# Patient Record
Sex: MALE | Race: WHITE | NOT HISPANIC OR LATINO | Employment: OTHER | URBAN - METROPOLITAN AREA
[De-identification: names, ages, dates, MRNs, and addresses within clinical notes are randomized per-mention and may not be internally consistent; named-entity substitution may affect disease eponyms.]

---

## 2017-01-01 ENCOUNTER — PRE VISIT (OUTPATIENT)
Dept: CARDIOLOGY | Facility: CLINIC | Age: 62
End: 2017-01-01

## 2017-01-01 DIAGNOSIS — I50.22 CHRONIC SYSTOLIC HEART FAILURE (H): Primary | ICD-10-CM

## 2017-01-10 ENCOUNTER — ALLIED HEALTH/NURSE VISIT (OUTPATIENT)
Dept: CARDIOLOGY | Facility: CLINIC | Age: 62
End: 2017-01-10
Attending: INTERNAL MEDICINE
Payer: MEDICARE

## 2017-01-10 ENCOUNTER — OFFICE VISIT (OUTPATIENT)
Dept: CARDIOLOGY | Facility: CLINIC | Age: 62
End: 2017-01-10
Attending: NURSE PRACTITIONER
Payer: MEDICARE

## 2017-01-10 VITALS
BODY MASS INDEX: 27.76 KG/M2 | DIASTOLIC BLOOD PRESSURE: 63 MMHG | HEART RATE: 88 BPM | WEIGHT: 176.9 LBS | HEIGHT: 67 IN | OXYGEN SATURATION: 98 % | SYSTOLIC BLOOD PRESSURE: 116 MMHG

## 2017-01-10 DIAGNOSIS — I25.5 ISCHEMIC CARDIOMYOPATHY: Primary | ICD-10-CM

## 2017-01-10 DIAGNOSIS — I50.22 CHRONIC SYSTOLIC CONGESTIVE HEART FAILURE (H): Primary | ICD-10-CM

## 2017-01-10 DIAGNOSIS — I50.22 CHRONIC SYSTOLIC HEART FAILURE (H): ICD-10-CM

## 2017-01-10 LAB
ANION GAP SERPL CALCULATED.3IONS-SCNC: 6 MMOL/L (ref 3–14)
BUN SERPL-MCNC: 6 MG/DL (ref 7–30)
CALCIUM SERPL-MCNC: 8.8 MG/DL (ref 8.5–10.1)
CHLORIDE SERPL-SCNC: 103 MMOL/L (ref 94–109)
CO2 SERPL-SCNC: 30 MMOL/L (ref 20–32)
CREAT SERPL-MCNC: 0.88 MG/DL (ref 0.66–1.25)
GFR SERPL CREATININE-BSD FRML MDRD: 88 ML/MIN/1.7M2
GLUCOSE SERPL-MCNC: 90 MG/DL (ref 70–99)
POTASSIUM SERPL-SCNC: 3.8 MMOL/L (ref 3.4–5.3)
SODIUM SERPL-SCNC: 139 MMOL/L (ref 133–144)

## 2017-01-10 PROCEDURE — 80048 BASIC METABOLIC PNL TOTAL CA: CPT | Performed by: NURSE PRACTITIONER

## 2017-01-10 PROCEDURE — 99212 OFFICE O/P EST SF 10 MIN: CPT | Mod: ZF

## 2017-01-10 PROCEDURE — 93289 INTERROG DEVICE EVAL HEART: CPT | Mod: 26 | Performed by: INTERNAL MEDICINE

## 2017-01-10 PROCEDURE — 36415 COLL VENOUS BLD VENIPUNCTURE: CPT | Performed by: NURSE PRACTITIONER

## 2017-01-10 PROCEDURE — 99214 OFFICE O/P EST MOD 30 MIN: CPT | Mod: 25 | Performed by: NURSE PRACTITIONER

## 2017-01-10 PROCEDURE — 93282 PRGRMG EVAL IMPLANTABLE DFB: CPT | Mod: ZF

## 2017-01-10 ASSESSMENT — PAIN SCALES - GENERAL: PAINLEVEL: NO PAIN (0)

## 2017-01-10 NOTE — PROGRESS NOTES
HPI: 61 yr old male patient presents to CORE clinic for initial visit. Pt is well known to me as he was a previous patient at [facility]. Pt states he feels well. He has had a low BP in the ams recently (90/60) which is not unusual for him in the winter months when he is less active. On the days his BP is low, he will cut his enalapril dose in half.  He denies chest pain, SOB, orthopnea, PND, dizziness, LE edema. He is asking about starting an ARNI, as this was brought up to him by his physician in [place]. We discussed that he would need to come off his ace inhibitor prior to starting. He had read about this drug and is concerned about the side effect of low BP given he struggles with low BP now. Given that he is class I-II we discussed it would be reasonable to hold off on any changes currently. He continues to be active. He counts his steps on his I-phone and is walking 10-15,000 steps per day.     PAST MEDICAL HISTORY:  Past Medical History   Diagnosis Date     Chronic systolic congestive heart failure (H)      Ischemic cardiomyopathy      ICD (implantable cardioverter-defibrillator) in place      HTN (hypertension)      Hyperlipidemia      Peripheral vascular disease (H)      CVA (cerebral vascular accident) (H)      Atrial fibrillation (H)        FAMILY HISTORY:  No family history on file.    SOCIAL HISTORY:  Social History     Social History     Marital Status: Single     Spouse Name: N/A     Number of Children: N/A     Years of Education: N/A     Social History Main Topics     Smoking status: Never Smoker      Smokeless tobacco: Not on file     Alcohol Use: Not on file     Drug Use: Not on file     Sexual Activity: Not on file     Other Topics Concern     Not on file     Social History Narrative       CURRENT MEDICATIONS:  Current Outpatient Prescriptions   Medication Sig Dispense Refill     Clopidogrel Bisulfate (PLAVIX PO) Take 75 mg by mouth daily       SPIRONOLACTONE PO Take 12.5 mg by mouth daily        Rosuvastatin Calcium (CRESTOR PO) Take 40 mg by mouth daily       Magic Mouthwash (FV std formula) lidocaine visc 2% 2.5mL/5mL & maalox/mylanta w/ simeth 2.5mL/5mL & diphenhydrAMINE 5mg/5mL Swish and swallow 10 mLs in mouth every 6 hours as needed for mouth sores       Enalapril Maleate (VASOTEC PO) Take 5 mg by mouth 2 times daily       Furosemide (LASIX PO) Take 20 mg by mouth       Carvedilol (COREG PO) Take 12.5 mg by mouth       Enalapril-Hydrochlorothiazide 5-12.5 MG TABS          ROS:   Constitutional: No fever, chills, or sweats. No weight gain/loss.   ENT: No visual disturbance, ear ache, epistaxis, sore throat.   Allergies/Immunologic: Negative.   Respiratory: No cough, hemoptysis.   Cardiovascular: As per HPI.   GI: No nausea, vomiting, hematemesis, melena, or hematochezia.   : No urinary frequency, dysuria, or hematuria.   Integument: Negative.   Psychiatric: Negative.   Neuro: Negative.   Endocrinology: Negative.   Musculoskeletal: Negative.    EXAM:  There were no vitals taken for this visit.  General: appears comfortable, alert and articulate  Head: normocephalic, atraumatic  Eyes: anicteric sclera, EOMI  Neck: no adenopathy  Orophyarynx: moist mucosa, no lesions, dentition intact  Heart: regular, S1/S2, no murmur, gallop, rub, estimated JVP 8cm  Lungs: clear, no rales or wheezing  Abdomen: soft, non-tender, bowel sounds present, no hepatosplenomegaly  Extremities: no clubbing, cyanosis or edema  Neurological: normal speech and affect, no gross motor deficits    Labs:  CBC RESULTS:  Lab Results   Component Value Date    WBC 7.1 07/21/2016    RBC 5.37 07/21/2016    HGB 15.0 07/21/2016    HCT 46.5 07/21/2016    MCV 87 07/21/2016    MCH 27.9 07/21/2016    MCHC 32.3 07/21/2016    RDW 14.0 07/21/2016     07/21/2016       CMP RESULTS:  Lab Results   Component Value Date     01/10/2017    POTASSIUM 3.8 01/10/2017    CHLORIDE 103 01/10/2017    CO2 30 01/10/2017    ANIONGAP 6 01/10/2017     GLC 90 01/10/2017    BUN 6* 01/10/2017    CR 0.88 01/10/2017    GFRESTIMATED 88 01/10/2017    GFRESTBLACK >90   GFR Calc   01/10/2017    JD 8.8 01/10/2017    BILITOTAL 0.6 07/21/2016    ALBUMIN 3.4 07/21/2016    ALKPHOS 59 07/21/2016    ALT 17 07/21/2016    AST 13 07/21/2016        INR RESULTS:  Lab Results   Component Value Date    INR 1.00 07/21/2016       Lab Results   Component Value Date    MAG 2.3 07/21/2016     No results found for: NTBNPI  Lab Results   Component Value Date    NTBNP 318* 07/21/2016       Assessment and Plan:   1. Chronic systolic heart failure secondary to ICM.    Stage C  NYHA Class II  ACEi/ARB yes  BB yes  Aldosterone antagonist yes  SCD prophylaxis ICD  % BiV pacing: N/A  Fluid status euvolemic  NSAID use: no    Follow-up 3 months. No medication changes made. Pt encouraged to stay active over the winter months. If his BP remains less than 90 systolic he is to call the CORE clinic.     CC  Patient Care Team:  Maykel Barber MD as PCP - General (Internal Medicine)  Jael Roblero RN as Nurse Coordinator (Cardiology)  Byron Maynard MD as MD (Cardiology)  Savana Renae, RN as Nurse Coordinator (Cardiology)  BYRON MAYNARD

## 2017-01-10 NOTE — PATIENT INSTRUCTIONS
"You were seen today in the Cardiovascular Clinic at the [facility].     Cardiology Providers you saw during your visit: Jeanie Hernandez NP       1. No medication changes.        Results for VIJAY MCKEE (MRN [id number]) as of 1/10/2017 17:12   Ref. Range 1/10/2017 15:18   Sodium Latest Ref Range: 133-144 mmol/L 139   Potassium Latest Ref Range: 3.4-5.3 mmol/L 3.8   Chloride Latest Ref Range:  mmol/L 103   Carbon Dioxide Latest Ref Range: 20-32 mmol/L 30   Urea Nitrogen Latest Ref Range: 7-30 mg/dL 6 (L)   Creatinine Latest Ref Range: 0.66-1.25 mg/dL 0.88   GFR Estimate Latest Ref Range: >60 mL/min/1.7m2 88   GFR Estimate If Black Latest Ref Range: >60 mL/min/1.7m2 >90...   Calcium Latest Ref Range: 8.5-10.1 mg/dL 8.8   Anion Gap Latest Ref Range: 3-14 mmol/L 6   Glucose Latest Ref Range: 70-99 mg/dL 90       Please limit your fluid intake to 2 L (64 ounces) daily.  2 Liters a day = 8.5 cups, or 72 ounces.  Please limit your salt intake to 2 grams a day or less.    If you gain 2# in 24 hours or 5# in one week call Savana Renae RN so we can adjust your medications as needed over the phone.    Please feel free to call me with any questions or concerns.      Savana Renae RN BSN CHFN  [facility]  Cardiology Care Coordinator-Heart Failure Clinic    Questions and [phone].   First press #1 for the [place] and then press #3 for \"Medical Questions\" to reach us Cardiology Nurses.     On Call Cardiologist for after hours or on weekends: [phone]   option #4 and ask to speak to the on-call Cardiologist. Inform them you are a CORE/heart failure patient at the [facility].        If you need a medication refill please contact your pharmacy.  Please allow 3 business days for your refill to be completed.  _______________________________________________________  C.O.R.E. CLINIC Cardiomyopathy, Optimization, Rehabilitation, Education   The C.O.R.E. CLINIC is " a heart failure specialty clinic within the [facility] where you will work with specialized nurse practioners dedicated to helping patients with heart failure carefully adjust medications, receive education, and learn who and when to call if symptoms develop. They specialize in helping you better understand your condition, slow the progression of your disease, improve the length and quality of your life, help you detect future heart problems before they become life threatening, and avoid hospitalizations.  As always, thank you for trusting us with your health care needs!

## 2017-01-10 NOTE — Clinical Note
1/10/2017      RE: Matt Hester  [address]  [place] [address]       Dear Colleague,    Thank you for the opportunity to participate in the care of your patient, Matt Hester, at the [facility] HEART [facility]. Please see a copy of my visit note below.    HPI: 61 yr old male patient presents to CORE clinic for initial visit. Pt is well known to me as he was a previous patient at [facility]. Pt states he feels well. He has had a low BP in the ams recently (90/60) which is not unusual for him in the winter months when he is less active. On the days his BP is low, he will cut his enalapril dose in half.  He denies chest pain, SOB, orthopnea, PND, dizziness, LE edema. He is asking about starting an ARNI, as this was brought up to him by his physician in [place]. We discussed that he would need to come off his ace inhibitor prior to starting. He had read about this drug and is concerned about the side effect of low BP given he struggles with low BP now. Given that he is class I-II we discussed it would be reasonable to hold off on any changes currently. He continues to be active. He counts his steps on his I-phone and is walking 10-15,000 steps per day.     PAST MEDICAL HISTORY:  Past Medical History   Diagnosis Date     Chronic systolic congestive heart failure (H)      Ischemic cardiomyopathy      ICD (implantable cardioverter-defibrillator) in place      HTN (hypertension)      Hyperlipidemia      Peripheral vascular disease (H)      CVA (cerebral vascular accident) (H)      Atrial fibrillation (H)        FAMILY HISTORY:  No family history on file.    SOCIAL HISTORY:  Social History     Social History     Marital Status: Single     Spouse Name: N/A     Number of Children: N/A     Years of Education: N/A     Social History Main Topics     Smoking status: Never Smoker      Smokeless tobacco: Not on file     Alcohol Use: Not on file     Drug Use: Not on file      Sexual Activity: Not on file     Other Topics Concern     Not on file     Social History Narrative       CURRENT MEDICATIONS:  Current Outpatient Prescriptions   Medication Sig Dispense Refill     Clopidogrel Bisulfate (PLAVIX PO) Take 75 mg by mouth daily       SPIRONOLACTONE PO Take 12.5 mg by mouth daily       Rosuvastatin Calcium (CRESTOR PO) Take 40 mg by mouth daily       Magic Mouthwash (FV std formula) lidocaine visc 2% 2.5mL/5mL & maalox/mylanta w/ simeth 2.5mL/5mL & diphenhydrAMINE 5mg/5mL Swish and swallow 10 mLs in mouth every 6 hours as needed for mouth sores       Enalapril Maleate (VASOTEC PO) Take 5 mg by mouth 2 times daily       Furosemide (LASIX PO) Take 20 mg by mouth       Carvedilol (COREG PO) Take 12.5 mg by mouth       Enalapril-Hydrochlorothiazide 5-12.5 MG TABS          ROS:   Constitutional: No fever, chills, or sweats. No weight gain/loss.   ENT: No visual disturbance, ear ache, epistaxis, sore throat.   Allergies/Immunologic: Negative.   Respiratory: No cough, hemoptysis.   Cardiovascular: As per HPI.   GI: No nausea, vomiting, hematemesis, melena, or hematochezia.   : No urinary frequency, dysuria, or hematuria.   Integument: Negative.   Psychiatric: Negative.   Neuro: Negative.   Endocrinology: Negative.   Musculoskeletal: Negative.    EXAM:  There were no vitals taken for this visit.  General: appears comfortable, alert and articulate  Head: normocephalic, atraumatic  Eyes: anicteric sclera, EOMI  Neck: no adenopathy  Orophyarynx: moist mucosa, no lesions, dentition intact  Heart: regular, S1/S2, no murmur, gallop, rub, estimated JVP 8cm  Lungs: clear, no rales or wheezing  Abdomen: soft, non-tender, bowel sounds present, no hepatosplenomegaly  Extremities: no clubbing, cyanosis or edema  Neurological: normal speech and affect, no gross motor deficits    Labs:  CBC RESULTS:  Lab Results   Component Value Date    WBC 7.1 07/21/2016    RBC 5.37 07/21/2016    HGB 15.0 07/21/2016     HCT 46.5 07/21/2016    MCV 87 07/21/2016    MCH 27.9 07/21/2016    MCHC 32.3 07/21/2016    RDW 14.0 07/21/2016     07/21/2016       CMP RESULTS:  Lab Results   Component Value Date     01/10/2017    POTASSIUM 3.8 01/10/2017    CHLORIDE 103 01/10/2017    CO2 30 01/10/2017    ANIONGAP 6 01/10/2017    GLC 90 01/10/2017    BUN 6* 01/10/2017    CR 0.88 01/10/2017    GFRESTIMATED 88 01/10/2017    GFRESTBLACK >90   GFR Calc   01/10/2017    JD 8.8 01/10/2017    BILITOTAL 0.6 07/21/2016    ALBUMIN 3.4 07/21/2016    ALKPHOS 59 07/21/2016    ALT 17 07/21/2016    AST 13 07/21/2016        INR RESULTS:  Lab Results   Component Value Date    INR 1.00 07/21/2016       Lab Results   Component Value Date    MAG 2.3 07/21/2016     No results found for: NTBNPI  Lab Results   Component Value Date    NTBNP 318* 07/21/2016       Assessment and Plan:   1. Chronic systolic heart failure secondary to ICM.    Stage C  NYHA Class II  ACEi/ARB yes  BB yes  Aldosterone antagonist yes  SCD prophylaxis ICD  % BiV pacing: N/A  Fluid status euvolemic  NSAID use: no    Follow-up 3 months. No medication changes made. Pt encouraged to stay active over the winter months. If his BP remains less than 90 systolic he is to call the CORE clinic.       Sincerely,     Jeanie Hernandez, APRN CNP      CC  Patient Care Team:  Maykel Barber MD as PCP - General (Internal Medicine)  Jael Roblero, RN as Nurse Coordinator (Cardiology)  Kimberly Maynard MD as MD (Cardiology)  Savana Renae, RN as Nurse Coordinator (Cardiology)

## 2017-01-10 NOTE — MR AVS SNAPSHOT
"              After Visit Summary   1/10/2017    Matt Mckee    MRN: [id number]           Patient Information     Date Of Birth          1955        Visit Information        Provider Department      1/10/2017 4:30 PM Jeanie Hernandez APRN [facility]        Care Instructions    You were seen today in the Cardiovascular Clinic at the [facility].     Cardiology Providers you saw during your visit: Jeanie Hernandez NP       1. No medication changes.        Results for MATT MCKEE (MRN [id number]) as of 1/10/2017 17:12   Ref. Range 1/10/2017 15:18   Sodium Latest Ref Range: 133-144 mmol/L 139   Potassium Latest Ref Range: 3.4-5.3 mmol/L 3.8   Chloride Latest Ref Range:  mmol/L 103   Carbon Dioxide Latest Ref Range: 20-32 mmol/L 30   Urea Nitrogen Latest Ref Range: 7-30 mg/dL 6 (L)   Creatinine Latest Ref Range: 0.66-1.25 mg/dL 0.88   GFR Estimate Latest Ref Range: >60 mL/min/1.7m2 88   GFR Estimate If Black Latest Ref Range: >60 mL/min/1.7m2 >90...   Calcium Latest Ref Range: 8.5-10.1 mg/dL 8.8   Anion Gap Latest Ref Range: 3-14 mmol/L 6   Glucose Latest Ref Range: 70-99 mg/dL 90       Please limit your fluid intake to 2 L (64 ounces) daily.  2 Liters a day = 8.5 cups, or 72 ounces.  Please limit your salt intake to 2 grams a day or less.    If you gain 2# in 24 hours or 5# in one week call Savana Renae RN so we can adjust your medications as needed over the phone.    Please feel free to call me with any questions or concerns.      Savana Renae RN BSN CHFN  [facility]  Cardiology Care Coordinator-Heart Failure Clinic    Questions and [phone].   First press #1 for the [place] Information and then press #3 for \"Medical Questions\" to reach us Cardiology Nurses.     On Call Cardiologist for after hours or on weekends: [phone]   option #4 and ask to speak to the on-call Cardiologist. Inform them you are a CORE/heart failure patient at " the [facility].        If you need a medication refill please contact your pharmacy.  Please allow 3 business days for your refill to be completed.  _______________________________________________________  C.O.R.E. CLINIC Cardiomyopathy, Optimization, Rehabilitation, Education   The C.O.R.E. CLINIC is a heart failure specialty clinic within the [facility] where you will work with specialized nurse practioners dedicated to helping patients with heart failure carefully adjust medications, receive education, and learn who and when to call if symptoms develop. They specialize in helping you better understand your condition, slow the progression of your disease, improve the length and quality of your life, help you detect future heart problems before they become life threatening, and avoid hospitalizations.  As always, thank you for trusting us with your health care needs!             Follow-ups after your visit        Follow-up notes from your care team     Return in about 3 months (around 4/10/2017).      Who to contact     If you have questions or need follow up information about today's clinic visit or your schedule please contact [facility] directly at [phone].  Normal or non-critical lab and imaging results will be communicated to you by MyChart, letter or phone within 4 business days after the clinic has received the results. If you do not hear from us within 7 days, please contact the clinic through [employer] or phone. If you have a critical or abnormal lab result, we will notify you by phone as soon as possible.  Submit refill requests through [employer] or call your pharmacy and they will forward the refill request to us. Please allow 3 business days for your refill to be completed.          Additional Information About Your Visit        [employer] Information     [employer] gives you secure access to your electronic health record. If you see a primary care provider, you  "can also send messages to your care team and make appointments. If you have questions, please call your primary care clinic.  If you do not have a primary care provider, please call [phone] and they will assist you.        Care EveryWhere ID     This is your Care EveryWhere ID. This could be used by other organizations to access your [place] medical records  [id number]        Your Vitals Were     Pulse Height BMI (Body Mass Index) Pulse Oximetry          88 1.702 m (5' 7\") 27.70 kg/m2 98%         Blood Pressure from Last 3 Encounters:   01/10/17 116/63   07/21/16 119/82   05/24/16 118/83    Weight from Last 3 Encounters:   01/10/17 80.241 kg (176 lb 14.4 oz)   07/21/16 75.297 kg (166 lb)              Today, you had the following     No orders found for display       Primary Care Provider Office Phone # Fax #    Maykel Barber [phone]       [place] [address] [place] [phone]        Thank you!     Thank you for choosing [facility]  for your care. Our goal is always to provide you with excellent care. Hearing back from our patients is one way we can continue to improve our services. Please take a few minutes to complete the written survey that you may receive in the mail after your visit with us. Thank you!             Your Updated Medication List - Protect others around you: Learn how to safely use, store and throw away your medicines at www.disposemymeds.org.          This list is accurate as of: 1/10/17  5:15 PM.  Always use your most recent med list.                   Brand Name Dispense Instructions for use    COREG PO      Take 12.5 mg by mouth       CRESTOR PO      Take 40 mg by mouth daily       LASIX PO      Take 20 mg by mouth       lidocaine visc 2% 2.5mL/5mL & maalox/mylanta w/ simeth 2.5mL/5mL & diphenhydrAMINE 5mg/5mL Susp suspension    MAGIC Mouthwash     Swish and swallow 10 mLs in mouth every 6 hours as needed for mouth sores       PLAVIX PO      " Take 75 mg by mouth daily       SPIRONOLACTONE PO      Take 12.5 mg by mouth daily       VASOTEC PO      Take 5 mg by mouth 2 times daily

## 2017-01-10 NOTE — NURSING NOTE
Chief Complaint   Patient presents with     New Patient     New CORE appt; 61 yr old male with h/o chronic systolic HF presenting to establish care with labs and device check prior

## 2017-01-10 NOTE — PATIENT INSTRUCTIONS
It was a pleasure seeing you in clinic today.  Please do not hesitate to call with any questions or concerns.  You are scheduled for a remote transmission on 3.  We look forward to seeing you in clinic at your next device check in 6 months.      Aziza Barreto RN  Electrophysiology Nurse Clinician  [facility]  Heart [facility]    During Business Hours Please Call:  [phone]  After Hours Please Call:  [phone]- select option #4 and ask for job code [id number]

## 2017-01-10 NOTE — PROGRESS NOTES
Patient seen in clinic for evaluation and iterative programming of a [employer] Single lead ICD per MD orders. Patient has an appointment to see NEEMA Sargent CNP today. Normal ICD function.  0 episodes recorded. Intrinsic rhythm = SR @ 70 bpm.  = 0%. Estimated battery longevity = 7 years to CHARISSA. RV lead impedance trends appear stable. Patient reports that he is feeling well and denies complaints. Plan for patient to send a remote transmission in 3 months and return to clinic in 6 months.

## 2017-01-11 NOTE — NURSING NOTE
Diet: Patient instructed regarding a heart failure healthy diet, including discussion of reduced fat and 2000 mg daily sodium restriction, daily weights, medication purpose and compliance, fluid restrictions and resources for patient and family to access for assistance with heart failure management.       Labs: Patient was given results of the laboratory testing obtained today and patient was instructed about when to return for the next laboratory testing.    Med Reconcile: Reviewed and verified all current medications with the patient. The updated medication list was printed and given to the patient.    Return Appointment: Patient given instructions regarding scheduling next clinic visit.     Patient stated he understood all health information given and agreed to call with further questions or concerns.

## 2017-04-18 ENCOUNTER — PRE VISIT (OUTPATIENT)
Dept: CARDIOLOGY | Facility: CLINIC | Age: 62
End: 2017-04-18

## 2017-04-18 DIAGNOSIS — I50.22 CHRONIC SYSTOLIC HEART FAILURE (H): Primary | ICD-10-CM

## 2017-04-25 ENCOUNTER — ALLIED HEALTH/NURSE VISIT (OUTPATIENT)
Dept: CARDIOLOGY | Facility: CLINIC | Age: 62
End: 2017-04-25
Attending: NURSE PRACTITIONER
Payer: MEDICARE

## 2017-04-25 VITALS
SYSTOLIC BLOOD PRESSURE: 123 MMHG | HEART RATE: 86 BPM | DIASTOLIC BLOOD PRESSURE: 84 MMHG | WEIGHT: 169 LBS | OXYGEN SATURATION: 95 % | BODY MASS INDEX: 26.47 KG/M2

## 2017-04-25 DIAGNOSIS — I25.5 ISCHEMIC CARDIOMYOPATHY: Primary | ICD-10-CM

## 2017-04-25 DIAGNOSIS — I50.22 CHRONIC SYSTOLIC HEART FAILURE (H): ICD-10-CM

## 2017-04-25 LAB
ANION GAP SERPL CALCULATED.3IONS-SCNC: 4 MMOL/L (ref 3–14)
BUN SERPL-MCNC: 9 MG/DL (ref 7–30)
CALCIUM SERPL-MCNC: 8.7 MG/DL (ref 8.5–10.1)
CHLORIDE SERPL-SCNC: 106 MMOL/L (ref 94–109)
CO2 SERPL-SCNC: 30 MMOL/L (ref 20–32)
CREAT SERPL-MCNC: 0.88 MG/DL (ref 0.66–1.25)
GFR SERPL CREATININE-BSD FRML MDRD: 88 ML/MIN/1.7M2
GLUCOSE SERPL-MCNC: 83 MG/DL (ref 70–99)
POTASSIUM SERPL-SCNC: 3.4 MMOL/L (ref 3.4–5.3)
SODIUM SERPL-SCNC: 140 MMOL/L (ref 133–144)

## 2017-04-25 PROCEDURE — 36415 COLL VENOUS BLD VENIPUNCTURE: CPT | Performed by: NURSE PRACTITIONER

## 2017-04-25 PROCEDURE — 93282 PRGRMG EVAL IMPLANTABLE DFB: CPT | Mod: ZF

## 2017-04-25 PROCEDURE — 99212 OFFICE O/P EST SF 10 MIN: CPT | Mod: ZF

## 2017-04-25 PROCEDURE — 93282 PRGRMG EVAL IMPLANTABLE DFB: CPT | Mod: 26 | Performed by: INTERNAL MEDICINE

## 2017-04-25 PROCEDURE — 80048 BASIC METABOLIC PNL TOTAL CA: CPT | Performed by: NURSE PRACTITIONER

## 2017-04-25 PROCEDURE — 99214 OFFICE O/P EST MOD 30 MIN: CPT | Mod: 25 | Performed by: NURSE PRACTITIONER

## 2017-04-25 RX ORDER — ENALAPRIL MALEATE 5 MG/1
5 TABLET ORAL 2 TIMES DAILY
Qty: 30 TABLET | Refills: 1 | Status: SHIPPED | OUTPATIENT
Start: 2017-04-25 | End: 2021-11-17

## 2017-04-25 ASSESSMENT — PAIN SCALES - GENERAL: PAINLEVEL: NO PAIN (0)

## 2017-04-25 NOTE — MR AVS SNAPSHOT
"              After Visit Summary   2017    Matt Mckee    MRN: [id number]           Patient Information     Date Of Birth          1955        Visit Information        Provider Department      2017 6:30 PM Jeanie Hernandez APRN [facility]        Today's Diagnoses     Ischemic cardiomyopathy    -  1      Care Instructions    You were seen today in the Cardiovascular Clinic at the [facility].     Cardiology Providers you saw during your visit: Jeanie Hernandez NP       1. No medication changes for today.    Results for MATT MCKEE (MRN [id number]) as of 2017 17:35   Ref. Range 2017 15:53   Sodium Latest Ref Range: 133 - 144 mmol/L 140   Potassium Latest Ref Range: 3.4 - 5.3 mmol/L 3.4   Chloride Latest Ref Range: 94 - 109 mmol/L 106   Carbon Dioxide Latest Ref Range: 20 - 32 mmol/L 30   Urea Nitrogen Latest Ref Range: 7 - 30 mg/dL 9   Creatinine Latest Ref Range: 0.66 - 1.25 mg/dL 0.88   GFR Estimate Latest Ref Range: >60 mL/min/1.7m2 88   GFR Estimate If Black Latest Ref Range: >60 mL/min/1.7m2 >90...   Calcium Latest Ref Range: 8.5 - 10.1 mg/dL 8.7   Anion Gap Latest Ref Range: 3 - 14 mmol/L 4   Glucose Latest Ref Range: 70 - 99 mg/dL 83       Please limit your fluid intake to 2 L (64 ounces) daily.  2 Liters a day = 8.5 cups, or 72 ounces.  Please limit your salt intake to 2 grams a day or less.    If you gain 2# in 24 hours or 5# in one week call Savana Renae RN so we can adjust your medications as needed over the phone.    Please feel free to call me with any questions or concerns.      Savana Renae RN BSN CHFN  [facility]  Cardiology Care Coordinator-Heart Failure Clinic    Questions and [phone].   First press #1 for the University and then press #3 for \"Medical Questions\" to reach us Cardiology Nurses.     On Call Cardiologist for after hours or on weekends: [phone]   option #4 and ask to speak to " the on-call Cardiologist. Inform them you are a CORE/heart failure patient at the [facility].        If you need a medication refill please contact your pharmacy.  Please allow 3 business days for your refill to be completed.  _______________________________________________________  C.O.R.E. CLINIC Cardiomyopathy, Optimization, Rehabilitation, Education   The C.O.R.E. CLINIC is a heart failure specialty clinic within the [facility] where you will work with specialized nurse practitioners dedicated to helping patients with heart failure carefully adjust medications, receive education, and learn who and when to call if symptoms develop. They specialize in helping you better understand your condition, slow the progression of your disease, improve the length and quality of your life, help you detect future heart problems before they become life threatening, and avoid hospitalizations.  As always, thank you for trusting us with your health care needs!             Follow-ups after your visit        Your next 10 appointments already scheduled     Jul 20, 2017  8:00 AM CDT   (Arrive by 7:45 AM)   RETURN HEART FAILURE with Kimberly Maynard MD   [facility] ([facility])    [address] [place] [id number] [phone]            Oct 03, 2017  3:00 PM CDT   Lab with UC LAB   [facility] ([facility])    [address] [place] [id number] [phone]            Oct 03, 2017  3:30 PM CDT   (Arrive by 3:15 PM)   CORE RETURN with NEEMA Turk CNP   [facility] ([facility])    [address] [place] [id number] [phone]              Who to contact     If you have questions or need follow up information about today's clinic visit or your schedule please contact [facility] directly at  [phone].  Normal or non-critical lab and imaging results will be communicated to you by Pixelatedhart, letter or phone within 4 business days after the clinic has received the results. If you do not hear from us within 7 days, please contact the clinic through Resale Therapyt or phone. If you have a critical or abnormal lab result, we will notify you by phone as soon as possible.  Submit refill requests through CultureIQ or call your pharmacy and they will forward the refill request to us. Please allow 3 business days for your refill to be completed.          Additional Information About Your Visit        Pixelatedhart Information     CultureIQ gives you secure access to your electronic health record. If you see a primary care provider, you can also send messages to your care team and make appointments. If you have questions, please call your primary care clinic.  If you do not have a primary care provider, please call [phone] and they will assist you.        Care EveryWhere ID     This is your Care EveryWhere ID. This could be used by other organizations to access your [place] medical records  [id number]        Your Vitals Were     Pulse Pulse Oximetry BMI (Body Mass Index)             86 95% 26.47 kg/m2          Blood Pressure from Last 3 Encounters:   04/25/17 123/84   01/10/17 116/63   07/21/16 119/82    Weight from Last 3 Encounters:   04/25/17 76.7 kg (169 lb)   01/10/17 80.2 kg (176 lb 14.4 oz)   07/21/16 75.3 kg (166 lb)              Today, you had the following     No orders found for display         Today's Medication Changes          These changes are accurate as of: 4/25/17  8:07 PM.  If you have any questions, ask your nurse or doctor.               These medicines have changed or have updated prescriptions.        Dose/Directions    enalapril 5 MG tablet   Commonly known as:  VASOTEC   This may have changed:  medication strength   Used for:  Ischemic cardiomyopathy   Changed by:  Jeanie Hernandez APRN CNP         Dose:  5 mg   Take 1 tablet (5 mg) by mouth 2 times daily   Quantity:  30 tablet   Refills:  1            Where to get your medicines      These medications were sent to [facility] [address] [facility] - [place], [place] - [address]  [address] [facility] [address]     Phone:  [phone]     enalapril 5 MG tablet                Primary Care Provider Office Phone # Fax #    Maykel Barber [phone]       [facility] [address] [facility] [address]        Thank you!     Thank you for choosing [facility]  for your care. Our goal is always to provide you with excellent care. Hearing back from our patients is one way we can continue to improve our services. Please take a few minutes to complete the written survey that you may receive in the mail after your visit with us. Thank you!             Your Updated Medication List - Protect others around you: Learn how to safely use, store and throw away your medicines at www.disposemymeds.org.          This list is accurate as of: 4/25/17  8:07 PM.  Always use your most recent med list.                   Brand Name Dispense Instructions for use    COREG PO      Take 12.5 mg by mouth       CRESTOR PO      Take 40 mg by mouth daily       enalapril 5 MG tablet    VASOTEC    30 tablet    Take 1 tablet (5 mg) by mouth 2 times daily       LASIX PO      Take 20 mg by mouth       lidocaine visc 2% 2.5mL/5mL & maalox/mylanta w/ simeth 2.5mL/5mL & diphenhydrAMINE 5mg/5mL Susp suspension    [facility] Mouthwash [facility]     Swish and swallow 10 mLs in mouth every 6 hours as needed for mouth sores       PLAVIX PO      Take 75 mg by mouth daily       SPIRONOLACTONE PO      Take 12.5 mg by mouth daily

## 2017-04-25 NOTE — MR AVS SNAPSHOT
After Visit Summary   4/25/2017    Matt Hester    MRN: [id number]           Patient Information     Date Of Birth          1955        Visit Information        Provider Department      4/25/2017 5:30 PM 1, Uc Cv Device [facility]        Today's Diagnoses     Ischemic cardiomyopathy    -  1       Follow-ups after your visit        Follow-up notes from your care team     Return in about 4 months (around 8/25/2017) for [place], ICD check.      Your next 10 appointments already scheduled     Apr 25, 2017  5:30 PM CDT   (Arrive by 5:15 PM)   Implanted Defibulator with Uc Cv Device 1   [facility]    [address] [place] [id number] [phone]            Apr 25, 2017  6:00 PM CDT   Lab with  LAB   [facility])    [address] [place] [id number] [phone]            Apr 25, 2017  6:30 PM CDT   (Arrive by 6:15 PM)   CORE RETURN with NEEMA Turk CNP   [facility]    [address] [place] [id number] [phone]              Who to contact     If you have questions or need follow up information about today's clinic visit or your schedule please contact [facility] directly at [phone].  Normal or non-critical lab and imaging results will be communicated to you by MyChart, letter or phone within 4 business days after the clinic has received the results. If you do not hear from us within 7 days, please contact the clinic through MyChart or phone. If you have a critical or abnormal lab result, we will notify you by phone as soon as possible.  Submit refill requests through Graffle or call your pharmacy and they will forward the refill request to us. Please allow 3 business days for your refill to be completed.          Additional Information About Your  Visit        Kiliman[employer]o Energy[employer] Information     CISSOID gives you secure access to your electronic health record. If you see a primary care provider, you can also send messages to your care team and make appointments. If you have questions, please call your primary care clinic.  If you do not have a primary care provider, please call [phone] and they will assist you.        Care EveryWhere ID     This is your Care EveryWhere ID. This could be used by other organizations to access your [place] medical records  [id number]         Blood Pressure from Last 3 Encounters:   01/10/17 116/63   07/21/16 119/82   05/24/16 118/83    Weight from Last 3 Encounters:   01/10/17 80.2 kg (176 lb 14.4 oz)   07/21/16 75.3 kg (166 lb)              We Performed the Following     ICD DEVICE PROGRAMMING EVAL, SINGLE LEAD ICD        Primary Care Provider Office Phone # Fax #    Maykel Barber [phone]       [place] [address] [address] [place] [phone]        Thank you!     Thank you for choosing [facility]  for your care. Our goal is always to provide you with excellent care. Hearing back from our patients is one way we can continue to improve our services. Please take a few minutes to complete the written survey that you may receive in the mail after your visit with us. Thank you!             Your Updated Medication List - Protect others around you: Learn how to safely use, store and throw away your medicines at www.disposemymeds.org.          This list is accurate as of: 4/25/17  4:18 PM.  Always use your most recent med list.                   Brand Name Dispense Instructions for use    COREG PO      Take 12.5 mg by mouth       CRESTOR PO      Take 40 mg by mouth daily       LASIX PO      Take 20 mg by mouth       lidocaine visc 2% 2.5mL/5mL & maalox/mylanta w/ simeth 2.5mL/5mL & diphenhydrAMINE 5mg/5mL Susp suspension    [facility]     Swish and swallow 10 mLs in mouth every 6 hours as  needed for mouth sores       PLAVIX PO      Take 75 mg by mouth daily       SPIRONOLACTONE PO      Take 12.5 mg by mouth daily       VASOTEC PO      Take 5 mg by mouth 2 times daily

## 2017-04-25 NOTE — NURSING NOTE
Chief Complaint   Patient presents with     Follow Up For     Return CORE appt; 62 yr old male with h/o chronic systolic HF presenting for follow up with labs prior     BRIANA Panda

## 2017-04-25 NOTE — PATIENT INSTRUCTIONS
"[facility]     Cardiology Providers you saw during your visit: Jeanie Hernandez NP       1. No medication changes for today.    Results for VIJAY MCKEE (MRN [id number]) as of 2017 17:35   Ref. Range 2017 15:53   Sodium Latest Ref Range: 133 - 144 mmol/L 140   Potassium Latest Ref Range: 3.4 - 5.3 mmol/L 3.4   Chloride Latest Ref Range: 94 - 109 mmol/L 106   Carbon Dioxide Latest Ref Range: 20 - 32 mmol/L 30   Urea Nitrogen Latest Ref Range: 7 - 30 mg/dL 9   Creatinine Latest Ref Range: 0.66 - 1.25 mg/dL 0.88   GFR Estimate Latest Ref Range: >60 mL/min/1.7m2 88   GFR Estimate If Black Latest Ref Range: >60 mL/min/1.7m2 >90...   Calcium Latest Ref Range: 8.5 - 10.1 mg/dL 8.7   Anion Gap Latest Ref Range: 3 - 14 mmol/L 4   Glucose Latest Ref Range: 70 - 99 mg/dL 83       Please limit your fluid intake to 2 L (64 ounces) daily.  2 Liters a day = 8.5 cups, or 72 ounces.  Please limit your salt intake to 2 grams a day or less.    If you gain 2# in 24 hours or 5# in one week call Savana Renae RN so we can adjust your medications as needed over the phone.    Please feel free to call me with any questions or concerns.      Savana Renae RN BSN CHFN  [facility]  Cardiology Care Coordinator-Heart Failure Clinic    Questions and [phone].   First press #1 for the [employer] and then press #3 for \"Medical Questions\" to reach us Cardiology Nurses.     On Call Cardiologist for after hours or on weekends: [phone]   option #4 and ask to speak to the on-call Cardiologist. Inform them you are a CORE/heart failure patient at the [facility].        If you need a medication refill please contact your pharmacy.  Please allow 3 business days for your refill to be completed.  _______________________________________________________  C.O.R.E. CLINIC Cardiomyopathy, Optimization, Rehabilitation, Education   The " [facility]. [facility] is a heart failure specialty clinic within the [facility] where you will work with specialized nurse practitioners dedicated to helping patients with heart failure carefully adjust medications, receive education, and learn who and when to call if symptoms develop. They specialize in helping you better understand your condition, slow the progression of your disease, improve the length and quality of your life, help you detect future heart problems before they become life threatening, and avoid hospitalizations.  As always, thank you for trusting us with your health care needs!

## 2017-04-25 NOTE — PROGRESS NOTES
HPI: 62 yr old male pt returns to CORE clinic for follow up of ischemic cardiomyopathy. Pt states he feels well. Over the winter he is less active, and as a result he has a lower BP. He feels lightheaded and has to cut back on his meds due to such. He states this happens every winter. He otherwise denies SOB, orthopnea, PND, chest pain, LE edema. His energy level is good, he is working 60+ hours/week.  He is not using lasix.     PAST MEDICAL HISTORY:  Past Medical History:   Diagnosis Date     Atrial fibrillation (H)      Chronic systolic congestive heart failure (H)      CVA (cerebral vascular accident) (H)      HTN (hypertension)      Hyperlipidemia      ICD (implantable cardioverter-defibrillator) in place      Ischemic cardiomyopathy      Peripheral vascular disease (H)        FAMILY HISTORY:  No family history on file.    SOCIAL HISTORY:  Social History     Social History     Marital status: Single     Spouse name: N/A     Number of children: N/A     Years of education: N/A     Social History Main Topics     Smoking status: Never Smoker     Smokeless tobacco: None     Alcohol use None     Drug use: None     Sexual activity: Not Asked     Other Topics Concern     None     Social History Narrative       CURRENT MEDICATIONS:  Current Outpatient Prescriptions   Medication Sig Dispense Refill     enalapril (VASOTEC) 5 MG tablet Take 1 tablet (5 mg) by mouth 2 times daily 30 tablet 1     Clopidogrel Bisulfate (PLAVIX PO) Take 75 mg by mouth daily       SPIRONOLACTONE PO Take 12.5 mg by mouth daily       Rosuvastatin Calcium (CRESTOR PO) Take 40 mg by mouth daily       Magic Mouthwash (FV std formula) lidocaine visc 2% 2.5mL/5mL & maalox/mylanta w/ simeth 2.5mL/5mL & diphenhydrAMINE 5mg/5mL Swish and swallow 10 mLs in mouth every 6 hours as needed for mouth sores       Furosemide (LASIX PO) Take 20 mg by mouth       Carvedilol (COREG PO) Take 12.5 mg by mouth       [DISCONTINUED] Enalapril Maleate (VASOTEC PO) Take 5  mg by mouth 2 times daily         ROS:   Constitutional: No fever, chills, or sweats. No weight gain/loss.   ENT: No visual disturbance, ear ache, epistaxis, sore throat.   Allergies/Immunologic: Negative.   Respiratory: No cough, hemoptysis.   Cardiovascular: As per HPI.   GI: No nausea, vomiting, hematemesis, melena, or hematochezia.   : No urinary frequency, dysuria, or hematuria.   Integument: Negative.   Psychiatric: Negative.   Neuro: Negative.   Endocrinology: Negative.   Musculoskeletal: Negative.    EXAM:  /84 (BP Location: Left arm, Patient Position: Chair, Cuff Size: Adult Regular)  Pulse 86  Wt 76.7 kg (169 lb)  SpO2 95%  BMI 26.47 kg/m2  General: appears comfortable, alert and articulate  Head: normocephalic, atraumatic  Eyes: anicteric sclera, EOMI  Neck: no adenopathy  Orophyarynx: moist mucosa, no lesions, dentition intact  Heart: regular, S1/S2, no murmur, gallop, rub, estimated JVP 8cm  Lungs: clear, no rales or wheezing  Abdomen: soft, non-tender, bowel sounds present, no hepatosplenomegaly  Extremities: no clubbing, cyanosis or edema  Neurological: normal speech and affect, no gross motor deficits    Labs:  CBC RESULTS:  Lab Results   Component Value Date    WBC 7.1 07/21/2016    RBC 5.37 07/21/2016    HGB 15.0 07/21/2016    HCT 46.5 07/21/2016    MCV 87 07/21/2016    MCH 27.9 07/21/2016    MCHC 32.3 07/21/2016    RDW 14.0 07/21/2016     07/21/2016       CMP RESULTS:  Lab Results   Component Value Date     04/25/2017    POTASSIUM 3.4 04/25/2017    CHLORIDE 106 04/25/2017    CO2 30 04/25/2017    ANIONGAP 4 04/25/2017    GLC 83 04/25/2017    BUN 9 04/25/2017    CR 0.88 04/25/2017    GFRESTIMATED 88 04/25/2017    GFRESTBLACK >90   GFR Calc   04/25/2017    JD 8.7 04/25/2017    BILITOTAL 0.6 07/21/2016    ALBUMIN 3.4 07/21/2016    ALKPHOS 59 07/21/2016    ALT 17 07/21/2016    AST 13 07/21/2016        INR RESULTS:  Lab Results   Component Value Date    INR 1.00  07/21/2016       Lab Results   Component Value Date    MAG 2.3 07/21/2016     No results found for: NTBNPI  Lab Results   Component Value Date    NTBNP 318 (H) 07/21/2016       Assessment and Plan:   1. Chronic systolic heart failure secondary to ICM.    Stage C  NYHA Class II  ACEi/ARB yes  BB yes  Aldosterone antagonist yes  SCD prophylaxis ICD  % BiV pacing: N/A  Fluid status euvolemic  NSAID use: no    2. ASCAD: no anginal sx: on BB/ASA/Statin    3. Hx of CVA - on Plavix - intolerant of Aggrenox    4. Tachycardia: discussed use of ivabradine. Pt would like to think about this prior to starting. He has an upcoming apt in July with Dr. Maynard- October with [facility]    Follow-up July with Dr. Maynard    CC  Patient Care Team:  Maykel Barber MD as PCP - General (Internal Medicine)  Jael Roblero RN as Nurse Coordinator (Cardiology)  Byron Maynard MD as MD (Cardiology)  Savana Renae, RN as Nurse Coordinator (Cardiology)  Jeanie Hernandez APRN CNP as Nurse Practitioner (Nurse Practitioner - Gerontology)  BYRON MAYNARD

## 2017-04-25 NOTE — LETTER
4/25/2017      RE: Matt Hester  [address]  [facility] [address]       Dear Colleague,    Thank you for the opportunity to participate in the care of your patient, Matt Hester, at the [facility] HE[facility]. Please see a copy of my visit note below.    HPI: 62 yr old male pt returns to [facility] clinic for follow up of ischemic cardiomyopathy. Pt states he feels well. Over the winter he is less active, and as a result he has a lower BP. He feels lightheaded and has to cut back on his meds due to such. He states this happens every winter. He otherwise denies SOB, orthopnea, PND, chest pain, LE edema. His energy level is good, he is working 60+ hours/week.  He is not using lasix.     PAST MEDICAL HISTORY:  Past Medical History:   Diagnosis Date     Atrial fibrillation (H)      Chronic systolic congestive heart failure (H)      CVA (cerebral vascular accident) (H)      HTN (hypertension)      Hyperlipidemia      ICD (implantable cardioverter-defibrillator) in place      Ischemic cardiomyopathy      Peripheral vascular disease (H)        FAMILY HISTORY:  No family history on file.    SOCIAL HISTORY:  Social History     Social History     Marital status: Single     Spouse name: N/A     Number of children: N/A     Years of education: N/A     Social History Main Topics     Smoking status: Never Smoker     Smokeless tobacco: None     Alcohol use None     Drug use: None     Sexual activity: Not Asked     Other Topics Concern     None     Social History Narrative       CURRENT MEDICATIONS:  Current Outpatient Prescriptions   Medication Sig Dispense Refill     enalapril (VASOTEC) 5 MG tablet Take 1 tablet (5 mg) by mouth 2 times daily 30 tablet 1     Clopidogrel Bisulfate (PLAVIX PO) Take 75 mg by mouth daily       SPIRONOLACTONE PO Take 12.5 mg by mouth daily       Rosuvastatin Calcium (CRESTOR PO) Take 40 mg by mouth daily       Magic Mouthwash (FV std formula)  lidocaine visc 2% 2.5mL/5mL & maalox/mylanta w/ simeth 2.5mL/5mL & diphenhydrAMINE 5mg/5mL Swish and swallow 10 mLs in mouth every 6 hours as needed for mouth sores       Furosemide (LASIX PO) Take 20 mg by mouth       Carvedilol (COREG PO) Take 12.5 mg by mouth       [DISCONTINUED] Enalapril Maleate (VASOTEC PO) Take 5 mg by mouth 2 times daily         ROS:   Constitutional: No fever, chills, or sweats. No weight gain/loss.   ENT: No visual disturbance, ear ache, epistaxis, sore throat.   Allergies/Immunologic: Negative.   Respiratory: No cough, hemoptysis.   Cardiovascular: As per HPI.   GI: No nausea, vomiting, hematemesis, melena, or hematochezia.   : No urinary frequency, dysuria, or hematuria.   Integument: Negative.   Psychiatric: Negative.   Neuro: Negative.   Endocrinology: Negative.   Musculoskeletal: Negative.    EXAM:  /84 (BP Location: Left arm, Patient Position: Chair, Cuff Size: Adult Regular)  Pulse 86  Wt 76.7 kg (169 lb)  SpO2 95%  BMI 26.47 kg/m2  General: appears comfortable, alert and articulate  Head: normocephalic, atraumatic  Eyes: anicteric sclera, EOMI  Neck: no adenopathy  Orophyarynx: moist mucosa, no lesions, dentition intact  Heart: regular, S1/S2, no murmur, gallop, rub, estimated JVP 8cm  Lungs: clear, no rales or wheezing  Abdomen: soft, non-tender, bowel sounds present, no hepatosplenomegaly  Extremities: no clubbing, cyanosis or edema  Neurological: normal speech and affect, no gross motor deficits    Labs:  CBC RESULTS:  Lab Results   Component Value Date    WBC 7.1 07/21/2016    RBC 5.37 07/21/2016    HGB 15.0 07/21/2016    HCT 46.5 07/21/2016    MCV 87 07/21/2016    MCH 27.9 07/21/2016    MCHC 32.3 07/21/2016    RDW 14.0 07/21/2016     07/21/2016       CMP RESULTS:  Lab Results   Component Value Date     04/25/2017    POTASSIUM 3.4 04/25/2017    CHLORIDE 106 04/25/2017    CO2 30 04/25/2017    ANIONGAP 4 04/25/2017    GLC 83 04/25/2017    BUN 9 04/25/2017     CR 0.88 04/25/2017    GFRESTIMATED 88 04/25/2017    GFRESTBLACK >90   GFR Calc   04/25/2017    JD 8.7 04/25/2017    BILITOTAL 0.6 07/21/2016    ALBUMIN 3.4 07/21/2016    ALKPHOS 59 07/21/2016    ALT 17 07/21/2016    AST 13 07/21/2016        INR RESULTS:  Lab Results   Component Value Date    INR 1.00 07/21/2016       Lab Results   Component Value Date    MAG 2.3 07/21/2016     No results found for: NTBNPI  Lab Results   Component Value Date    NTBNP 318 (H) 07/21/2016       Assessment and Plan:   1. Chronic systolic heart failure secondary to ICM.    Stage C  NYHA Class II  ACEi/ARB yes  BB yes  Aldosterone antagonist yes  SCD prophylaxis ICD  % BiV pacing: N/A  Fluid status euvolemic  NSAID use: no    2. ASCAD: no anginal sx: on BB/ASA/Statin    3. Hx of CVA - on Plavix - intolerant of Aggrenox    4. Tachycardia: discussed use of ivabradine. Pt would like to think about this prior to starting. He has an upcoming apt in July with Dr. Maynard- October with [facility]    Follow-up July with Dr. Maynard    CC  Patient Care Team:  Maykel Barber MD as PCP - General (Internal Medicine)  Jael Roblero, RN as Nurse Coordinator (Cardiology)  Byron Maynard MD as MD (Cardiology)  Savana Renae, RN as Nurse Coordinator (Cardiology)  Jeanie Hernandez APRN CNP as Nurse Practitioner (Nurse Practitioner - Gerontology)  BYRON MAYNARD      Please do not hesitate to contact me if you have any questions/concerns.     Sincerely,     NEEMA Turk CNP

## 2017-05-16 ENCOUNTER — TRANSFERRED RECORDS (OUTPATIENT)
Dept: HEALTH INFORMATION MANAGEMENT | Facility: CLINIC | Age: 62
End: 2017-05-16

## 2017-07-21 ENCOUNTER — PRE VISIT (OUTPATIENT)
Dept: CARDIOLOGY | Facility: CLINIC | Age: 62
End: 2017-07-21

## 2017-07-25 ENCOUNTER — ALLIED HEALTH/NURSE VISIT (OUTPATIENT)
Dept: CARDIOLOGY | Facility: CLINIC | Age: 62
End: 2017-07-25
Attending: INTERNAL MEDICINE
Payer: MEDICARE

## 2017-07-25 VITALS
HEART RATE: 77 BPM | SYSTOLIC BLOOD PRESSURE: 124 MMHG | OXYGEN SATURATION: 95 % | HEIGHT: 67 IN | WEIGHT: 171.6 LBS | BODY MASS INDEX: 26.93 KG/M2 | DIASTOLIC BLOOD PRESSURE: 80 MMHG

## 2017-07-25 DIAGNOSIS — I50.22 CHRONIC SYSTOLIC HEART FAILURE (H): Primary | ICD-10-CM

## 2017-07-25 DIAGNOSIS — I25.5 ISCHEMIC CARDIOMYOPATHY: Primary | ICD-10-CM

## 2017-07-25 PROCEDURE — 99214 OFFICE O/P EST MOD 30 MIN: CPT | Mod: 25 | Performed by: INTERNAL MEDICINE

## 2017-07-25 PROCEDURE — 93282 PRGRMG EVAL IMPLANTABLE DFB: CPT | Mod: ZF

## 2017-07-25 PROCEDURE — 99212 OFFICE O/P EST SF 10 MIN: CPT

## 2017-07-25 PROCEDURE — 93289 INTERROG DEVICE EVAL HEART: CPT | Mod: 26 | Performed by: INTERNAL MEDICINE

## 2017-07-25 ASSESSMENT — PAIN SCALES - GENERAL: PAINLEVEL: NO PAIN (0)

## 2017-07-25 NOTE — MR AVS SNAPSHOT
After Visit Summary   7/25/2017    Matt Hester    MRN: [id number]           Patient Information     Date Of Birth          1955        Visit Information        Provider Department      7/25/2017 9:30 AM Kimberly Maynard MD [facility]        Today's Diagnoses     Chronic systolic heart failure (H)    -  1       Follow-ups after your visit        Your next 10 appointments already scheduled     Oct 03, 2017  2:30 PM CDT   Lab with  LAB   [facility] ([facility])    [address] [place] [id number]   [phone]            Oct 03, 2017  3:00 PM CDT   (Arrive by 2:45 PM)   Implanted Defibulator with  Cv Device 1   [facility] ([facility])    [address] [place] [id number] [phone]            Oct 03, 2017  3:30 PM CDT   (Arrive by 3:15 PM)   [facility] RETURN with NEEMA Turk CNP   [facility] ([facility])    [address] [place] [id number] [phone]              Who to contact     If you have questions or need follow up information about today's clinic visit or your schedule please contact [facility] directly at [phone].  Normal or non-critical lab and imaging results will be communicated to you by MyChart, letter or phone within 4 business days after the clinic has received the results. If you do not hear from us within 7 days, please contact the clinic through MyChart or phone. If you have a critical or abnormal lab result, we will notify you by phone as soon as possible.  Submit refill requests through [employer] or call your pharmacy and they will forward the refill request to us. Please allow 3 business days for your refill to be completed.          Additional Information About Your Visit        [employer] Information     [employer] gives you secure access to your electronic health  "record. If you see a primary care provider, you can also send messages to your care team and make appointments. If you have questions, please call your primary care clinic.  If you do not have a primary care provider, please call [phone] and they will assist you.        Care EveryWhere ID     This is your Care EveryWhere ID. This could be used by other organizations to access your [place] medical records  [id number]        Your Vitals Were     Pulse Height Pulse Oximetry BMI (Body Mass Index)          77 1.702 m (5' 7\") 95% 26.88 kg/m2         Blood Pressure from Last 3 Encounters:   07/25/17 124/80   04/25/17 123/84   01/10/17 116/63    Weight from Last 3 Encounters:   07/25/17 77.8 kg (171 lb 9.6 oz)   04/25/17 76.7 kg (169 lb)   01/10/17 80.2 kg (176 lb 14.4 oz)              Today, you had the following     No orders found for display       Primary Care Provider Office Phone # Fax #    Maykel Barber [phone]       [facility] [address] [facility] [phone]        Equal Access to Services     MARILYN ALICEA AH: Hadii aad martha hadasho Sogueraali, waaxda luqadaha, qaybta kaalmada adeegyada, jeanie barragan. So [facility] [phone].    ATENCIÓN: Si habla español, tiene a castaneda disposición servicios gratuitos de asistencia lingüística. Ll[facility] [phone].    We comply with applicable federal civil rights laws and [place] laws. We do not discriminate on the basis of race, color, national origin, age, disability sex, sexual orientation or gender identity.            Thank you!     Thank you for choosing [facility]  for your care. Our goal is always to provide you with excellent care. Hearing back from our patients is one way we can continue to improve our services. Please take a few minutes to complete the written survey that you may receive in the mail after your visit with us. Thank you!             Your Updated Medication List - Protect others around " you: Learn how to safely use, store and throw away your medicines at www.disposemymeds.org.          This list is accurate as of: 7/25/17 12:58 PM.  Always use your most recent med list.                   Brand Name Dispense Instructions for use Diagnosis    COREG PO      Take 12.5 mg by mouth        CRESTOR PO      Take 40 mg by mouth daily        enalapril 5 MG tablet    VASOTEC    30 tablet    Take 1 tablet (5 mg) by mouth 2 times daily    Ischemic cardiomyopathy       LASIX PO      Take 20 mg by mouth        lidocaine visc 2% 2.5mL/5mL & maalox/mylanta w/ simeth 2.5mL/5mL & diphenhydrAMINE 5mg/5mL Susp suspension    [facility] Mouthwash [facility]     Swish and swallow 10 mLs in mouth every 6 hours as needed for mouth sores        PLAVIX PO      Take 75 mg by mouth daily        SPIRONOLACTONE PO      Take 12.5 mg by mouth daily

## 2017-07-25 NOTE — NURSING NOTE
Chief Complaint   Patient presents with     Follow Up For     one yr. follow up for ICM/ device ck

## 2017-07-25 NOTE — PATIENT INSTRUCTIONS
It was a pleasure to see you in clinic today.  Please do not hesitate to call us if you have any questions or concerns.  Please return to clinic in 3 mos for a ICD check.      Kerrie De Dios R.N.  Electrophysiology nurse specialist  [facility]  [address] [facility] [address]     Jing Brody  [phone] business hours    After business hours please call [phone], option #4, and ask for Job code [phone].

## 2017-07-25 NOTE — PROGRESS NOTES
Pt seen in clinic for evaluation and iterative programming of his [employer] single chamber ICD per MD order.  He looks well and he states that he is feeling well and he continues to work construction daily.  His ICD check does not show any episodes of ventricular arrhythmias, he RV paces 0% of the time, his heart rate histograms show good heart rate variation and his ICD battery estimates 6.5 years left.  We will plan to see him back in clinic in 3 mos when he comes to see Jeanie Hernandez NP.  Normal ICD function.    Single ICD

## 2017-07-25 NOTE — PROGRESS NOTES
"  2016      RE: Matt Hester   MRN: [id number]   : 1955      Dear Colleagues:        I had the pleasure of seeing Matt Hester in the [facility] on 2016.  As you know, he is a very pleasant, 61-year-old man with a long-standing history of ischemic cardiomyopathy, and I will detail his cardiac history below.  He has been followed in [place] and was previously seeing Jun Velazquez as well as one of our nurse practitioners, who has now moved here, Jeanie Hernandez.  When he was first diagnosed, his ejection fraction was in the 10%-15% range.  He has now been stable in the 25% range with \"an excellent quality of life\" for many, many years.  He denies any heart failure hospitalizations in the last several years.  He has not had any recurrent anginal symptoms.  He denies syncope.  He has never had a shock from his primary prevention ICD.  He denies lower extremity edema.  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  He also denies PND or orthopnea.      His cardiac history is summarized as follows:  His first MI was in .  In , he had an anterior wall MI requiring stenting.  He had a single-chamber ICD implanted in 2004 for primary prevention and a generator change on 2010 and again in 2011.  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     PAST MEDICAL HISTORY:  Past Medical History   Diagnosis Date     Chronic systolic congestive heart failure (H)      Ischemic cardiomyopathy      ICD (implantable cardioverter-defibrillator) in place      HTN (hypertension)      Hyperlipidemia      Peripheral vascular disease (H)      CVA (cerebral vascular accident) (H)      Atrial fibrillation (H), " paroxysmal         Other medical history includes   - colonic polyp in 11/2010   - atrophic gastritis  -  Haro esophagus   - benign neoplasm of the colon in 2011   - chronic abdominal pain  -  diverticulosis, bile duct hematoma per liver biopsy,  - colectomy 01/05/2011  - a stroke in 10/2011   - right occipital residual vision changes  - Hypertension   - nasal polyp   - ICD placement in 06/2011 for primary prevention.      ALLERGIES:     1.  Aggrenox, severe nausea.   2.  Alcohol, hives.   3.  Effient, rash and itching.   4.  Demerol, itching, nausea and vomiting.   5.  Flagyl.   6.  Levaquin.     SOCIAL HISTORY:  The patient works full-time in the construction business.  He does not drink, smoke or use drugs, and he never has.  [place]  He has 1 son and a 6-year-old grandson that he is very close with.       CURRENT MEDICATIONS:  Current Outpatient Prescriptions   Medication Sig Dispense Refill     enalapril (VASOTEC) 5 MG tablet Take 1 tablet (5 mg) by mouth 2 times daily 30 tablet 1     Clopidogrel Bisulfate (PLAVIX PO) Take 75 mg by mouth daily       SPIRONOLACTONE PO Take 12.5 mg by mouth daily       Rosuvastatin Calcium (CRESTOR PO) Take 40 mg by mouth daily       Magic Mouthwash (FV std formula) lidocaine visc 2% 2.5mL/5mL & maalox/mylanta w/ simeth 2.5mL/5mL & diphenhydrAMINE 5mg/5mL Swish and swallow 10 mLs in mouth every 6 hours as needed for mouth sores       Furosemide (LASIX PO) Take 20 mg by mouth       Carvedilol (COREG PO) Take 12.5 mg by mouth         ROS:   Constitutional: No fever, chills, or sweats. No weight gain/loss.   ENT: No visual disturbance, ear ache, epistaxis, sore throat.   Allergies/Immunologic: Negative.   Respiratory: No cough, hemoptysis.   Cardiovascular: As per HPI.   GI: No nausea, vomiting, hematemesis, melena, or hematochezia.  : No urinary frequency, dysuria, or hematuria.   Integument: Negative.   Psychiatric: Negative.   Neuro: Negative.  "  Endocrinology: Negative.   Musculoskeletal: Negative.    EXAM:  /80 (BP Location: Left arm, Patient Position: Chair, Cuff Size: Adult Regular)  Pulse 77  Ht 1.702 m (5' 7\")  Wt 77.8 kg (171 lb 9.6 oz)  SpO2 95%  BMI 26.88 kg/m2  General: appears comfortable, alert and articulate  Head: normocephalic, atraumatic  Eyes: anicteric sclera, EOMI  Neck: no adenopathy  Orophyarynx: moist mucosa, no lesions, dentition intact  Heart: regular, S1/S2, no murmur, gallop, rub, estimated JVP  <10   Lungs: clear, no rales or wheezing  Abdomen: soft, non-tender, bowel sounds present, no hepatosplenomegaly  Extremities: no clubbing, cyanosis or edema  Neurological: normal speech and affect, no gross motor deficits    Labs:  CBC RESULTS:  Lab Results   Component Value Date    WBC 7.1 07/21/2016    RBC 5.37 07/21/2016    HGB 15.0 07/21/2016    HCT 46.5 07/21/2016    MCV 87 07/21/2016    MCH 27.9 07/21/2016    MCHC 32.3 07/21/2016    RDW 14.0 07/21/2016     07/21/2016       CMP RESULTS:  Lab Results   Component Value Date     04/25/2017    POTASSIUM 3.4 04/25/2017    CHLORIDE 106 04/25/2017    CO2 30 04/25/2017    ANIONGAP 4 04/25/2017    GLC 83 04/25/2017    BUN 9 04/25/2017    CR 0.88 04/25/2017    GFRESTIMATED 88 04/25/2017    GFRESTBLACK >90   GFR Calc   04/25/2017    JD 8.7 04/25/2017    BILITOTAL 0.6 07/21/2016    ALBUMIN 3.4 07/21/2016    ALKPHOS 59 07/21/2016    ALT 17 07/21/2016    AST 13 07/21/2016        INR RESULTS:  Lab Results   Component Value Date    INR 1.00 07/21/2016       Lab Results   Component Value Date    MAG 2.3 07/21/2016     No results found for: NTBNPI  Lab Results   Component Value Date    NTBNP 318 (H) 07/21/2016     Angiogram 2011  CONCLUSIONS:   1. Widely patent stents in the ramus intermedius and LAD.   2. Two-vessel coronary artery disease.  3. Moderate left ventricular chamber enlargement.   4. Left ventricular systolic function is severely reduced with a "   visually estimated ejection fraction of 15 to 20% and global   hypokinesis.   5. Anterior apical and inferior akinesis.   6. Possible left ventricular thrombus.     EVENTS, OUTCOME, INFORMATION, AND PLAN:   1. The vascular sheath was secured in place with plans for   removal on the [address] [place] [address] unit.   2. The patient was transferred to [address] [place] [address] in stable condition   without chest pain or ECG changes.     PLAN:   1. ASAP sheath removal.   2. Aspirin 325 mg p.o. daily.   3. Plavix 75 mg p.o. daily indefinitely    Right heart catheterization from 01/24/2014 shows an RA of 8, RV 30/8, PA 29/16 with a wedge of 11, cardiac output 3.95 L/min, body surface area of 1.87 giving a normal cardiac index, PA saturation of 76%, normal pulmonary vascular resistance.    Echocardiogram from 05/14/2016 shows LV size is normal.  Qualitative ejection fraction is 25%-30%.  There is no mural thrombus.  Apical akinesis.  Severe global hypokinesis of the left ventricle.  Pacemaker noted within the right ventricle.  No atrial septal defect.  I do not have LV dimensions or any valves reported on with this report.  Single-chamber ICD function today shows 1 episode of 9 beats of nonsustained VT.  No tachy therapies.      Last stress test with a Lexiscan 12/30/2013:  Small territories of periinfarct reversibility and 2 large territories of fixed defects, severely reduced EF of 25%.  CT angio of the neck:  Mild atherosclerosis, no significant narrowing or dilation of intracranial circulation.  This was performed on 05/14/2016.     Assessment and Plan:   In summary, this is a very pleasant, 61-year-old man with a long-standing history of ischemic cardiomyopathy with an ejection fraction in the 25%-30% range who has been stable on medical therapy for many years with an ICD for primary prevention.  His N-terminal proBNP is very low today.  His neck veins are not elevated on exam, and he is New York Heart Association class I and is stage C.      Overall,  I think he is on an excellent medical regimen, and I am not changing any of his medications today.  We talked a lot today about his overall prognosis, which I think is excellent at this point despite his low ejection fraction given his absence of hospitalizations, normal end-organ function, normal cardiac output on his last right heart catheterization and his stability over many years.  He is not yet meeting criteria for Entresto, but this remains an option should his natriuretic peptides be elevated the next time that I see him or should his breathing worsen.  We also talked about the possibility of a Koppel trial down the line should his symptoms worsen before he would ever need a transplant or a left ventricular assist device.  He is far too well for any of these therapies presently.      With regard to his coronary artery disease, I recommend that we keep his LDL low.  We will repeat this with his next labs.  He is on aspirin and Plavix, which by last angiogram, they had wanted to keep him on this for life.  In addition, off of Plavix, he had had a stroke, and so we want to keep it on for that indication as well.  He is also on a statin and a beta blocker and is asymptomatic.      With regard to prevention of sudden cardiac death, his defibrillator is working properly.  He does not meet criteria for CRT.  He has 7 years left on the battery.      history of stroke, he has had an echocardiogram since this last possible TIA, which did not show an LV thrombus.  He had a repeat Ziopatch, which did not show any atrial fibrillation; therefore, I do not think he needs anticoagulation.      Please feel free to contact me if you have any further questions.  We will have him see Jeanie Hernandez in 6 months and me in 1 year and sooner as needed.     1. Chronic systolic heart failure secondary to coronary artery disease   Stage C  NYHA Class II  ACEi/ARB yes  BB yes  Aldosterone antagonist yes  SCD prophylaxis ICD  % BiV  pacing: N/A  Fluid status euvolemic  NSAID use: none     Follow-up 6 months with [facility], 1 year with me and sooner PRN. We will perform an echo next year.     The patient was seen and examined with cardiology fellow Zach Torres.     Kimberly Maynard MD   of Medicine   [facility] Division of Cardiology         CC  Patient Care Team:  Maykel Barber MD as PCP - General (Internal Medicine)  Jael Roblero RN as Nurse Coordinator (Cardiology)  Kimberly Maynard MD as MD (Cardiology)  Savana Renae, RN as Nurse Coordinator (Cardiology)  Edel Hernandez, APRN CNP as Nurse Practitioner (Nurse Practitioner - Gerontology)  EDEL HERNANDEZ

## 2017-07-25 NOTE — MR AVS SNAPSHOT
After Visit Summary   7/25/2017    Matt Hester    MRN: [id number]           Patient Information     Date Of Birth          1955        Visit Information        Provider Department      7/25/2017 9:00 AM 1, Uc Cv Device [facility]        Today's Diagnoses     Ischemic cardiomyopathy    -  1      Care Instructions    It was a pleasure to see you in clinic today.  Please do not hesitate to call us if you have any questions or concerns.  Please return to clinic in 3 mos for a ICD check.      Kerrie De Dios R.N.  Electrophysiology nurse specialist  [facility]  [address] [place] [id number]     Jingalok Brody  [phone] business hours    After business hours please call [phone], option #[phone], and ask for Job code [id number].                  Follow-ups after your visit        Follow-up notes from your care team     Discussed this visit Return in about 3 months (around 10/25/2017) for ICD check, Jeanie Hernandez NP.      Your next 10 appointments already scheduled     Jul 25, 2017  9:30 AM CDT   (Arrive by 9:15 AM)   RETURN HEART FAILURE with Kimberly Maynard MD   [facility])    [address] [place] [id number] [phone]            Oct 03, 2017  2:30 PM CDT   Lab with UC LAB   [facility])    [address] [place] [id number] [phone]            Oct 03, 2017  3:00 PM CDT   (Arrive by 2:45 PM)   Implanted Defibulator with Uc Cv Device 1   [facility])    [address] [place] [id number] [phone]            Oct 03, 2017  3:30 PM CDT   (Arrive by 3:15 PM)   CORE RETURN with NEEMA [facility])    [address] [place] [id number]    [phone]              Who to contact     If you have questions or need follow up information about today's clinic visit or your schedule please contact [facility] directly at [phone].  Normal or non-critical lab and imaging results will be communicated to you by MyChart, letter or phone within 4 business days after the clinic has received the results. If you do not hear from us within 7 days, please contact the clinic through MyChart or phone. If you have a critical or abnormal lab result, we will notify you by phone as soon as possible.  Submit refill requests through [employer] or call your pharmacy and they will forward the refill request to us. Please allow 3 business days for your refill to be completed.          Additional Information About Your Visit        [email or web address] Information     [employer] gives you secure access to your electronic health record. If you see a primary care provider, you can also send messages to your care team and make appointments. If you have questions, please call your primary care clinic.  If you do not have a primary care provider, please call [phone] and they will assist you.        Care EveryWhere ID     This is your Care EveryWhere ID. This could be used by other organizations to access your [facility] medical records  [id number]         Blood Pressure from Last 3 Encounters:   04/25/17 123/84   01/10/17 116/63   07/21/16 119/82    Weight from Last 3 Encounters:   04/25/17 76.7 kg (169 lb)   01/10/17 80.2 kg (176 lb 14.4 oz)   07/21/16 75.3 kg (166 lb)              We Performed the Following     ICD DEVICE PROGRAMMING EVAL, SINGLE LEAD ICD        Primary Care Provider Office Phone # Fax #    Maykel Barber [phone]       [place] [address] [place] [phone]        Equal Access to Services     CHAYO ALICEA : Uri Moss, renita tovar, qamaria luzta tony chavarria, jeanie barragan. So  [facility] [phone].    ATENCIÓN: Si sally wellington, tiene a castaneda disposición servicios gratuitos de asistencia lingüística. Gabrielle armendariz [phone].    We comply with applicable federal civil rights laws and [place] laws. We do not discriminate on the basis of race, color, national origin, age, disability sex, sexual orientation or gender identity.            Thank you!     Thank you for choosing [facility]  for your care. Our goal is always to provide you with excellent care. Hearing back from our patients is one way we can continue to improve our services. Please take a few minutes to complete the written survey that you may receive in the mail after your visit with us. Thank you!             Your Updated Medication List - Protect others around you: Learn how to safely use, store and throw away your medicines at www.disposemymeds.org.          This list is accurate as of: 7/25/17  9:24 AM.  Always use your most recent med list.                   Brand Name Dispense Instructions for use Diagnosis    COREG PO      Take 12.5 mg by mouth        CRESTOR PO      Take 40 mg by mouth daily        enalapril 5 MG tablet    VASOTEC    30 tablet    Take 1 tablet (5 mg) by mouth 2 times daily    Ischemic cardiomyopathy       LASIX PO      Take 20 mg by mouth        lidocaine visc 2% 2.5mL/5mL & maalox/mylanta w/ simeth 2.5mL/5mL & diphenhydrAMINE 5mg/5mL Susp suspension    MAG Mouthwash HOSPITAL     Swish and swallow 10 mLs in mouth every 6 hours as needed for mouth sores        PLAVIX PO      Take 75 mg by mouth daily        SPIRONOLACTONE PO      Take 12.5 mg by mouth daily

## 2017-07-25 NOTE — PROGRESS NOTES
"  17    RE: Matt Hester   MRN: [id number]   : 1955      Dear Colleagues:        I had the pleasure of seeing Matt Hester in the [facility] on 2017.  As you know, he is a very pleasant, 61-year-old man with a long-standing history of ischemic cardiomyopathy, and I will detail his cardiac history below.  He has been followed in [place] and was previously seeing Jun Velazquez as well as one of our nurse practitioners, who has now moved here, Jeanie Hernandez.  When he was first diagnosed, his ejection fraction was in the 10%-15% range.  He has now been stable in the 25% range with \"an excellent quality of life\" for many, many years.      His cardiac history is summarized as follows:  His first MI was in .  In , he had an anterior wall MI requiring stenting.  He had a single-chamber ICD implanted in 2004 for primary prevention and a generator change on 2010 and again in 2011.  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  He has regained function of his left arm but his visual deficit persists.    Today, he tells me that he is doing well.  He feels about the same as a year ago.  He works a lot and does heavy lifting without chest pain, shortness of breath or palpitations.  He does not if he does heavy lifting he can get a little lightheaded but this is rare and unchanged for the past year.  He still does everything he likes.  Weight is up a few pounds but denies edema, PND or orthopnea.  No GI bleeding or other evidence of blood loss.  He uses PRN lasix but has taken only 2 tablets in the course of the year without a change in his symptoms.  He takes his coreg to total 50 mg a day but divides it to avoid lowering his BP too much.  During the winter he does have to cut this back a bit.    PAST MEDICAL HISTORY:  Past Medical " History   Diagnosis Date     Chronic systolic congestive heart failure (H)      Ischemic cardiomyopathy      ICD (implantable cardioverter-defibrillator) in place      HTN (hypertension)      Hyperlipidemia      Peripheral vascular disease (H)      CVA (cerebral vascular accident) (H)      Atrial fibrillation (H), paroxysmal           Other medical history includes   - colonic polyp in 11/2010   - atrophic gastritis  -  Haro esophagus   - benign neoplasm of the colon in 2011   - chronic abdominal pain  -  diverticulosis, bile duct hematoma per liver biopsy,  - colectomy 01/05/2011  - a stroke in 10/2011   - right occipital residual vision changes  - Hypertension   - nasal polyp   - ICD placement in 06/2011 for primary prevention.      ALLERGIES:     1.  Aggrenox, severe nausea.   2.  Alcohol, hives.   3.  Effient, rash and itching.   4.  Demerol, itching, nausea and vomiting.   5.  Flagyl.   6.  Levaquin.     SOCIAL HISTORY:  The patient works full-time in the construction business.  He does not drink, smoke or use drugs, and he never has.  [place]  He has 1 son and a 6-year-old grandson that he is very close with.       CURRENT MEDICATIONS:  Current Outpatient Prescriptions   Medication Sig Dispense Refill     enalapril (VASOTEC) 5 MG tablet Take 1 tablet (5 mg) by mouth 2 times daily 30 tablet 1     Clopidogrel Bisulfate (PLAVIX PO) Take 75 mg by mouth daily       SPIRONOLACTONE PO Take 12.5 mg by mouth daily       Rosuvastatin Calcium (CRESTOR PO) Take 40 mg by mouth daily       Magic Mouthwash (FV std formula) lidocaine visc 2% 2.5mL/5mL & maalox/mylanta w/ simeth 2.5mL/5mL & diphenhydrAMINE 5mg/5mL Swish and swallow 10 mLs in mouth every 6 hours as needed for mouth sores       Furosemide (LASIX PO) Take 20 mg by mouth       Carvedilol (COREG PO) Take 12.5 mg by mouth         ROS:   Constitutional: No fever, chills, or sweats. No weight gain/loss.   ENT: No visual disturbance,  "ear ache, epistaxis, sore throat.   Allergies/Immunologic: Negative.   Respiratory: No cough, hemoptysis.   Cardiovascular: As per HPI.   GI: No nausea, vomiting, hematemesis, melena, or hematochezia.  : No urinary frequency, dysuria, or hematuria.   Integument: Negative.   Psychiatric: Negative.   Neuro: Negative.   Endocrinology: Negative.   Musculoskeletal: Negative.    EXAM:  /80 (BP Location: Left arm, Patient Position: Chair, Cuff Size: Adult Regular)  Pulse 77  Ht 1.702 m (5' 7\")  Wt 77.8 kg (171 lb 9.6 oz)  SpO2 95%  BMI 26.88 kg/m2  General: appears comfortable, alert and articulate  Head: normocephalic, atraumatic  Eyes: anicteric sclera, EOMI  Neck: no adenopathy  Orophyarynx: moist mucosa, no lesions, dentition intact  Heart: regular, S1/S2, no murmur, gallop, rub, estimated JVP  <10   Lungs: clear, no rales or wheezing  Abdomen: soft, non-tender, bowel sounds present, no hepatosplenomegaly  Extremities: no clubbing, cyanosis or edema  Neurological: normal speech and affect, no gross motor deficits    Labs:  CBC RESULTS:  Lab Results   Component Value Date    WBC 7.1 07/21/2016    RBC 5.37 07/21/2016    HGB 15.0 07/21/2016    HCT 46.5 07/21/2016    MCV 87 07/21/2016    MCH 27.9 07/21/2016    MCHC 32.3 07/21/2016    RDW 14.0 07/21/2016     07/21/2016       CMP RESULTS:  Lab Results   Component Value Date     04/25/2017    POTASSIUM 3.4 04/25/2017    CHLORIDE 106 04/25/2017    CO2 30 04/25/2017    ANIONGAP 4 04/25/2017    GLC 83 04/25/2017    BUN 9 04/25/2017    CR 0.88 04/25/2017    GFRESTIMATED 88 04/25/2017    GFRESTBLACK >90   GFR Calc   04/25/2017    JD 8.7 04/25/2017    BILITOTAL 0.6 07/21/2016    ALBUMIN 3.4 07/21/2016    ALKPHOS 59 07/21/2016    ALT 17 07/21/2016    AST 13 07/21/2016        INR RESULTS:  Lab Results   Component Value Date    INR 1.00 07/21/2016       Lab Results   Component Value Date    MAG 2.3 07/21/2016     No results found for: " NTBN  Lab Results   Component Value Date    NTBNP 318 (H) 07/21/2016     Angiogram 2011  CONCLUSIONS:   1. Widely patent stents in the ramus intermedius and LAD.   2. Two-vessel coronary artery disease.  3. Moderate left ventricular chamber enlargement.   4. Left ventricular systolic function is severely reduced with a   visually estimated ejection fraction of 15 to 20% and global   hypokinesis.   5. Anterior apical and inferior akinesis.   6. Possible left ventricular thrombus.     Right heart catheterization from 01/24/2014 shows an RA of 8, RV 30/8, PA 29/16 with a wedge of 11, cardiac output 3.95 L/min, body surface area of 1.87 giving a normal cardiac index, PA saturation of 76%, normal pulmonary vascular resistance.    Echocardiogram from 05/14/2016 shows LV size is normal.  Qualitative ejection fraction is 25%-30%.  There is no mural thrombus.  Apical akinesis.  Severe global hypokinesis of the left ventricle.  Pacemaker noted within the right ventricle.  No atrial septal defect.  I do not have LV dimensions or any valves reported on with this report.  Single-chamber ICD function today shows 1 episode of 9 beats of nonsustained VT.  No tachy therapies.      Last stress test with a Lexiscan 12/30/2013:  Small territories of periinfarct reversibility and 2 large territories of fixed defects, severely reduced EF of 25%.  CT angio of the neck:  Mild atherosclerosis, no significant narrowing or dilation of intracranial circulation.  This was performed on 05/14/2016.     ICD Interrogation:  His ICD check does not show any episodes of ventricular arrhythmias, he RV paces 0% of the time, his heart rate histograms show good heart rate variation and his ICD battery estimates 6.5 years left.     Assessment and Plan:   In summary, this is a very pleasant, 61-year-old man with a long-standing history of ischemic cardiomyopathy with an ejection fraction in the 25%-30% range who has been stable on medical therapy for many  years with an ICD for primary prevention.  Today, he appears euvolemic without evidence of volume overload and has been without any heart failure hospitalizations.  He is NYHA class I, stage C today.  He is on the correct medication regimen including high dose statin, Crestor 40 mg daily, enalapril 5 mg twice daily, a full dose aspirin, coreg, aldactone and Plavix.  His labs were reviewed from April which were unremarkable.  He remains at this time too well for consideration of advanced therapies at this time and we should continue the current medical regimen.     He is on aspirin and Plavix, which by last angiogram, they had wanted to keep him on this for life.  In addition, off of Plavix, he had had a stroke, and so we want to keep it on for that indication as well.  He is also on a statin and a beta blocker and is asymptomatic.      With regard to prevention of sudden cardiac death, his defibrillator is working properly.  He does not meet criteria for CRT.    With regards to his history of stroke, he has had an echocardiogram since this last possible TIA, which did not show an LV thrombus or shunt.  He had a repeat Ziopatch, which did not show any atrial fibrillation; therefore, I do not think he needs anticoagulation. He is on a full dose aspirin and Plavix for his history of coronary disease as detailed above.     Please feel free to contact me if you have any further questions.  We will have him see CORE in 6 months and me in 1 year and sooner as needed.  Plan for a repeat echocardiogram with next visit.    1. Chronic systolic heart failure secondary to coronary artery disease.  Ischemic cardiomyopathy.  Stage C  NYHA Class II  ACEi/ARB yes  BB yes  Aldosterone antagonist yes  SCD prophylaxis ICD  % BiV pacing: N/A  Fluid status euvolemic  NSAID use: none     CC  Patient Care Team:  Maykel Barber MD as PCP - General (Internal Medicine)  Jael Roblero, RN as Nurse Coordinator  (Cardiology)  Kimberly Maynard MD as MD (Cardiology)  Savana Renae, RN as Nurse Coordinator (Cardiology)  Edel Hernandez APRN CNP as Nurse Practitioner (Nurse Practitioner - Gerontology)  EDEL HERNANDEZ MD  Cardiology Fellow

## 2017-07-25 NOTE — LETTER
"2017      RE: Matt Hester  [address]  [facility] [address]       Dear Colleague,    Thank you for the opportunity to participate in the care of your patient, Matt Hester, at the [facility] HEART [facility] at [facility]. Please see a copy of my visit note below.      17    RE: Matt Hester   MRN: [id number]   : 1955      Dear Colleagues:        I had the pleasure of seeing Matt Hester in the [facility] on 2017.  As you know, he is a very pleasant, 61-year-old man with a long-standing history of ischemic cardiomyopathy, and I will detail his cardiac history below.  He has been followed in [place] and was previously seeing Jun Velazquez as well as one of our nurse practitioners, who has now moved here, Jeanie Hernandez.  When he was first diagnosed, his ejection fraction was in the 10%-15% range.  He has now been stable in the 25% range with \"an excellent quality of life\" for many, many years.      His cardiac history is summarized as follows:  His first MI was in .  In , he had an anterior wall MI requiring stenting.  He had a single-chamber ICD implanted in 2004 for primary prevention and a generator change on 2010 and again in 2011.  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  He has regained function of his left arm but his visual deficit persists.    Today, he tells me that he is doing well.  He feels about the same as a year ago.  He works a lot and does heavy lifting without chest pain, shortness of breath or palpitations.  He does not if he does heavy lifting he can get a little lightheaded but this is rare and unchanged for the past year.  He still does everything he likes.  Weight is up a few pounds but denies edema, PND or orthopnea.  No GI bleeding or other " evidence of blood loss.  He uses PRN lasix but has taken only 2 tablets in the course of the year without a change in his symptoms.  He takes his coreg to total 50 mg a day but divides it to avoid lowering his BP too much.  During the winter he does have to cut this back a bit.    PAST MEDICAL HISTORY:  Past Medical History   Diagnosis Date     Chronic systolic congestive heart failure (H)      Ischemic cardiomyopathy      ICD (implantable cardioverter-defibrillator) in place      HTN (hypertension)      Hyperlipidemia      Peripheral vascular disease (H)      CVA (cerebral vascular accident) (H)      Atrial fibrillation (H), paroxysmal           Other medical history includes   - colonic polyp in 11/2010   - atrophic gastritis  -  Haro esophagus   - benign neoplasm of the colon in 2011   - chronic abdominal pain  -  diverticulosis, bile duct hematoma per liver biopsy,  - colectomy 01/05/2011  - a stroke in 10/2011   - right occipital residual vision changes  - Hypertension   - nasal polyp   - ICD placement in 06/2011 for primary prevention.      ALLERGIES:     1.  Aggrenox, severe nausea.   2.  Alcohol, hives.   3.  Effient, rash and itching.   4.  Demerol, itching, nausea and vomiting.   5.  Flagyl.   6.  Levaquin.     SOCIAL HISTORY:  The patient works full-time in the construction business.  He does not drink, smoke or use drugs, and he never has.  [place] of [place].  He has 1 son and a 6-year-old grandson that he is very close with.       CURRENT MEDICATIONS:  Current Outpatient Prescriptions   Medication Sig Dispense Refill     enalapril (VASOTEC) 5 MG tablet Take 1 tablet (5 mg) by mouth 2 times daily 30 tablet 1     Clopidogrel Bisulfate (PLAVIX PO) Take 75 mg by mouth daily       SPIRONOLACTONE PO Take 12.5 mg by mouth daily       Rosuvastatin Calcium (CRESTOR PO) Take 40 mg by mouth daily       Magic Mouthwash (FV std formula) lidocaine visc 2% 2.5mL/5mL & maalox/mylanta w/ simeth  "2.5mL/5mL & diphenhydrAMINE 5mg/5mL Swish and swallow 10 mLs in mouth every 6 hours as needed for mouth sores       Furosemide (LASIX PO) Take 20 mg by mouth       Carvedilol (COREG PO) Take 12.5 mg by mouth         ROS:   Constitutional: No fever, chills, or sweats. No weight gain/loss.   ENT: No visual disturbance, ear ache, epistaxis, sore throat.   Allergies/Immunologic: Negative.   Respiratory: No cough, hemoptysis.   Cardiovascular: As per HPI.   GI: No nausea, vomiting, hematemesis, melena, or hematochezia.  : No urinary frequency, dysuria, or hematuria.   Integument: Negative.   Psychiatric: Negative.   Neuro: Negative.   Endocrinology: Negative.   Musculoskeletal: Negative.    EXAM:  /80 (BP Location: Left arm, Patient Position: Chair, Cuff Size: Adult Regular)  Pulse 77  Ht 1.702 m (5' 7\")  Wt 77.8 kg (171 lb 9.6 oz)  SpO2 95%  BMI 26.88 kg/m2  General: appears comfortable, alert and articulate  Head: normocephalic, atraumatic  Eyes: anicteric sclera, EOMI  Neck: no adenopathy  Orophyarynx: moist mucosa, no lesions, dentition intact  Heart: regular, S1/S2, no murmur, gallop, rub, estimated JVP  <10   Lungs: clear, no rales or wheezing  Abdomen: soft, non-tender, bowel sounds present, no hepatosplenomegaly  Extremities: no clubbing, cyanosis or edema  Neurological: normal speech and affect, no gross motor deficits    Labs:  CBC RESULTS:  Lab Results   Component Value Date    WBC 7.1 07/21/2016    RBC 5.37 07/21/2016    HGB 15.0 07/21/2016    HCT 46.5 07/21/2016    MCV 87 07/21/2016    MCH 27.9 07/21/2016    MCHC 32.3 07/21/2016    RDW 14.0 07/21/2016     07/21/2016       CMP RESULTS:  Lab Results   Component Value Date     04/25/2017    POTASSIUM 3.4 04/25/2017    CHLORIDE 106 04/25/2017    CO2 30 04/25/2017    ANIONGAP 4 04/25/2017    GLC 83 04/25/2017    BUN 9 04/25/2017    CR 0.88 04/25/2017    GFRESTIMATED 88 04/25/2017    GFRESTBLACK >90   GFR Calc   04/25/2017 "    JD 8.7 04/25/2017    BILITOTAL 0.6 07/21/2016    ALBUMIN 3.4 07/21/2016    ALKPHOS 59 07/21/2016    ALT 17 07/21/2016    AST 13 07/21/2016        INR RESULTS:  Lab Results   Component Value Date    INR 1.00 07/21/2016       Lab Results   Component Value Date    MAG 2.3 07/21/2016     No results found for: NTBNPI  Lab Results   Component Value Date    NTBNP 318 (H) 07/21/2016     Angiogram 2011  CONCLUSIONS:   1. Widely patent stents in the ramus intermedius and LAD.   2. Two-vessel coronary artery disease.  3. Moderate left ventricular chamber enlargement.   4. Left ventricular systolic function is severely reduced with a   visually estimated ejection fraction of 15 to 20% and global   hypokinesis.   5. Anterior apical and inferior akinesis.   6. Possible left ventricular thrombus.     Right heart catheterization from 01/24/2014 shows an RA of 8, RV 30/8, PA 29/16 with a wedge of 11, cardiac output 3.95 L/min, body surface area of 1.87 giving a normal cardiac index, PA saturation of 76%, normal pulmonary vascular resistance.    Echocardiogram from 05/14/2016 shows LV size is normal.  Qualitative ejection fraction is 25%-30%.  There is no mural thrombus.  Apical akinesis.  Severe global hypokinesis of the left ventricle.  Pacemaker noted within the right ventricle.  No atrial septal defect.  I do not have LV dimensions or any valves reported on with this report.  Single-chamber ICD function today shows 1 episode of 9 beats of nonsustained VT.  No tachy therapies.      Last stress test with a Lexiscan 12/30/2013:  Small territories of periinfarct reversibility and 2 large territories of fixed defects, severely reduced EF of 25%.  CT angio of the neck:  Mild atherosclerosis, no significant narrowing or dilation of intracranial circulation.  This was performed on 05/14/2016.     ICD Interrogation:  His ICD check does not show any episodes of ventricular arrhythmias, he RV paces 0% of the time, his heart rate  histograms show good heart rate variation and his ICD battery estimates 6.5 years left.     Assessment and Plan:   In summary, this is a very pleasant, 61-year-old man with a long-standing history of ischemic cardiomyopathy with an ejection fraction in the 25%-30% range who has been stable on medical therapy for many years with an ICD for primary prevention.  Today, he appears euvolemic without evidence of volume overload and has been without any heart failure hospitalizations.  He is NYHA class I, stage C today.  He is on the correct medication regimen including high dose statin, Crestor 40 mg daily, enalapril 5 mg twice daily, a full dose aspirin, coreg, aldactone and Plavix.  His labs were reviewed from April which were unremarkable.  He remains at this time too well for consideration of advanced therapies at this time and we should continue the current medical regimen.     He is on aspirin and Plavix, which by last angiogram, they had wanted to keep him on this for life.  In addition, off of Plavix, he had had a stroke, and so we want to keep it on for that indication as well.  He is also on a statin and a beta blocker and is asymptomatic.      With regard to prevention of sudden cardiac death, his defibrillator is working properly.  He does not meet criteria for CRT.    With regards to his history of stroke, he has had an echocardiogram since this last possible TIA, which did not show an LV thrombus or shunt.  He had a repeat Ziopatch, which did not show any atrial fibrillation; therefore, I do not think he needs anticoagulation. He is on a full dose aspirin and Plavix for his history of coronary disease as detailed above.     Please feel free to contact me if you have any further questions.  We will have him see CORE in 6 months and me in 1 year and sooner as needed.  Plan for a repeat echocardiogram with next visit.    1. Chronic systolic heart failure secondary to coronary artery disease.  Ischemic  "cardiomyopathy.  Stage C  NYHA Class II  ACEi/ARB yes  BB yes  Aldosterone antagonist yes  SCD prophylaxis ICD  % BiV pacing: N/A  Fluid status euvolemic  NSAID use: none     CC  Patient Care Team:  Maykel Barber MD as PCP - General (Internal Medicine)  Jael Roblero, RN as Nurse Coordinator (Cardiology)  Kimberly Maynard MD as MD (Cardiology)  Savana Renae RN as Nurse Coordinator (Cardiology)  Edel Hernandez, APRN CNP as Nurse Practitioner (Nurse Practitioner - Gerontology)  EDEL HERNANDEZ MD  Cardiology Fellow          2016      RE: Matt Hester   MRN: [id number]   : 1955      Dear Colleagues:        I had the pleasure of seeing Matt Hester in the [facility] on 2016.  As you know, he is a very pleasant, 61-year-old man with a long-standing history of ischemic cardiomyopathy, and I will detail his cardiac history below.  He has been followed in [place] and was previously seeing Jun Velazquez as well as one of our nurse practitioners, who has now moved here, Edel Hernandez.  When he was first diagnosed, his ejection fraction was in the 10%-15% range.  He has now been stable in the 25% range with \"an excellent quality of life\" for many, many years.  He denies any heart failure hospitalizations in the last several years.  He has not had any recurrent anginal symptoms.  He denies syncope.  He has never had a shock from his primary prevention ICD.  He denies lower extremity edema.  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  He also denies PND or orthopnea.      His cardiac history is summarized as follows:  His first MI was in .  In , he had an anterior wall MI requiring stenting.  He had a single-chamber ICD implanted in 2004 for primary prevention and a " generator change on 01/2010 and again in 06/2011.  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     PAST MEDICAL HISTORY:  Past Medical History   Diagnosis Date     Chronic systolic congestive heart failure (H)      Ischemic cardiomyopathy      ICD (implantable cardioverter-defibrillator) in place      HTN (hypertension)      Hyperlipidemia      Peripheral vascular disease (H)      CVA (cerebral vascular accident) (H)      Atrial fibrillation (H), paroxysmal         Other medical history includes   - colonic polyp in 11/2010   - atrophic gastritis  -  Haro esophagus   - benign neoplasm of the colon in 2011   - chronic abdominal pain  -  diverticulosis, bile duct hematoma per liver biopsy,  - colectomy 01/05/2011  - a stroke in 10/2011   - right occipital residual vision changes  - Hypertension   - nasal polyp   - ICD placement in 06/2011 for primary prevention.      ALLERGIES:     1.  Aggrenox, severe nausea.   2.  Alcohol, hives.   3.  Effient, rash and itching.   4.  Demerol, itching, nausea and vomiting.   5.  Flagyl.   6.  Levaquin.     SOCIAL HISTORY:  The patient works full-time in the construction business.  He does not drink, smoke or use drugs, and he never has.  [place] minutes north of [place].  He has 1 son and a 6-year-old grandson that he is very close with.       CURRENT MEDICATIONS:  Current Outpatient Prescriptions   Medication Sig Dispense Refill     enalapril (VASOTEC) 5 MG tablet Take 1 tablet (5 mg) by mouth 2 times daily 30 tablet 1     Clopidogrel Bisulfate (PLAVIX PO) Take 75 mg by mouth daily       SPIRONOLACTONE PO Take 12.5 mg by mouth daily       Rosuvastatin Calcium (CRESTOR PO) Take 40 mg by mouth daily       Magic Mouthwash (FV std formula) lidocaine visc 2% 2.5mL/5mL & maalox/mylanta w/ simeth 2.5mL/5mL & diphenhydrAMINE 5mg/5mL Swish and swallow 10 mLs in mouth  "every 6 hours as needed for mouth sores       Furosemide (LASIX PO) Take 20 mg by mouth       Carvedilol (COREG PO) Take 12.5 mg by mouth         ROS:   Constitutional: No fever, chills, or sweats. No weight gain/loss.   ENT: No visual disturbance, ear ache, epistaxis, sore throat.   Allergies/Immunologic: Negative.   Respiratory: No cough, hemoptysis.   Cardiovascular: As per HPI.   GI: No nausea, vomiting, hematemesis, melena, or hematochezia.  : No urinary frequency, dysuria, or hematuria.   Integument: Negative.   Psychiatric: Negative.   Neuro: Negative.   Endocrinology: Negative.   Musculoskeletal: Negative.    EXAM:  /80 (BP Location: Left arm, Patient Position: Chair, Cuff Size: Adult Regular)  Pulse 77  Ht 1.702 m (5' 7\")  Wt 77.8 kg (171 lb 9.6 oz)  SpO2 95%  BMI 26.88 kg/m2  General: appears comfortable, alert and articulate  Head: normocephalic, atraumatic  Eyes: anicteric sclera, EOMI  Neck: no adenopathy  Orophyarynx: moist mucosa, no lesions, dentition intact  Heart: regular, S1/S2, no murmur, gallop, rub, estimated JVP  <10   Lungs: clear, no rales or wheezing  Abdomen: soft, non-tender, bowel sounds present, no hepatosplenomegaly  Extremities: no clubbing, cyanosis or edema  Neurological: normal speech and affect, no gross motor deficits    Labs:  CBC RESULTS:  Lab Results   Component Value Date    WBC 7.1 07/21/2016    RBC 5.37 07/21/2016    HGB 15.0 07/21/2016    HCT 46.5 07/21/2016    MCV 87 07/21/2016    MCH 27.9 07/21/2016    MCHC 32.3 07/21/2016    RDW 14.0 07/21/2016     07/21/2016       CMP RESULTS:  Lab Results   Component Value Date     04/25/2017    POTASSIUM 3.4 04/25/2017    CHLORIDE 106 04/25/2017    CO2 30 04/25/2017    ANIONGAP 4 04/25/2017    GLC 83 04/25/2017    BUN 9 04/25/2017    CR 0.88 04/25/2017    GFRESTIMATED 88 04/25/2017    GFRESTBLACK >90   GFR Calc   04/25/2017    JD 8.7 04/25/2017    BILITOTAL 0.6 07/21/2016    ALBUMIN 3.4 " 07/21/2016    ALKPHOS 59 07/21/2016    ALT 17 07/21/2016    AST 13 07/21/2016        INR RESULTS:  Lab Results   Component Value Date    INR 1.00 07/21/2016       Lab Results   Component Value Date    MAG 2.3 07/21/2016     No results found for: NTBNPI  Lab Results   Component Value Date    NTBNP 318 (H) 07/21/2016     Angiogram 2011  CONCLUSIONS:   1. Widely patent stents in the ramus intermedius and LAD.   2. Two-vessel coronary artery disease.  3. Moderate left ventricular chamber enlargement.   4. Left ventricular systolic function is severely reduced with a   visually estimated ejection fraction of 15 to 20% and global   hypokinesis.   5. Anterior apical and inferior akinesis.   6. Possible left ventricular thrombus.     EVENTS, OUTCOME, INFORMATION, AND PLAN:   1. The vascular sheath was secured in place with plans for   removal on the [address] [place] [address] unit.   2. The patient was transferred to [address] [place] [address] in stable condition   without chest pain or ECG changes.     PLAN:   1. ASAP sheath removal.   2. Aspirin 325 mg p.o. daily.   3. Plavix 75 mg p.o. daily indefinitely    Right heart catheterization from 01/24/2014 shows an RA of 8, RV 30/8, PA 29/16 with a wedge of 11, cardiac output 3.95 L/min, body surface area of 1.87 giving a normal cardiac index, PA saturation of 76%, normal pulmonary vascular resistance.    Echocardiogram from 05/14/2016 shows LV size is normal.  Qualitative ejection fraction is 25%-30%.  There is no mural thrombus.  Apical akinesis.  Severe global hypokinesis of the left ventricle.  Pacemaker noted within the right ventricle.  No atrial septal defect.  I do not have LV dimensions or any valves reported on with this report.  Single-chamber ICD function today shows 1 episode of 9 beats of nonsustained VT.  No tachy therapies.      Last stress test with a Lexiscan 12/30/2013:  Small territories of periinfarct reversibility and 2 large territories of fixed defects, severely reduced EF of 25%.  CT angio  of the neck:  Mild atherosclerosis, no significant narrowing or dilation of intracranial circulation.  This was performed on 05/14/2016.     Assessment and Plan:   In summary, this is a very pleasant, 61-year-old man with a long-standing history of ischemic cardiomyopathy with an ejection fraction in the 25%-30% range who has been stable on medical therapy for many years with an ICD for primary prevention.  His N-terminal proBNP is very low today.  His neck veins are not elevated on exam, and he is New York Heart Association class I and is stage C.      Overall, I think he is on an excellent medical regimen, and I am not changing any of his medications today.  We talked a lot today about his overall prognosis, which I think is excellent at this point despite his low ejection fraction given his absence of hospitalizations, normal end-organ function, normal cardiac output on his last right heart catheterization and his stability over many years.  He is not yet meeting criteria for Entresto, but this remains an option should his natriuretic peptides be elevated the next time that I see him or should his breathing worsen.  We also talked about the possibility of a [place] trial down the line should his symptoms worsen before he would ever need a transplant or a left ventricular assist device.  He is far too well for any of these therapies presently.      With regard to his coronary artery disease, I recommend that we keep his LDL low.  We will repeat this with his next labs.  He is on aspirin and Plavix, which by last angiogram, they had wanted to keep him on this for life.  In addition, off of Plavix, he had had a stroke, and so we want to keep it on for that indication as well.  He is also on a statin and a beta blocker and is asymptomatic.      With regard to prevention of sudden cardiac death, his defibrillator is working properly.  He does not meet criteria for CRT.  He has 7 years left on the battery.       history of stroke, he has had an echocardiogram since this last possible TIA, which did not show an LV thrombus.  He had a repeat Ziopatch, which did not show any atrial fibrillation; therefore, I do not think he needs anticoagulation.      Please feel free to contact me if you have any further questions.  We will have him see Edel Hernandez in 6 months and me in 1 year and sooner as needed.     1. Chronic systolic heart failure secondary to coronary artery disease   Stage C  NYHA Class II  ACEi/ARB yes  BB yes  Aldosterone antagonist yes  SCD prophylaxis ICD  % BiV pacing: N/A  Fluid status euvolemic  NSAID use: none     Follow-up 6 months with CORE, 1 year with me and sooner PRN. We will perform an echo next year.     The patient was seen and examined with cardiology fellow Zach Torres.     Kimberly Maynard MD   of Medicine   [facility] Division of Cardiology         CC  Patient Care Team:  Maykel Barber MD as PCP - General (Internal Medicine)  Jael Roblero RN as Nurse Coordinator (Cardiology)  Kimberly Maynard MD as MD (Cardiology)  Savana Renae, RN as Nurse Coordinator (Cardiology)  Edel Hernandez, APRN CNP as Nurse Practitioner (Nurse Practitioner - Gerontology)  EDEL HERNANDEZ

## 2017-09-21 ENCOUNTER — PRE VISIT (OUTPATIENT)
Dept: CARDIOLOGY | Facility: CLINIC | Age: 62
End: 2017-09-21

## 2017-09-21 DIAGNOSIS — I50.22 CHRONIC SYSTOLIC HEART FAILURE (H): Primary | ICD-10-CM

## 2017-10-03 ENCOUNTER — ALLIED HEALTH/NURSE VISIT (OUTPATIENT)
Dept: CARDIOLOGY | Facility: CLINIC | Age: 62
End: 2017-10-03
Attending: NURSE PRACTITIONER
Payer: MEDICARE

## 2017-10-03 VITALS
HEART RATE: 78 BPM | SYSTOLIC BLOOD PRESSURE: 114 MMHG | BODY MASS INDEX: 26.43 KG/M2 | WEIGHT: 168.4 LBS | HEIGHT: 67 IN | OXYGEN SATURATION: 95 % | DIASTOLIC BLOOD PRESSURE: 78 MMHG

## 2017-10-03 DIAGNOSIS — I50.22 CHRONIC SYSTOLIC HEART FAILURE (H): ICD-10-CM

## 2017-10-03 DIAGNOSIS — I25.5 ISCHEMIC CARDIOMYOPATHY: Primary | ICD-10-CM

## 2017-10-03 DIAGNOSIS — E83.51 HYPOCALCEMIA: Primary | ICD-10-CM

## 2017-10-03 LAB
ANION GAP SERPL CALCULATED.3IONS-SCNC: 4 MMOL/L (ref 3–14)
BUN SERPL-MCNC: 8 MG/DL (ref 7–30)
CALCIUM SERPL-MCNC: 8.2 MG/DL (ref 8.5–10.1)
CHLORIDE SERPL-SCNC: 100 MMOL/L (ref 94–109)
CO2 SERPL-SCNC: 32 MMOL/L (ref 20–32)
CREAT SERPL-MCNC: 0.92 MG/DL (ref 0.66–1.25)
GFR SERPL CREATININE-BSD FRML MDRD: 83 ML/MIN/1.7M2
GLUCOSE SERPL-MCNC: 94 MG/DL (ref 70–99)
POTASSIUM SERPL-SCNC: 3.7 MMOL/L (ref 3.4–5.3)
SODIUM SERPL-SCNC: 136 MMOL/L (ref 133–144)

## 2017-10-03 PROCEDURE — 99213 OFFICE O/P EST LOW 20 MIN: CPT | Mod: ZP | Performed by: NURSE PRACTITIONER

## 2017-10-03 PROCEDURE — 36415 COLL VENOUS BLD VENIPUNCTURE: CPT | Performed by: NURSE PRACTITIONER

## 2017-10-03 PROCEDURE — 99212 OFFICE O/P EST SF 10 MIN: CPT | Mod: 25,ZF

## 2017-10-03 PROCEDURE — 93282 PRGRMG EVAL IMPLANTABLE DFB: CPT | Mod: ZF

## 2017-10-03 PROCEDURE — 80048 BASIC METABOLIC PNL TOTAL CA: CPT | Performed by: NURSE PRACTITIONER

## 2017-10-03 ASSESSMENT — PAIN SCALES - GENERAL: PAINLEVEL: NO PAIN (0)

## 2017-10-03 NOTE — PATIENT INSTRUCTIONS
It was a pleasure to see you in clinic today. Please do not hesitate to call with any questions or concerns. You are scheduled for a remote ICD transmission on 1/4/18. This will happen automatically in the night. We will call in 1-2 business days to discuss the results with you. We look forward to seeing you in clinic at your next device check in 6 months.    Lorna Harrington RN  Electrophysiology Nurse Clinician  [facility]  During business hours call:  [phone]  After business hours please call: [phone]- select option #4 and ask for job code [id number].

## 2017-10-03 NOTE — NURSING NOTE
Chief Complaint   Patient presents with     Follow Up For     Return C[facility] appt; 62 yr old male with h/o chronic systolic HF presenting for follow up with labs prior     Vitals were taken and medications were reconciled.   Park Cannon MA    2:16 PM

## 2017-10-03 NOTE — LETTER
10/3/2017      RE: Matt Hester  [address]  [facility] [address]       Dear Colleague,    Thank you for the opportunity to participate in the care of your patient, Matt Hester, at the [facility] HEART [facility]. Please see a copy of my visit note below.     61-year-old man with a long-standing history of ischemic cardiomyopathy,presents for follow up of HFrEF. 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  Pt denies SOB, orthopnea, PND, chest pain, belly bloating, loss of appetite, LE edema. Pt states energy level is good.  He states he is currently fighting a URI, but it is improving.   He has not used any lasix since last seen.   His cardiac history is summarized as follows:  His first MI was in 1995.  In 2003, he had an anterior wall MI requiring stenting.  He had a single-chamber ICD implanted in 03/2004 for primary prevention and a generator change on 01/2010 and again in 06/2011.  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     PAST MEDICAL HISTORY:  Past Medical History   Diagnosis Date     Chronic systolic congestive heart failure (H)      Ischemic cardiomyopathy      ICD (implantable cardioverter-defibrillator) in place      HTN (hypertension)      Hyperlipidemia      Peripheral vascular disease (H)      CVA (cerebral vascular accident) (H)      Atrial fibrillation (H), paroxysmal         Other medical history includes   - colonic polyp in 11/2010   - atrophic gastritis  -  Haro esophagus   - benign neoplasm of the colon in 2011   - chronic abdominal pain  -  diverticulosis, bile duct hematoma per liver biopsy,  - colectomy 01/05/2011  - a stroke in 10/2011   - right occipital residual vision changes  -  "Hypertension   - nasal polyp   - ICD placement in 06/2011 for primary prevention.      ALLERGIES:     1.  Aggrenox, severe nausea.   2.  Alcohol, hives.   3.  Effient, rash and itching.   4.  Demerol, itching, nausea and vomiting.   5.  Flagyl.   6.  Levaquin.     SOCIAL HISTORY:  The patient works full-time in the construction business.  He does not drink, smoke or use drugs, and he never has.  [place].  He has 1 son and a 6-year-old grandson that he is very close with.       CURRENT MEDICATIONS:  Current Outpatient Prescriptions   Medication Sig Dispense Refill     enalapril (VASOTEC) 5 MG tablet Take 1 tablet (5 mg) by mouth 2 times daily 30 tablet 1     Clopidogrel Bisulfate (PLAVIX PO) Take 75 mg by mouth daily       SPIRONOLACTONE PO Take 12.5 mg by mouth daily       Rosuvastatin Calcium (CRESTOR PO) Take 40 mg by mouth daily       Magic Mouthwash (FV std formula) lidocaine visc 2% 2.5mL/5mL & maalox/mylanta w/ simeth 2.5mL/5mL & diphenhydrAMINE 5mg/5mL Swish and swallow 10 mLs in mouth every 6 hours as needed for mouth sores       Carvedilol (COREG PO) Take 12.5 mg by mouth       Furosemide (LASIX PO) Take 20 mg by mouth         ROS:   Constitutional: No fever, chills, or sweats. No weight gain/loss.   ENT: No visual disturbance, ear ache, epistaxis, sore throat.   Allergies/Immunologic: Negative.   Respiratory:has a current dry cough, hemoptysis.   Cardiovascular: As per HPI.   GI: No nausea, vomiting, hematemesis, melena, or hematochezia.  : No urinary frequency, dysuria, or hematuria.   Integument: Negative.   Psychiatric: Negative.   Neuro: Negative.   Endocrinology: Negative.   Musculoskeletal: Negative.    EXAM:  /78 (BP Location: Left arm, Patient Position: Chair, Cuff Size: Adult Regular)  Pulse 78  Ht 1.702 m (5' 7\")  Wt 76.4 kg (168 lb 6.4 oz)  SpO2 95%  BMI 26.38 kg/m2  General: appears comfortable, alert and articulate  Head: normocephalic, " atraumatic  Eyes: anicteric sclera, EOMI  Neck: no adenopathy  Orophyarynx: moist mucosa, no lesions, dentition intact  Heart: regular, S1/S2, no murmur, gallop, rub, estimated JVP  <10   Lungs: clear, no rales or wheezing  Abdomen: soft, non-tender, bowel sounds present, no hepatosplenomegaly  Extremities: no clubbing, cyanosis or edema  Neurological: normal speech and affect, no gross motor deficits    Labs:  CBC RESULTS:  Lab Results   Component Value Date    WBC 7.1 07/21/2016    RBC 5.37 07/21/2016    HGB 15.0 07/21/2016    HCT 46.5 07/21/2016    MCV 87 07/21/2016    MCH 27.9 07/21/2016    MCHC 32.3 07/21/2016    RDW 14.0 07/21/2016     07/21/2016       CMP RESULTS:  Lab Results   Component Value Date     10/03/2017    POTASSIUM 3.7 10/03/2017    CHLORIDE 100 10/03/2017    CO2 32 10/03/2017    ANIONGAP 4 10/03/2017    GLC 94 10/03/2017    BUN 8 10/03/2017    CR 0.92 10/03/2017    GFRESTIMATED 83 10/03/2017    GFRESTBLACK >90 10/03/2017    JD 8.2 (L) 10/03/2017    BILITOTAL 0.6 07/21/2016    ALBUMIN 3.4 07/21/2016    ALKPHOS 59 07/21/2016    ALT 17 07/21/2016    AST 13 07/21/2016        Assessment and Plan:   1. Chronic systolic heart failure due to ICM  Stage B  NYHA Class 1-2  On Beta blocker, Ace inhibitor, aldosterone blocker,   Has ICD  Volume is euvolemic    2. Hx of CVA - on Plavix    No medication changes made. Pt continues to feel well and is very physically active without sx.   He will follow up with Dr. Maynard in 3 months/CORE clinic in 6 months.    NEEMA Turk CNP    CC  Patient Care Team:  Maykel Barber MD as PCP - General (Internal Medicine)  Jael Roblero RN as Nurse Coordinator (Cardiology)  Caesar, Kimberly Ng MD as MD (Cardiology)  Savana Renae RN as Nurse Coordinator (Cardiology)  Wick, Edel Evelyn, APRN CNP as Nurse Practitioner (Nurse Practitioner - Gerontology)  EDEL VALDIVIA

## 2017-10-03 NOTE — PROGRESS NOTES
61-year-old man with a long-standing history of ischemic cardiomyopathy,presents for follow up of HFrEF. 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  Pt denies SOB, orthopnea, PND, chest pain, belly bloating, loss of appetite, LE edema. Pt states energy level is good.  He states he is currently fighting a URI, but it is improving.   He has not used any lasix since last seen.   His cardiac history is summarized as follows:  His first MI was in 1995.  In 2003, he had an anterior wall MI requiring stenting.  He had a single-chamber ICD implanted in 03/2004 for primary prevention and a generator change on 01/2010 and again in 06/2011.  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     PAST MEDICAL HISTORY:  Past Medical History   Diagnosis Date     Chronic systolic congestive heart failure (H)      Ischemic cardiomyopathy      ICD (implantable cardioverter-defibrillator) in place      HTN (hypertension)      Hyperlipidemia      Peripheral vascular disease (H)      CVA (cerebral vascular accident) (H)      Atrial fibrillation (H), paroxysmal         Other medical history includes   - colonic polyp in 11/2010   - atrophic gastritis  -  Haro esophagus   - benign neoplasm of the colon in 2011   - chronic abdominal pain  -  diverticulosis, bile duct hematoma per liver biopsy,  - colectomy 01/05/2011  - a stroke in 10/2011   - right occipital residual vision changes  - Hypertension   - nasal polyp   - ICD placement in 06/2011 for primary prevention.      ALLERGIES:     1.  Aggrenox, severe nausea.   2.  Alcohol, hives.   3.  Effient, rash and itching.   4.  Demerol, itching, nausea and vomiting.   5.  Flagyl.   6.  Levaquin.     SOCIAL HISTORY:  The patient works full-time in  "the [employer] business.  He does not drink, smoke or use drugs, and he never has.  [place] minutes north of [place].  He has 1 son and a 6-year-old grandson that he is very close with.       CURRENT MEDICATIONS:  Current Outpatient Prescriptions   Medication Sig Dispense Refill     enalapril (VASOTEC) 5 MG tablet Take 1 tablet (5 mg) by mouth 2 times daily 30 tablet 1     Clopidogrel Bisulfate (PLAVIX PO) Take 75 mg by mouth daily       SPIRONOLACTONE PO Take 12.5 mg by mouth daily       Rosuvastatin Calcium (CRESTOR PO) Take 40 mg by mouth daily       Magic Mouthwash (FV std formula) lidocaine visc 2% 2.5mL/5mL & maalox/mylanta w/ simeth 2.5mL/5mL & diphenhydrAMINE 5mg/5mL Swish and swallow 10 mLs in mouth every 6 hours as needed for mouth sores       Carvedilol (COREG PO) Take 12.5 mg by mouth       Furosemide (LASIX PO) Take 20 mg by mouth         ROS:   Constitutional: No fever, chills, or sweats. No weight gain/loss.   ENT: No visual disturbance, ear ache, epistaxis, sore throat.   Allergies/Immunologic: Negative.   Respiratory:has a current dry cough, hemoptysis.   Cardiovascular: As per HPI.   GI: No nausea, vomiting, hematemesis, melena, or hematochezia.  : No urinary frequency, dysuria, or hematuria.   Integument: Negative.   Psychiatric: Negative.   Neuro: Negative.   Endocrinology: Negative.   Musculoskeletal: Negative.    EXAM:  /78 (BP Location: Left arm, Patient Position: Chair, Cuff Size: Adult Regular)  Pulse 78  Ht 1.702 m (5' 7\")  Wt 76.4 kg (168 lb 6.4 oz)  SpO2 95%  BMI 26.38 kg/m2  General: appears comfortable, alert and articulate  Head: normocephalic, atraumatic  Eyes: anicteric sclera, EOMI  Neck: no adenopathy  Orophyarynx: moist mucosa, no lesions, dentition intact  Heart: regular, S1/S2, no murmur, gallop, rub, estimated JVP  <10   Lungs: clear, no rales or wheezing  Abdomen: soft, non-tender, bowel sounds present, no hepatosplenomegaly  Extremities: no clubbing, " cyanosis or edema  Neurological: normal speech and affect, no gross motor deficits    Labs:  CBC RESULTS:  Lab Results   Component Value Date    WBC 7.1 07/21/2016    RBC 5.37 07/21/2016    HGB 15.0 07/21/2016    HCT 46.5 07/21/2016    MCV 87 07/21/2016    MCH 27.9 07/21/2016    MCHC 32.3 07/21/2016    RDW 14.0 07/21/2016     07/21/2016       CMP RESULTS:  Lab Results   Component Value Date     10/03/2017    POTASSIUM 3.7 10/03/2017    CHLORIDE 100 10/03/2017    CO2 32 10/03/2017    ANIONGAP 4 10/03/2017    GLC 94 10/03/2017    BUN 8 10/03/2017    CR 0.92 10/03/2017    GFRESTIMATED 83 10/03/2017    GFRESTBLACK >90 10/03/2017    JD 8.2 (L) 10/03/2017    BILITOTAL 0.6 07/21/2016    ALBUMIN 3.4 07/21/2016    ALKPHOS 59 07/21/2016    ALT 17 07/21/2016    AST 13 07/21/2016        Assessment and Plan:   1. Chronic systolic heart failure due to ICM  Stage B  NYHA Class 1-2  On Beta blocker, Ace inhibitor, aldosterone blocker,   Has ICD  Volume is euvolemic    2. Hx of CVA - on Plavix    No medication changes made. Pt continues to feel well and is very physically active without sx.   He will follow up with Dr. Maynard in 3 months/CORE clinic in 6 months.    CC  Patient Care Team:  Maykel Barber MD as PCP - General (Internal Medicine)  Jael Roblero, RN as Nurse Coordinator (Cardiology)  Kimberly Maynard MD as MD (Cardiology)  Savana Renae, RN as Nurse Coordinator (Cardiology)  Edel Hernandez APRN CNP as Nurse Practitioner (Nurse Practitioner - Gerontology)  EDEL HERNANDEZ

## 2017-10-03 NOTE — PATIENT INSTRUCTIONS
"You were seen today in the Cardiovascular Clinic at the [facility].     Cardiology Providers you saw during your visit: Jeanie Hernandez       1.Please make a follow-up CORE/heart failure appt with  in 6 months with labs prior.   2. Please make a follow up with Dr. Maynard in 3 months.   3. Labs faxed to [facility] at [phone]. Orders in.     Results for VIJAY MCKEE (MRN [id number]) as of 10/3/2017 14:59   Ref. Range 10/3/2017 13:22   Sodium Latest Ref Range: 133 - 144 mmol/L 136   Potassium Latest Ref Range: 3.4 - 5.3 mmol/L 3.7   Chloride Latest Ref Range: 94 - 109 mmol/L 100   Carbon Dioxide Latest Ref Range: 20 - 32 mmol/L 32   Urea Nitrogen Latest Ref Range: 7 - 30 mg/dL 8   Creatinine Latest Ref Range: 0.66 - 1.25 mg/dL 0.92   GFR Estimate Latest Ref Range: >60 mL/min/1.7m2 83   GFR Estimate If Black Latest Ref Range: >60 mL/min/1.7m2 >90   Calcium Latest Ref Range: 8.5 - 10.1 mg/dL 8.2 (L)   Anion Gap Latest Ref Range: 3 - 14 mmol/L 4   Glucose Latest Ref Range: 70 - 99 mg/dL 94           Please limit your fluid intake to 2 L (64 ounces) daily.  2 Liters a day = 8.5 cups, or 72 ounces.  Please limit your salt intake to 2 grams a day or less.    If you gain 2# in 24 hours or 5# in one week call Juana Gonzalez RN so we can adjust your medications as needed over the phone.    Please feel free to call me with any questions or concerns.      Juana Gonzalez RN BSN   [facility]  Cardiology Care Coordinator-Heart Failure Clinic    Questions and [phone].   First press #1 for the Open Utility and then press #3 for \"Medical Questions\" to reach us Cardiology Nurses.     On Call Cardiologist for after hours or on weekends: [phone]   option #4 and ask to speak to the on-call Cardiologist. Inform them you are a CORE/heart failure patient at the [facility].        If you need a medication refill please contact your pharmacy.  Please allow 3 business days for " your refill to be completed.  _______________________________________________________  C.O.R.E. CLINIC Cardiomyopathy, Optimization, Rehabilitation, Education   The C.O.R.E. CLINIC is a heart failure specialty clinic within the [facility] where you will work with specialized nurse practitioners dedicated to helping patients with heart failure carefully adjust medications, receive education, and learn who and when to call if symptoms develop. They specialize in helping you better understand your condition, slow the progression of your disease, improve the length and quality of your life, help you detect future heart problems before they become life threatening, and avoid hospitalizations.  As always, thank you for trusting us with your health care needs!

## 2017-10-03 NOTE — MR AVS SNAPSHOT
After Visit Summary   10/3/2017    Matt Hester    MRN: [id number]           Patient Information     Date Of Birth          1955        Visit Information        Provider Department      10/3/2017 3:00 PM 1, Uc Cv Device [facility]        Today's Diagnoses     Ischemic cardiomyopathy    -  1      Care Instructions    It was a pleasure to see you in clinic today. Please do not hesitate to call with any questions or concerns. You are scheduled for a remote ICD transmission on 1/4/18. This will happen automatically in the night. We will call in 1-2 business days to discuss the results with you. We look forward to seeing you in clinic at your next device check in 6 months.    Lorna Harrington, RN  Electrophysiology Nurse Clinician  [facility]  During business hours call:  [phone]  After business hours please call: [phone]- select option #4 and ask for job code [id number].            Follow-ups after your visit        Follow-up notes from your care team     Return in about 6 months (around 4/3/2018) for [place], ICD check.      Your next 10 appointments already scheduled     Oct 03, 2017  3:00 PM CDT   (Arrive by 2:45 PM)   Implanted Defibulator with Uc Cv Device 1   [facility] ([facility])    [address] [place] [id number] [phone]            Oct 03, 2017  3:30 PM CDT   (Arrive by 3:15 PM)   CORE RETURN with NEEMA Turk [facility] ([facility])    [address] [place] [id number] [phone]              Who to contact     If you have questions or need follow up information about today's clinic visit or your schedule please contact [facility] directly at [phone].  Normal or non-critical lab and imaging results will be communicated to you by MyChart, letter or phone within 4 business days after the  clinic has received the results. If you do not hear from us within 7 days, please contact the clinic through HeartThis or phone. If you have a critical or abnormal lab result, we will notify you by phone as soon as possible.  Submit refill requests through HeartThis or call your pharmacy and they will forward the refill request to us. Please allow 3 business days for your refill to be completed.          Additional Information About Your Visit        [facility] [employer] Medical Information     HeartThis gives you secure access to your electronic health record. If you see a primary care provider, you can also send messages to your care team and make appointments. If you have questions, please call your primary care clinic.  If you do not have a primary care provider, please call [phone] and they will assist you.        Care EveryWhere ID     This is your Care EveryWhere ID. This could be used by other organizations to access your [facility] medical records  [id number]         Blood Pressure from Last 3 Encounters:   07/25/17 124/80   04/25/17 123/84   01/10/17 116/63    Weight from Last 3 Encounters:   07/25/17 77.8 kg (171 lb 9.6 oz)   04/25/17 76.7 kg (169 lb)   01/10/17 80.2 kg (176 lb 14.4 oz)              We Performed the Following     ICD DEVICE PROGRAMMING EVAL, SINGLE LEAD ICD        Primary Care Provider Office Phone # Fax #    Maykel Barber [phone]       [facility] [address] [place] [address]        Equal Access to Services     [facility] HospitalSANDRA : Hadii aad ku hadasho Soomaali, waaxda luqadaha, qaybta kaalmada adeegyada, waxay robby mcfadden . So [facility] [phone].    ATENCIÓN: Si habla eliz, tiene a castaneda disposición servicios gratuitos de asistencia lingüística. Llame al [phone].    We comply with applicable federal civil rights laws and [place] laws. We do not discriminate on the basis of race, color, national origin, age, disability, sex, sexual orientation, or  gender identity.            Thank you!     Thank you for choosing [facility]  for your care. Our goal is always to provide you with excellent care. Hearing back from our patients is one way we can continue to improve our services. Please take a few minutes to complete the written survey that you may receive in the mail after your visit with us. Thank you!             Your Updated Medication List - Protect others around you: Learn how to safely use, store and throw away your medicines at www.disposemymeds.org.          This list is accurate as of: 10/3/17  2:51 PM.  Always use your most recent med list.                   Brand Name Dispense Instructions for use Diagnosis    COREG PO      Take 12.5 mg by mouth        CRESTOR PO      Take 40 mg by mouth daily        enalapril 5 MG tablet    VASOTEC    30 tablet    Take 1 tablet (5 mg) by mouth 2 times daily    Ischemic cardiomyopathy       LASIX PO      Take 20 mg by mouth        lidocaine visc 2% 2.5mL/5mL & maalox/mylanta w/ simeth 2.5mL/5mL & diphenhydrAMINE 5mg/5mL Susp suspension    [facility] Mouthwash [facility]     Swish and swallow 10 mLs in mouth every 6 hours as needed for mouth sores        PLAVIX PO      Take 75 mg by mouth daily        SPIRONOLACTONE PO      Take 12.5 mg by mouth daily

## 2017-10-03 NOTE — MR AVS SNAPSHOT
"              After Visit Summary   10/3/2017    Matt Mckee    MRN: [id number]           Patient Information     Date Of Birth          1955        Visit Information        Provider Department      10/3/2017 3:30 PM Jeanie Hernandez APRN [facility]        Today's Diagnoses     Hypocalcemia    -  1      Care Instructions    You were seen today in the Cardiovascular Clinic at the [facility].     Cardiology Providers you saw during your visit: Jeanie Hernandez       1.Please make a follow-up CORE/heart failure appt with  in ___ with labs prior.     Results for MATT MCKEE (MRN [id number]) as of 10/3/2017 14:59   Ref. Range 10/3/2017 13:22   Sodium Latest Ref Range: 133 - 144 mmol/L 136   Potassium Latest Ref Range: 3.4 - 5.3 mmol/L 3.7   Chloride Latest Ref Range: 94 - 109 mmol/L 100   Carbon Dioxide Latest Ref Range: 20 - 32 mmol/L 32   Urea Nitrogen Latest Ref Range: 7 - 30 mg/dL 8   Creatinine Latest Ref Range: 0.66 - 1.25 mg/dL 0.92   GFR Estimate Latest Ref Range: >60 mL/min/1.7m2 83   GFR Estimate If Black Latest Ref Range: >60 mL/min/1.7m2 >90   Calcium Latest Ref Range: 8.5 - 10.1 mg/dL 8.2 (L)   Anion Gap Latest Ref Range: 3 - 14 mmol/L 4   Glucose Latest Ref Range: 70 - 99 mg/dL 94           Please limit your fluid intake to 2 L (64 ounces) daily.  2 Liters a day = 8.5 cups, or 72 ounces.  Please limit your salt intake to 2 grams a day or less.    If you gain 2# in 24 hours or 5# in one week call Juana Gonzalez RN so we can adjust your medications as needed over the phone.    Please feel free to call me with any questions or concerns.      Juana Gonzalez RN BSN   [facility]  Cardiology Care Coordinator-Heart Failure Clinic    Questions and [phone].   First press #1 for the University and then press #3 for \"Medical Questions\" to reach us Cardiology Nurses.     On Call Cardiologist for after hours or on weekends: [phone]   option " #4 and ask to speak to the on-call Cardiologist. Inform them you are a CORE/heart failure patient at the [place].        If you need a medication refill please contact your pharmacy.  Please allow 3 business days for your refill to be completed.  _______________________________________________________  C.O.R.E. CLINIC Cardiomyopathy, Optimization, Rehabilitation, Education   The C.O.R.E. CLINIC is a heart failure specialty clinic within the [facility] where you will work with specialized nurse practitioners dedicated to helping patients with heart failure carefully adjust medications, receive education, and learn who and when to call if symptoms develop. They specialize in helping you better understand your condition, slow the progression of your disease, improve the length and quality of your life, help you detect future heart problems before they become life threatening, and avoid hospitalizations.  As always, thank you for trusting us with your health care needs!                Follow-ups after your visit        Follow-up notes from your care team     Return in about 3 months (around 1/3/2018).      Your next 10 appointments already scheduled     Jan 04, 2018  7:45 AM CST   Lab with [facility]   [facility])    [address] [place] [address]   [phone]            Jan 04, 2018  8:00 AM CST   (Arrive by 7:45 AM)   RETURN HEART FAILURE with Kimberly Maynard MD   [facility])    [address] [place] [address]   [phone]            Apr 03, 2018  2:30 PM CDT   Lab with [facility]   [facility] ([facility])    [address] [place] [address] [phone]            Apr 03, 2018  3:00 PM CDT   (Arrive by 2:45 PM)   Implanted Defibulator with [employer] Cv Device 1   [facility] (  "[facility])    [address]  [address] [facility] [address] [phone]            Apr 03, 2018  3:30 PM CDT   (Arrive by 3:15 PM)   CORE RETURN with NEEMA Turk CNP   [facility] ([facility])    [address] [id number] [phone]              Future tests that were ordered for you today     Open Future Orders        Priority Expected Expires Ordered    Calcium Routine 11/7/2017 11/14/2017 10/3/2017            Who to contact     If you have questions or need follow up information about today's clinic visit or your schedule please contact [facility] directly at [phone].  Normal or non-critical lab and imaging results will be communicated to you by Reading Rainbowhart, letter or phone within 4 business days after the clinic has received the results. If you do not hear from us within 7 days, please contact the clinic through Reading Rainbowhart or phone. If you have a critical or abnormal lab result, we will notify you by phone as soon as possible.  Submit refill requests through Reunify or call your pharmacy and they will forward the refill request to us. Please allow 3 business days for your refill to be completed.          Additional Information About Your Visit        LOOKKt Information     Reunify gives you secure access to your electronic health record. If you see a primary care provider, you can also send messages to your care team and make appointments. If you have questions, please call your primary care clinic.  If you do not have a primary care provider, please call [phone] and they will assist you.        Care EveryWhere ID     This is your Care EveryWhere ID. This could be used by other organizations to access your [place] [facility] medical records  [id number]        Your Vitals Were     Pulse Height Pulse Oximetry BMI (Body Mass Index)          78 1.702 m (5' 7\") 95% 26.38 kg/m2         Blood " Pressure from Last 3 Encounters:   10/03/17 114/78   07/25/17 124/80   04/25/17 123/84    Weight from Last 3 Encounters:   10/03/17 76.4 kg (168 lb 6.4 oz)   07/25/17 77.8 kg (171 lb 9.6 oz)   04/25/17 76.7 kg (169 lb)               Primary Care Provider Office Phone # Fax #    Maykel Barber [phone]       [facility] [address] [facility] [address]        Equal Access to Services     [facility]: Hadii bon thomas hadashlupe Socindy, waaxda luqadaha, qaybta kaalmada aura, jeanie mcfadden . So [facility] [phone].    ATENCIÓN: Si habla español, tiene a castaneda disposición servicios gratuitos de asistencia lingüística. [facility] [phone].    We comply with applicable federal civil rights laws and [place] laws. We do not discriminate on the basis of race, color, national origin, age, disability, sex, sexual orientation, or gender identity.            Thank you!     Thank you for choosing [facility]  for your care. Our goal is always to provide you with excellent care. Hearing back from our patients is one way we can continue to improve our services. Please take a few minutes to complete the written survey that you may receive in the mail after your visit with us. Thank you!             Your Updated Medication List - Protect others around you: Learn how to safely use, store and throw away your medicines at www.disposemymeds.org.          This list is accurate as of: 10/3/17  3:52 PM.  Always use your most recent med list.                   Brand Name Dispense Instructions for use Diagnosis    COREG PO      Take 12.5 mg by mouth        CRESTOR PO      Take 40 mg by mouth daily        enalapril 5 MG tablet    VASOTEC    30 tablet    Take 1 tablet (5 mg) by mouth 2 times daily    Ischemic cardiomyopathy       LASIX PO      Take 20 mg by mouth        lidocaine visc 2% 2.5mL/5mL & maalox/mylanta w/ simeth 2.5mL/5mL & diphenhydrAMINE 5mg/5mL Susp suspension     [employer] Mouthwash [facility]     Swish and swallow 10 mLs in mouth every 6 hours as needed for mouth sores        PLAVIX PO      Take 75 mg by mouth daily        SPIRONOLACTONE PO      Take 12.5 mg by mouth daily

## 2017-10-03 NOTE — PROGRESS NOTES
Pt seen in the [facility] for evaluation and iterative programming of a [employer], single lead ICD, per MD orders. He also has an appointment with Jeanie Hernandez NP. Today his intrinsic rhythm is a regular ventricular rhythm @ 78 bpm. Normal ICD function. No arrhythmias recorded.  <1%. Lead trends appear stable. Pt reports that he is feeling well. Battery estimates 6.5 years to CHARISSA. Plan for pt to send a remote transmission on 1/4/18 and RTC in 6 months.  Single lead ICD

## 2017-10-03 NOTE — NURSING NOTE
Chief Complaint   Patient presents with     Follow Up For     Return CORE appt; 62 yr old male with h/o chronic systolic HF presenting for follow up with labs prior     Vitals were taken and medications were reconciled.     Park Cannon MA    2:32 PM

## 2017-10-03 NOTE — NURSING NOTE
Chief Complaint   Patient presents with     Follow Up For     Return CORE appt; 62 yr old male with h/o chronic systolic HF presenting for follow up with labs prior     Vitals were taken and medications were reconciled.     Kirstie Rayo MA  2:24 PM

## 2017-10-17 ENCOUNTER — TRANSFERRED RECORDS (OUTPATIENT)
Dept: HEALTH INFORMATION MANAGEMENT | Facility: CLINIC | Age: 62
End: 2017-10-17

## 2017-10-17 LAB — CALCIUM SERPL-MCNC: 9.2 MG/DL

## 2017-10-25 ENCOUNTER — CARE COORDINATION (OUTPATIENT)
Dept: CARDIOLOGY | Facility: CLINIC | Age: 62
End: 2017-10-25

## 2017-10-25 DIAGNOSIS — E83.51 HYPOCALCEMIA: ICD-10-CM

## 2017-11-13 ENCOUNTER — TRANSFERRED RECORDS (OUTPATIENT)
Dept: HEALTH INFORMATION MANAGEMENT | Facility: CLINIC | Age: 62
End: 2017-11-13

## 2018-01-02 ENCOUNTER — PRE VISIT (OUTPATIENT)
Dept: CARDIOLOGY | Facility: CLINIC | Age: 63
End: 2018-01-02

## 2018-01-02 DIAGNOSIS — I50.22 CHRONIC SYSTOLIC CONGESTIVE HEART FAILURE (H): Primary | ICD-10-CM

## 2018-01-04 ENCOUNTER — OFFICE VISIT (OUTPATIENT)
Dept: CARDIOLOGY | Facility: CLINIC | Age: 63
End: 2018-01-04
Attending: INTERNAL MEDICINE
Payer: MEDICARE

## 2018-01-04 VITALS
HEIGHT: 67 IN | SYSTOLIC BLOOD PRESSURE: 126 MMHG | HEART RATE: 75 BPM | OXYGEN SATURATION: 96 % | BODY MASS INDEX: 25.8 KG/M2 | WEIGHT: 164.4 LBS | DIASTOLIC BLOOD PRESSURE: 87 MMHG

## 2018-01-04 DIAGNOSIS — I25.5 ISCHEMIC CARDIOMYOPATHY: Primary | ICD-10-CM

## 2018-01-04 DIAGNOSIS — I50.22 CHRONIC SYSTOLIC CONGESTIVE HEART FAILURE (H): Primary | ICD-10-CM

## 2018-01-04 DIAGNOSIS — I50.22 CHRONIC SYSTOLIC CONGESTIVE HEART FAILURE (H): ICD-10-CM

## 2018-01-04 LAB
ANION GAP SERPL CALCULATED.3IONS-SCNC: 6 MMOL/L (ref 3–14)
BUN SERPL-MCNC: 6 MG/DL (ref 7–30)
CALCIUM SERPL-MCNC: 8.9 MG/DL (ref 8.5–10.1)
CHLORIDE SERPL-SCNC: 104 MMOL/L (ref 94–109)
CO2 SERPL-SCNC: 30 MMOL/L (ref 20–32)
CREAT SERPL-MCNC: 0.89 MG/DL (ref 0.66–1.25)
GFR SERPL CREATININE-BSD FRML MDRD: 86 ML/MIN/1.7M2
GLUCOSE SERPL-MCNC: 94 MG/DL (ref 70–99)
POTASSIUM SERPL-SCNC: 4.2 MMOL/L (ref 3.4–5.3)
SODIUM SERPL-SCNC: 140 MMOL/L (ref 133–144)

## 2018-01-04 PROCEDURE — G0463 HOSPITAL OUTPT CLINIC VISIT: HCPCS | Mod: ZF

## 2018-01-04 PROCEDURE — 93289 INTERROG DEVICE EVAL HEART: CPT | Mod: 26 | Performed by: INTERNAL MEDICINE

## 2018-01-04 PROCEDURE — 36415 COLL VENOUS BLD VENIPUNCTURE: CPT | Performed by: INTERNAL MEDICINE

## 2018-01-04 PROCEDURE — 80048 BASIC METABOLIC PNL TOTAL CA: CPT | Performed by: INTERNAL MEDICINE

## 2018-01-04 PROCEDURE — 93282 PRGRMG EVAL IMPLANTABLE DFB: CPT | Mod: ZF

## 2018-01-04 PROCEDURE — 99214 OFFICE O/P EST MOD 30 MIN: CPT | Mod: GC | Performed by: INTERNAL MEDICINE

## 2018-01-04 RX ORDER — CARVEDILOL 12.5 MG/1
18.75 TABLET ORAL 2 TIMES DAILY WITH MEALS
Qty: 270 TABLET | Refills: 3 | Status: SHIPPED | OUTPATIENT
Start: 2018-01-04 | End: 2019-02-23

## 2018-01-04 ASSESSMENT — PAIN SCALES - GENERAL: PAINLEVEL: NO PAIN (0)

## 2018-01-04 NOTE — NURSING NOTE
Chief Complaint   Patient presents with     Follow Up For     3 mo. follow up for HF/ labs/device     Vitals were taken and medications were reconciled.     Patricia Watson,BRIANA  7:36 AM

## 2018-01-04 NOTE — PATIENT INSTRUCTIONS
You were seen today in the Cardiovascular Clinic at the [facility].     Cardiology Providers you saw during your visit:  Dr. Maynard    Medication Changes:  1- Please DECREASE Carvedilol (Coreg) to 18.75 mg by mouth twice daily.    Follow up:    1- Follow up with Jeanie Hernandez as scheduled on April 3rd with ECHO and blood work ( BMP and NT pro BNP)    2- Follow up with Dr. Maynard in 1 year    Please call if you have any questions or concerns.    Annmarie Ruelas RN  Cardiology Nurse Care Coordinator  [phone], press option # 1 to be routed to the [place] then option # 3 for medical questions to speak with a nurse    Keep up the good work!    Take care!

## 2018-01-04 NOTE — MR AVS SNAPSHOT
After Visit Summary   1/4/2018    Matt Hester    MRN: [id number]           Patient Information     Date Of Birth          1955        Visit Information        Provider Department      1/4/2018 8:30 AM 1, Uc Cv Device [facility]        Today's Diagnoses     Ischemic cardiomyopathy    -  1       Follow-ups after your visit        Your next 10 appointments already scheduled     Apr 03, 2018  1:30 PM CDT   Lab with  LAB   [facility] ([facility])    [address]  1st Floor  [facility] [address]   [phone]            Apr 03, 2018  2:00 PM CDT   Ech Complete with UCECHCR4   [facility] ([facility])    [address]  3rd Floor  [facility] [id number] [phone]           1. Please bring or wear a comfortable two-piece outfit. 2. You may eat, drink and take your normal medicines. 3. For any questions that cannot be answered, please contact the ordering physician            Apr 03, 2018  3:00 PM CDT   (Arrive by 2:45 PM)   Implanted Defibulator with Uc Cv Device 1   [facility] ([facility])    [address]  [address] [place] [address] [phone]            Apr 03, 2018  3:30 PM CDT   (Arrive by 3:15 PM)   CORE RETURN with NEEMA Turk [facility] ([facility])    [address] [place] [address]  [address] [place] [address] [phone]              Future tests that were ordered for you today     Open Future Orders        Priority Expected Expires Ordered    Follow-Up with Cardiologist Routine 12/4/2018 1/4/2019 1/4/2018    N terminal pro BNP outpatient Routine 4/3/2018 5/8/2018 1/4/2018    Echocardiogram Routine 4/3/2018 4/11/2018 1/4/2018    Basic metabolic panel Routine 12/4/2018 1/4/2019 1/4/2018            Who to contact     If you have questions or need follow up information about today's clinic visit  or your schedule please contact [facility] directly at [phone].  Normal or non-critical lab and imaging results will be communicated to you by MyChart, letter or phone within 4 business days after the clinic has received the results. If you do not hear from us within 7 days, please contact the clinic through Interact Public Safetyhart or phone. If you have a critical or abnormal lab result, we will notify you by phone as soon as possible.  Submit refill requests through Agavideo or call your pharmacy and they will forward the refill request to us. Please allow 3 business days for your refill to be completed.          Additional Information About Your Visit        Agavideo Information     Agavideo gives you secure access to your electronic health record. If you see a primary care provider, you can also send messages to your care team and make appointments. If you have questions, please call your primary care clinic.  If you do not have a primary care provider, please call [phone] and they will assist you.        Care EveryWhere ID     This is your Care EveryWhere ID. This could be used by other organizations to access your [place] medical records  [id number]         Blood Pressure from Last 3 Encounters:   01/04/18 126/87   10/03/17 114/78   07/25/17 124/80    Weight from Last 3 Encounters:   01/04/18 74.6 kg (164 lb 6.4 oz)   10/03/17 76.4 kg (168 lb 6.4 oz)   07/25/17 77.8 kg (171 lb 9.6 oz)              We Performed the Following     (20616) ICD DEVICE PROGRAMMING EVAL, SINGLE LEAD ICD          Today's Medication Changes          These changes are accurate as of: 1/4/18  9:19 AM.  If you have any questions, ask your nurse or doctor.               These medicines have changed or have updated prescriptions.        Dose/Directions    carvedilol 12.5 MG tablet   Commonly known as:  COREG   This may have changed:    - medication strength  - how much to take  - when to take this   Used for:  Chronic systolic congestive  heart failure (H)   Changed by:  Kimberly Maynard MD        Dose:  18.75 mg   Take 1.5 tablets (18.75 mg) by mouth 2 times daily (with meals)   Quantity:  270 tablet   Refills:  3            Where to get your medicines      These medications were sent to [facility] - [place], [place] - [address]  [address], [facility] [address]     Phone:  [phone]     carvedilol 12.5 MG tablet                Primary Care Provider Office Phone # Fax #    Maykel Barber [phone]       [facility] [address] [facility] [address]        Equal Access to Services     MARILYN ALICEA : Hadii bon thomas hadasho Socindy, waaxda luqadaha, qaybta kaalmada aderickyada, jeanie mcfadden . So [facility] [phone].    ATENCIÓN: Si habla español, tiene a castaneda disposición servicios gratuitos de asistencia lingüística. Llame al [phone].    We comply with applicable federal civil rights laws and [place] laws. We do not discriminate on the basis of race, color, national origin, age, disability, sex, sexual orientation, or gender identity.            Thank you!     Thank you for choosing [facility]  for your care. Our goal is always to provide you with excellent care. Hearing back from our patients is one way we can continue to improve our services. Please take a few minutes to complete the written survey that you may receive in the mail after your visit with us. Thank you!             Your Updated Medication List - Protect others around you: Learn how to safely use, store and throw away your medicines at www.disposemymeds.org.          This list is accurate as of: 1/4/18  9:19 AM.  Always use your most recent med list.                   Brand Name Dispense Instructions for use Diagnosis    carvedilol 12.5 MG tablet    COREG    270 tablet    Take 1.5 tablets (18.75 mg) by mouth 2 times daily (with meals)    Chronic systolic congestive heart failure (H)        CRESTOR PO      Take 40 mg by mouth daily        enalapril 5 MG tablet    VASOTEC    30 tablet    Take 1 tablet (5 mg) by mouth 2 times daily    Ischemic cardiomyopathy       LASIX PO      Take 20 mg by mouth        lidocaine visc 2% 2.5mL/5mL & maalox/mylanta w/ simeth 2.5mL/5mL & diphenhydrAMINE 5mg/5mL Susp suspension    [facility] Mouthwash [facility]     Swish and swallow 10 mLs in mouth every 6 hours as needed for mouth sores        PLAVIX PO      Take 75 mg by mouth daily        SPIRONOLACTONE PO      Take 12.5 mg by mouth daily

## 2018-01-04 NOTE — PROGRESS NOTES
Pt seen in the [facility] for evaluation and iterative programming of a [employer], single lead ICD, per MD orders. He also has an appointment with Dr. Maynard. Today his intrinsic rhythm is a regular ventricular rhythm @ 72 bpm. Normal ICD function. 2 NSVT episodes recorded lasting 4-6 beats. = 0%. Lead trends appear stable. Pt reports that he is feeling well. Battery estimates 6 years to CHARISSA. Plan for pt to RTC in 3 months.  Single lead ICD

## 2018-01-04 NOTE — LETTER
"2018      RE: Matt Hester  [address]  [facility] [address]       Dear Colleague,    Thank you for the opportunity to participate in the care of your patient, Matt Hester, at the [facility] HEART CARE at [facility]. Please see a copy of my visit note below.    [facility]  2018    RE: Matt Hester   MRN: [id number]   : 1955      Dear Colleagues:        We had the pleasure of seeing Mr. Matt Hester in the [facility] Advanced Heart Failure Clinic on 2018.  As you know, he is a very pleasant, 62-year-old man with a long-standing history of ischemic cardiomyopathy, and I will detail his cardiac history below.  He has been followed in [place] and was previously seeing Jun Velazquez as well as one of our nurse practitioners, who has now moved here, Jeanie Hernandez.  When he was first diagnosed, his ejection fraction was in the 10%-15% range.  He has now been stable in the 25% range with \"an excellent quality of life\" for many years.      His cardiac history is summarized as follows:  His first MI was in .  In , he had an anterior wall MI requiring stenting.  He had a single-chamber ICD implanted in 2004 for primary prevention and a generator change on 2010 and again in 2011.  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  He has regained function of his left arm but his visual deficit persists.    Today he continues to feel quite well. In early December he had an episode of diverticulitis with diarrhea requiring antibiotics. No issues with fluid retention at that time. Has lost about 10 lbs in the past 1 year due to eating better (less calories) and getting a little more exercise. 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 Lost vision in his left eye at the time of his stroke. Rarely uses Lasix 20mg PRN (less than once per month, which he only uses for dyspnea). No orthopnea, PND, chest pain, palpitations, syncope, ICD shocks. He plans for a remote ICD transmission today. He had to reduce the dose of Coreg to 12.5mg TID (takes AM, evening meal, qHS) because he would wake up feeling weak and hypotensive if he took the full 25mg dose at night. BP would be 90/50 in AM in that scenario. No other medication changes recently.     PAST MEDICAL HISTORY:  Past Medical History   Diagnosis Date     Chronic systolic congestive heart failure (H)      Ischemic cardiomyopathy      ICD (implantable cardioverter-defibrillator) in place      HTN (hypertension)      Hyperlipidemia      Peripheral vascular disease (H)      CVA (cerebral vascular accident) (H)      Atrial fibrillation (H), paroxysmal         Other medical history includes   - colonic polyp in 11/2010   - atrophic gastritis  -  Haro esophagus   - benign neoplasm of the colon in 2011   - chronic abdominal pain  -  diverticulosis, bile duct hematoma per liver biopsy,  - colectomy 01/05/2011  - a stroke in 10/2011   - right occipital residual vision changes  - Hypertension   - nasal polyp   - ICD placement in 06/2011 for primary prevention.      ALLERGIES:     1.  Aggrenox, severe nausea.   2.  Alcohol, hives.   3.  Effient, rash and itching.   4.  Demerol, itching, nausea and vomiting.   5.  Flagyl.   6.  Levaquin.     SOCIAL HISTORY:  The patient works full-time in the construction business.  He does not drink, smoke or use drugs, and he never has.  [place] of [place].  He has 1 son and a grandson that he is very close with.       CURRENT MEDICATIONS:  Current Outpatient Prescriptions   Medication Sig Dispense Refill     enalapril (VASOTEC) 5 MG tablet Take 1 tablet (5 mg) by mouth 2 times daily 30 tablet 1     Clopidogrel Bisulfate  "(PLAVIX PO) Take 75 mg by mouth daily       SPIRONOLACTONE PO Take 12.5 mg by mouth daily       Rosuvastatin Calcium (CRESTOR PO) Take 40 mg by mouth daily       Magic Mouthwash (FV std formula) lidocaine visc 2% 2.5mL/5mL & maalox/mylanta w/ simeth 2.5mL/5mL & diphenhydrAMINE 5mg/5mL Swish and swallow 10 mLs in mouth every 6 hours as needed for mouth sores       Furosemide (LASIX PO) Take 20 mg by mouth       Carvedilol (COREG PO) Take 12.5 mg by mouth         ROS:   Answers for HPI/ROS submitted by the patient on 12/28/2017   General Symptoms: No  Skin Symptoms: No  HENT Symptoms: No  EYE SYMPTOMS: No  HEART SYMPTOMS: No  LUNG SYMPTOMS: No  INTESTINAL SYMPTOMS: No  URINARY SYMPTOMS: No  REPRODUCTIVE SYMPTOMS: No  SKELETAL SYMPTOMS: No  BLOOD SYMPTOMS: No  NERVOUS SYSTEM SYMPTOMS: No  MENTAL HEALTH SYMPTOMS: No    Constitutional: No fever, chills, or sweats. Intentional weight loss 10 lbs.   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  No ear ache, epistaxis, sore throat.   Allergies/Immunologic: Negative.   Respiratory: No cough, hemoptysis.   Cardiovascular: As per HPI.   GI: No nausea, vomiting, hematemesis, melena, or hematochezia.  : No urinary frequency, dysuria, or hematuria.   Integument: Negative.   Psychiatric: Negative.   Neuro: Negative.   Endocrinology: Negative.   Musculoskeletal: Negative.    EXAM:  /87 (BP Location: Right arm, Patient Position: Chair, Cuff Size: Adult Regular)  Pulse 75  Ht 1.702 m (5' 7\")  Wt 74.6 kg (164 lb 6.4 oz)  SpO2 96%  BMI 25.75 kg/m2  General: appears comfortable, alert and articulate  Head: normocephalic, atraumatic  Eyes: anicteric sclera, EOMI  Neck: no adenopathy  Orophyarynx: moist mucosa, no lesions, dentition intact  Heart: regular, S1/S2, no murmur, gallop, rub, estimated JVP  <8 without HJR  Lungs: clear, no rales or wheezing  Abdomen: soft, non-tender, bowel sounds present, no hepatosplenomegaly  Extremities: no clubbing, cyanosis or edema  Neurological: normal speech " and affect, no gross motor deficits    Labs:  CBC RESULTS:  Lab Results   Component Value Date    WBC 7.1 07/21/2016    RBC 5.37 07/21/2016    HGB 15.0 07/21/2016    HCT 46.5 07/21/2016    MCV 87 07/21/2016    MCH 27.9 07/21/2016    MCHC 32.3 07/21/2016    RDW 14.0 07/21/2016     07/21/2016       CMP RESULTS:  Lab Results   Component Value Date     01/04/2018    POTASSIUM 4.2 01/04/2018    CHLORIDE 104 01/04/2018    CO2 30 01/04/2018    ANIONGAP 6 01/04/2018    GLC 94 01/04/2018    BUN 6 (L) 01/04/2018    CR 0.89 01/04/2018    GFRESTIMATED 86 01/04/2018    GFRESTBLACK >90 01/04/2018    JD 8.9 01/04/2018    BILITOTAL 0.6 07/21/2016    ALBUMIN 3.4 07/21/2016    ALKPHOS 59 07/21/2016    ALT 17 07/21/2016    AST 13 07/21/2016      INR RESULTS:  Lab Results   Component Value Date    INR 1.00 07/21/2016     Lab Results   Component Value Date    MAG 2.3 07/21/2016     No results found for: NTBNPI  Lab Results   Component Value Date    NTBNP 318 (H) 07/21/2016     Coronary Angiogram 2011  1. Widely patent stents in the ramus intermedius and LAD.   2. Two-vessel coronary artery disease.  3. Moderate left ventricular chamber enlargement.   4. Left ventricular systolic function is severely reduced with a visually estimated ejection fraction of 15 to 20% and global hypokinesis.   5. Anterior apical and inferior akinesis.   6. Possible left ventricular thrombus.     Right heart catheterization from 01/24/2014 shows an RA of 8, RV 30/8, PA 29/16 with a wedge of 11, cardiac output 3.95 L/min, body surface area of 1.87 giving a normal cardiac index, PA saturation of 76%, normal pulmonary vascular resistance.    Echocardiogram from 05/14/2016 shows LV size is normal.  Qualitative ejection fraction is 25%-30%.  There is no mural thrombus.  Apical akinesis.  Severe global hypokinesis of the left ventricle.  Pacemaker noted within the right ventricle.  No atrial septal defect.  I do not have LV dimensions or any valves  reported on with this report.  Single-chamber ICD function today shows 1 episode of 9 beats of nonsustained VT.  No tachy therapies.      Last stress test with a Lexiscan 12/30/2013:  Small territories of periinfarct reversibility and 2 large territories of fixed defects, severely reduced EF of 25%.  CT angio of the neck:  Mild atherosclerosis, no significant narrowing or dilation of intracranial circulation.  This was performed on 05/14/2016.     ICD Interrogation 10/3/17:  Intrinsic rhythm is a regular ventricular rhythm @ 78 bpm. Normal ICD function. No arrhythmias recorded.  <1%. Lead trends appear stable. Battery estimates 6.5 years to CHARISSA. Plan for pt to send a remote transmission on 1/4/18 and RTC in 6 months. Single lead ICD.  ====================================================================================    Assessment and Plan:   In summary, this is a very pleasant, 62-year-old man with a long-standing history of ischemic cardiomyopathy with an ejection fraction in the 25% range who has been stable on medical therapy for many years with an ICD for primary prevention.  Today, he appears euvolemic without evidence of volume overload and has been without any heart failure hospitalizations.  He is NYHA class I, stage C today.  He is on the correct medication regimen including high dose statin, Crestor 40 mg daily, enalapril 5 mg twice daily, a full dose aspirin, coreg, aldactone and Plavix (indefinitely after CVA). His BMP today is unremarkable.  He remains at this time too well for consideration of advanced therapies at this time and we should continue the current medical regimen.     We offered to reduce the dose of Coreg to 18.75mg BID for convenience. He will try this and monitor for hypotension. His intolerance of higher dose may be due to recent 10 lb weight loss.       With regard to prevention of sudden cardiac death, his defibrillator is working properly.  He does not meet criteria for CRT. He  will do a remote check tonight.     With regards to his history of stroke, he has had an echocardiogram since this last possible TIA, which did not show an LV thrombus or shunt.  He had a repeat Ziopatch, which did not show any atrial fibrillation; therefore, he does not need anticoagulation. He is on a full dose Aspirin and Plavix for his history of coronary disease as detailed above.     Please feel free to contact me if you have any further questions.  We will have him see [facility] in 3 months and me in 1 year (sooner PRN).  Plan for a repeat echocardiogram and NTproBNP with next visit to [facility] clinic.     1. Chronic systolic heart failure secondary to coronary artery disease.  Ischemic cardiomyopathy.  Stage C  NYHA Class I  ACEi/ARB yes  BB yes  Aldosterone antagonist yes  SCD prophylaxis ICD  % BiV pacing: N/A  Fluid status euvolemic  NSAID use: none     Patient seen and discussed with Dr. Kimberly Maynard in clinic.    Elma Suero MD  Cardiology Fellow  [phone]      I have seen and examined the patient with the house staff on January 4, 2018 and agree with the outlined assessment and plan.      Kimberly Maynard MD   of Medicine   [facility] Division of Cardiology         CC  Patient Care Team:  Maykel Barber MD as PCP - General (Internal Medicine)  Jael Roblero, RN as Nurse Coordinator (Cardiology)  Savana Renae, RN as Nurse Coordinator (Cardiology)  Jeanie Hernandez APRN CNP as Nurse Practitioner (Nurse Practitioner - Gerontology)

## 2018-01-04 NOTE — PROGRESS NOTES
"[facility]  2018    RE: Matt Hester   MRN: [id number]   : 1955      Dear Colleagues:        We had the pleasure of seeing Mr. Matt Hester in the [facility] Failure Clinic on 2018.  As you know, he is a very pleasant, 62-year-old man with a long-standing history of ischemic cardiomyopathy, and I will detail his cardiac history below.  He has been followed in [place] and was previously seeing Jun Velazquez as well as one of our nurse practitioners, who has now moved here, Jeanie Hernandez.  When he was first diagnosed, his ejection fraction was in the 10%-15% range.  He has now been stable in the 25% range with \"an excellent quality of life\" for many years.      His cardiac history is summarized as follows:  His first MI was in .  In , he had an anterior wall MI requiring stenting.  He had a single-chamber ICD implanted in 2004 for primary prevention and a generator change on 2010 and again in 2011.  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  He has regained function of his left arm but his visual deficit persists.    Today he continues to feel quite well. In early December he had an episode of diverticulitis with diarrhea requiring antibiotics. No issues with fluid retention at that time. Has lost about 10 lbs in the past 1 year due to eating better (less calories) and getting a little more exercise. 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 Lost vision in his left eye at the time of his stroke. Rarely uses Lasix 20mg PRN (less than once per month, which he only uses for dyspnea). No orthopnea, PND, chest pain, palpitations, syncope, ICD shocks. He plans for a remote ICD transmission today. He had to reduce the dose of " Coreg to 12.5mg TID (takes AM, evening meal, qHS) because he would wake up feeling weak and hypotensive if he took the full 25mg dose at night. BP would be 90/50 in AM in that scenario. No other medication changes recently.     PAST MEDICAL HISTORY:  Past Medical History   Diagnosis Date     Chronic systolic congestive heart failure (H)      Ischemic cardiomyopathy      ICD (implantable cardioverter-defibrillator) in place      HTN (hypertension)      Hyperlipidemia      Peripheral vascular disease (H)      CVA (cerebral vascular accident) (H)      Atrial fibrillation (H), paroxysmal         Other medical history includes   - colonic polyp in 11/2010   - atrophic gastritis  -  Haro esophagus   - benign neoplasm of the colon in 2011   - chronic abdominal pain  -  diverticulosis, bile duct hematoma per liver biopsy,  - colectomy 01/05/2011  - a stroke in 10/2011   - right occipital residual vision changes  - Hypertension   - nasal polyp   - ICD placement in 06/2011 for primary prevention.      ALLERGIES:     1.  Aggrenox, severe nausea.   2.  Alcohol, hives.   3.  Effient, rash and itching.   4.  Demerol, itching, nausea and vomiting.   5.  Flagyl.   6.  Levaquin.     SOCIAL HISTORY:  The patient works full-time in the construction business.  He does not drink, smoke or use drugs, and he never has.  [place] lives [place] minutes north of [place].  He has 1 son and a grandson that he is very close with.       CURRENT MEDICATIONS:  Current Outpatient Prescriptions   Medication Sig Dispense Refill     enalapril (VASOTEC) 5 MG tablet Take 1 tablet (5 mg) by mouth 2 times daily 30 tablet 1     Clopidogrel Bisulfate (PLAVIX PO) Take 75 mg by mouth daily       SPIRONOLACTONE PO Take 12.5 mg by mouth daily       Rosuvastatin Calcium (CRESTOR PO) Take 40 mg by mouth daily       Magic Mouthwash (FV std formula) lidocaine visc 2% 2.5mL/5mL & maalox/mylanta w/ simeth 2.5mL/5mL & diphenhydrAMINE 5mg/5mL Swish and swallow 10 mLs in  "mouth every 6 hours as needed for mouth sores       Furosemide (LASIX PO) Take 20 mg by mouth       Carvedilol (COREG PO) Take 12.5 mg by mouth         ROS:   Answers for HPI/ROS submitted by the patient on 12/28/2017   General Symptoms: No  Skin Symptoms: No  HENT Symptoms: No  EYE SYMPTOMS: No  HEART SYMPTOMS: No  LUNG SYMPTOMS: No  INTESTINAL SYMPTOMS: No  URINARY SYMPTOMS: No  REPRODUCTIVE SYMPTOMS: No  SKELETAL SYMPTOMS: No  BLOOD SYMPTOMS: No  NERVOUS SYSTEM SYMPTOMS: No  MENTAL HEALTH SYMPTOMS: No    Constitutional: No fever, chills, or sweats. Intentional weight loss 10 lbs.   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  No ear ache, epistaxis, sore throat.   Allergies/Immunologic: Negative.   Respiratory: No cough, hemoptysis.   Cardiovascular: As per HPI.   GI: No nausea, vomiting, hematemesis, melena, or hematochezia.  : No urinary frequency, dysuria, or hematuria.   Integument: Negative.   Psychiatric: Negative.   Neuro: Negative.   Endocrinology: Negative.   Musculoskeletal: Negative.    EXAM:  /87 (BP Location: Right arm, Patient Position: Chair, Cuff Size: Adult Regular)  Pulse 75  Ht 1.702 m (5' 7\")  Wt 74.6 kg (164 lb 6.4 oz)  SpO2 96%  BMI 25.75 kg/m2  General: appears comfortable, alert and articulate  Head: normocephalic, atraumatic  Eyes: anicteric sclera, EOMI  Neck: no adenopathy  Orophyarynx: moist mucosa, no lesions, dentition intact  Heart: regular, S1/S2, no murmur, gallop, rub, estimated JVP  <8 without HJR  Lungs: clear, no rales or wheezing  Abdomen: soft, non-tender, bowel sounds present, no hepatosplenomegaly  Extremities: no clubbing, cyanosis or edema  Neurological: normal speech and affect, no gross motor deficits    Labs:  CBC RESULTS:  Lab Results   Component Value Date    WBC 7.1 07/21/2016    RBC 5.37 07/21/2016    HGB 15.0 07/21/2016    HCT 46.5 07/21/2016    MCV 87 07/21/2016    MCH 27.9 07/21/2016    MCHC 32.3 07/21/2016    RDW 14.0 07/21/2016     07/21/2016       CMP " RESULTS:  Lab Results   Component Value Date     01/04/2018    POTASSIUM 4.2 01/04/2018    CHLORIDE 104 01/04/2018    CO2 30 01/04/2018    ANIONGAP 6 01/04/2018    GLC 94 01/04/2018    BUN 6 (L) 01/04/2018    CR 0.89 01/04/2018    GFRESTIMATED 86 01/04/2018    GFRESTBLACK >90 01/04/2018    JD 8.9 01/04/2018    BILITOTAL 0.6 07/21/2016    ALBUMIN 3.4 07/21/2016    ALKPHOS 59 07/21/2016    ALT 17 07/21/2016    AST 13 07/21/2016      INR RESULTS:  Lab Results   Component Value Date    INR 1.00 07/21/2016     Lab Results   Component Value Date    MAG 2.3 07/21/2016     No results found for: NTBNPI  Lab Results   Component Value Date    NTBNP 318 (H) 07/21/2016     Coronary Angiogram 2011  1. Widely patent stents in the ramus intermedius and LAD.   2. Two-vessel coronary artery disease.  3. Moderate left ventricular chamber enlargement.   4. Left ventricular systolic function is severely reduced with a visually estimated ejection fraction of 15 to 20% and global hypokinesis.   5. Anterior apical and inferior akinesis.   6. Possible left ventricular thrombus.     Right heart catheterization from 01/24/2014 shows an RA of 8, RV 30/8, PA 29/16 with a wedge of 11, cardiac output 3.95 L/min, body surface area of 1.87 giving a normal cardiac index, PA saturation of 76%, normal pulmonary vascular resistance.    Echocardiogram from 05/14/2016 shows LV size is normal.  Qualitative ejection fraction is 25%-30%.  There is no mural thrombus.  Apical akinesis.  Severe global hypokinesis of the left ventricle.  Pacemaker noted within the right ventricle.  No atrial septal defect.  I do not have LV dimensions or any valves reported on with this report.  Single-chamber ICD function today shows 1 episode of 9 beats of nonsustained VT.  No tachy therapies.      Last stress test with a Lexiscan 12/30/2013:  Small territories of periinfarct reversibility and 2 large territories of fixed defects, severely reduced EF of 25%.  CT angio  of the neck:  Mild atherosclerosis, no significant narrowing or dilation of intracranial circulation.  This was performed on 05/14/2016.     ICD Interrogation 10/3/17:  Intrinsic rhythm is a regular ventricular rhythm @ 78 bpm. Normal ICD function. No arrhythmias recorded.  <1%. Lead trends appear stable. Battery estimates 6.5 years to CHARISSA. Plan for pt to send a remote transmission on 1/4/18 and RTC in 6 months. Single lead ICD.  ====================================================================================    Assessment and Plan:   In summary, this is a very pleasant, 62-year-old man with a long-standing history of ischemic cardiomyopathy with an ejection fraction in the 25% range who has been stable on medical therapy for many years with an ICD for primary prevention.  Today, he appears euvolemic without evidence of volume overload and has been without any heart failure hospitalizations.  He is NYHA class I, stage C today.  He is on the correct medication regimen including high dose statin, Crestor 40 mg daily, enalapril 5 mg twice daily, a full dose aspirin, coreg, aldactone and Plavix (indefinitely after CVA). His BMP today is unremarkable.  He remains at this time too well for consideration of advanced therapies at this time and we should continue the current medical regimen.     We offered to reduce the dose of Coreg to 18.75mg BID for convenience. He will try this and monitor for hypotension. His intolerance of higher dose may be due to recent 10 lb weight loss.       With regard to prevention of sudden cardiac death, his defibrillator is working properly.  He does not meet criteria for CRT. He will do a remote check tonight.     With regards to his history of stroke, he has had an echocardiogram since this last possible TIA, which did not show an LV thrombus or shunt.  He had a repeat Ziopatch, which did not show any atrial fibrillation; therefore, he does not need anticoagulation. He is on a full  dose Aspirin and Plavix for his history of coronary disease as detailed above.     Please feel free to contact me if you have any further questions.  We will have him see [facility] in 3 months and me in 1 year (sooner PRN).  Plan for a repeat echocardiogram and NTproBNP with next visit to [facility] clinic.     1. Chronic systolic heart failure secondary to coronary artery disease.  Ischemic cardiomyopathy.  Stage C  NYHA Class I  ACEi/ARB yes  BB yes  Aldosterone antagonist yes  SCD prophylaxis ICD  % BiV pacing: N/A  Fluid status euvolemic  NSAID use: none     Patient seen and discussed with Dr. Kimberly Maynard in clinic.    Elma Suero MD  Cardiology Fellow  [phone]      I have seen and examined the patient with the house staff on January 4, 2018 and agree with the outlined assessment and plan.      Kimberly Maynard MD   of Medicine   [facility] Division of Cardiology         CC  Patient Care Team:  Maykel Barber MD as PCP - General (Internal Medicine)  Jael Roblero, RN as Nurse Coordinator (Cardiology)  Kimberly Maynard MD as MD (Cardiology)  Savana Renae, RN as Nurse Coordinator (Cardiology)  Edel Hernandez, APRN CNP as Nurse Practitioner (Nurse Practitioner - Gerontology)  EDEL HERNANDEZ

## 2018-01-04 NOTE — MR AVS SNAPSHOT
After Visit Summary   1/4/2018    Matt Hester    MRN: [id number]           Patient Information     Date Of Birth          1955        Visit Information        Provider Department      1/4/2018 8:00 AM Kimberly Maynard MD [facility]        Today's Diagnoses     Chronic systolic congestive heart failure (H)    -  1      Care Instructions    You were seen today in the Cardiovascular Clinic at the [facility].     Cardiology Providers you saw during your visit:  Dr. Maynard    Medication Changes:  1- Please DECREASE Carvedilol (Coreg) to 18.75 mg by mouth twice daily.    Follow up:    1- Follow up with Jeanie Hernandez as scheduled on April 3rd with ECHO and blood work ( BMP and NT pro BNP)    2- Follow up with Dr. Maynard in 1 year    Please call if you have any questions or concerns.    Annmarie Ruelas RN  Cardiology Nurse Care Coordinator  [phone], press option # 1 to be routed to the [place] then option # 3 for medical questions to speak with a nurse    Keep up the good work!    Take care!            Follow-ups after your visit        Additional Services     Follow-Up with Cardiologist                 Your next 10 appointments already scheduled     Apr 03, 2018  1:30 PM CDT   Lab with  LAB   [facility] ([facility])    [address] [place] [id number] [phone]            Apr 03, 2018  2:00 PM CDT   Ech Complete with [id number]   [facility] Echo ([facility])    [address] [place] [id number] [phone]           1. Please bring or wear a comfortable two-piece outfit. 2. You may eat, drink and take your normal medicines. 3. For any questions that cannot be answered, please contact the ordering physician            Apr 03, 2018  3:00 PM CDT   (Arrive by 2:45 PM)   Implanted Defibulator with  Cv Device 1   [facility] ([facility])    [address] [facility]  [address] [place] [id number] [phone]            Apr 03, 2018  3:30 PM CDT   (Arrive by 3:15 PM)   CORE RETURN with NEEMA Turk CNP   [facility] ([facility])    [address] [place] [address]  [address] [place] [id number] [phone]              Future tests that were ordered for you today     Open Future Orders        Priority Expected Expires Ordered    Follow-Up with Cardiologist Routine 12/4/2018 1/4/2019 1/4/2018    N terminal pro BNP outpatient Routine 4/3/2018 5/8/2018 1/4/2018    Echocardiogram Routine 4/3/2018 4/11/2018 1/4/2018    Basic metabolic panel Routine 12/4/2018 1/4/2019 1/4/2018            Who to contact     If you have questions or need follow up information about today's clinic visit or your schedule please contact [facility] directly at [phone].  Normal or non-critical lab and imaging results will be communicated to you by iDentiMobhart, letter or phone within 4 business days after the clinic has received the results. If you do not hear from us within 7 days, please contact the clinic through [employer] or phone. If you have a critical or abnormal lab result, we will notify you by phone as soon as possible.  Submit refill requests through TRUSTe or call your pharmacy and they will forward the refill request to us. Please allow 3 business days for your refill to be completed.          Additional Information About Your Visit        TRUSTe Information     [employer]e gives you secure access to your electronic health record. If you see a primary care provider, you can also send messages to your care team and make appointments. If you have questions, please call your primary care clinic.  If you do not have a primary care provider, please call [phone] and they will assist you.        Care EveryWhere ID     This is your Care EveryWhere ID. This could be used by other organizations to access  "your [facility] medical records  [id number]        Your Vitals Were     Pulse Height Pulse Oximetry BMI (Body Mass Index)          75 1.702 m (5' 7\") 96% 25.75 kg/m2         Blood Pressure from Last 3 Encounters:   01/04/18 126/87   10/03/17 114/78   07/25/17 124/80    Weight from Last 3 Encounters:   01/04/18 74.6 kg (164 lb 6.4 oz)   10/03/17 76.4 kg (168 lb 6.4 oz)   07/25/17 77.8 kg (171 lb 9.6 oz)                 Today's Medication Changes          These changes are accurate as of: 1/4/18  8:52 AM.  If you have any questions, ask your nurse or doctor.               These medicines have changed or have updated prescriptions.        Dose/Directions    carvedilol 12.5 MG tablet   Commonly known as:  COREG   This may have changed:    - medication strength  - how much to take  - when to take this   Used for:  Chronic systolic congestive heart failure (H)   Changed by:  Kimberly Maynard MD        Dose:  18.75 mg   Take 1.5 tablets (18.75 mg) by mouth 2 times daily (with meals)   Quantity:  270 tablet   Refills:  3            Where to get your medicines      These medications were sent to [facility] [address] [place] [facility] [place], [place] - [address]  [address] [facility] [facility] [address]     Phone:  [phone]     carvedilol 12.5 MG tablet                Primary Care Provider Office Phone # Fax #    Maykel Barber [phone]       [facility] [address] [facility] [address]        Equal Access to Services     [facility] Hospital[facility]DRA AH: Hadmansoor dillon Socindy, waaxda luqadaha, qaybta kaalmajeanie eller. So [facility] [phone].    ATENCIÓN: Si habla español, tiene a castaneda disposición servicios gratuitos de asistencia lingüística. Gabrielle al [phone].    We comply with applicable federal civil rights laws and [place] laws. We do not discriminate on the basis of race, color, national origin, age, disability, sex, sexual orientation, or gender " identity.            Thank you!     Thank you for choosing [facility]  for your care. Our goal is always to provide you with excellent care. Hearing back from our patients is one way we can continue to improve our services. Please take a few minutes to complete the written survey that you may receive in the mail after your visit with us. Thank you!             Your Updated Medication List - Protect others around you: Learn how to safely use, store and throw away your medicines at www.disposemymeds.org.          This list is accurate as of: 1/4/18  8:52 AM.  Always use your most recent med list.                   Brand Name Dispense Instructions for use Diagnosis    carvedilol 12.5 MG tablet    COREG    270 tablet    Take 1.5 tablets (18.75 mg) by mouth 2 times daily (with meals)    Chronic systolic congestive heart failure (H)       CRESTOR PO      Take 40 mg by mouth daily        enalapril 5 MG tablet    VASOTEC    30 tablet    Take 1 tablet (5 mg) by mouth 2 times daily    Ischemic cardiomyopathy       LASIX PO      Take 20 mg by mouth        lidocaine visc 2% 2.5mL/5mL & maalox/mylanta w/ simeth 2.5mL/5mL & diphenhydrAMINE 5mg/5mL Susp suspension    [facility] Mouthwash [facility]     Swish and swallow 10 mLs in mouth every 6 hours as needed for mouth sores        PLAVIX PO      Take 75 mg by mouth daily        SPIRONOLACTONE PO      Take 12.5 mg by mouth daily

## 2018-04-03 ENCOUNTER — PRE VISIT (OUTPATIENT)
Dept: CARDIOLOGY | Facility: CLINIC | Age: 63
End: 2018-04-03

## 2018-04-03 DIAGNOSIS — I50.22 CHRONIC SYSTOLIC HEART FAILURE (H): Primary | ICD-10-CM

## 2018-04-10 ENCOUNTER — RADIANT APPOINTMENT (OUTPATIENT)
Dept: CARDIOLOGY | Facility: CLINIC | Age: 63
End: 2018-04-10
Payer: COMMERCIAL

## 2018-04-10 ENCOUNTER — OFFICE VISIT (OUTPATIENT)
Dept: CARDIOLOGY | Facility: CLINIC | Age: 63
End: 2018-04-10
Attending: NURSE PRACTITIONER
Payer: MEDICARE

## 2018-04-10 VITALS
BODY MASS INDEX: 25.43 KG/M2 | SYSTOLIC BLOOD PRESSURE: 128 MMHG | WEIGHT: 162 LBS | DIASTOLIC BLOOD PRESSURE: 88 MMHG | HEIGHT: 67 IN | HEART RATE: 96 BPM | OXYGEN SATURATION: 83 %

## 2018-04-10 DIAGNOSIS — I50.22 CHRONIC SYSTOLIC HEART FAILURE (H): ICD-10-CM

## 2018-04-10 DIAGNOSIS — I25.5 ISCHEMIC CARDIOMYOPATHY: ICD-10-CM

## 2018-04-10 DIAGNOSIS — E83.51 HYPOCALCEMIA: ICD-10-CM

## 2018-04-10 DIAGNOSIS — I50.22 CHRONIC SYSTOLIC CONGESTIVE HEART FAILURE (H): ICD-10-CM

## 2018-04-10 DIAGNOSIS — I50.22 CHRONIC SYSTOLIC CONGESTIVE HEART FAILURE (H): Primary | ICD-10-CM

## 2018-04-10 LAB
ANION GAP SERPL CALCULATED.3IONS-SCNC: 4 MMOL/L (ref 3–14)
BUN SERPL-MCNC: 7 MG/DL (ref 7–30)
CALCIUM SERPL-MCNC: 8.8 MG/DL (ref 8.5–10.1)
CHLORIDE SERPL-SCNC: 102 MMOL/L (ref 94–109)
CO2 SERPL-SCNC: 29 MMOL/L (ref 20–32)
CREAT SERPL-MCNC: 0.93 MG/DL (ref 0.66–1.25)
GFR SERPL CREATININE-BSD FRML MDRD: 82 ML/MIN/1.7M2
GLUCOSE SERPL-MCNC: 98 MG/DL (ref 70–99)
POTASSIUM SERPL-SCNC: 3.9 MMOL/L (ref 3.4–5.3)
SODIUM SERPL-SCNC: 135 MMOL/L (ref 133–144)

## 2018-04-10 PROCEDURE — 36415 COLL VENOUS BLD VENIPUNCTURE: CPT | Performed by: NURSE PRACTITIONER

## 2018-04-10 PROCEDURE — G0463 HOSPITAL OUTPT CLINIC VISIT: HCPCS | Mod: 25,ZF

## 2018-04-10 PROCEDURE — 80048 BASIC METABOLIC PNL TOTAL CA: CPT | Performed by: NURSE PRACTITIONER

## 2018-04-10 PROCEDURE — 93282 PRGRMG EVAL IMPLANTABLE DFB: CPT | Mod: ZF

## 2018-04-10 PROCEDURE — 99214 OFFICE O/P EST MOD 30 MIN: CPT | Mod: 25 | Performed by: NURSE PRACTITIONER

## 2018-04-10 PROCEDURE — 93282 PRGRMG EVAL IMPLANTABLE DFB: CPT | Mod: 26 | Performed by: INTERNAL MEDICINE

## 2018-04-10 RX ADMIN — Medication 6 ML: at 15:45

## 2018-04-10 ASSESSMENT — PAIN SCALES - GENERAL: PAINLEVEL: NO PAIN (0)

## 2018-04-10 NOTE — PATIENT INSTRUCTIONS
It was a pleasure to see you in clinic today.  Please do not hesitate to call with any questions or concerns.  You are scheduled for a remote transmission on 7/10/18.  We look forward to seeing you in clinic at your next device check in 6 months.    Emani Prescott, RN, MS, CCRN  Electrophysiology Nurse Clinician  [facility]    During Business Hours Please Call:  [phone]  After Hours Please Call:  [phone] - select option #4 and ask for job code [phone]

## 2018-04-10 NOTE — LETTER
4/10/2018      RE: Matt Hester  [address]  [facility] [address]       Dear Colleague,    Thank you for the opportunity to participate in the care of your patient, Matt Hester, at the [facility] HE[facility]. Please see a copy of my visit note below.     63-year-old man with a long-standing history of ischemic cardiomyopathy,presents for follow up of HFrEF. He was last seen by Dr. Maynard 1/4/18. At that time his Coreg dose was decreased due to hypotension.     He states he feels well. He is working full time without difficulty. Pt denies SOB, orthopnea, PND, chest pain, belly bloating, loss of appetite, LE edema. Pt states energy level is good.   He had an echo today which is pending     PAST MEDICAL HISTORY:  Past Medical History   Diagnosis Date     Chronic systolic congestive heart failure (H)      Ischemic cardiomyopathy      ICD (implantable cardioverter-defibrillator) in place      HTN (hypertension)      Hyperlipidemia      Peripheral vascular disease (H)      CVA (cerebral vascular accident) (H)      Atrial fibrillation (H), paroxysmal         Other medical history includes   - colonic polyp in 11/2010   - atrophic gastritis  -  Haro esophagus   - benign neoplasm of the colon in 2011   - chronic abdominal pain  -  diverticulosis, bile duct hematoma per liver biopsy,  - colectomy 01/05/2011  - a stroke in 10/2011   - right occipital residual vision changes  - Hypertension   - nasal polyp   - ICD placement in 06/2011 for primary prevention.      ALLERGIES:     1.  Aggrenox, severe nausea.   2.  Alcohol, hives.   3.  Effient, rash and itching.   4.  Demerol, itching, nausea and vomiting.   5.  Flagyl.   6.  Levaquin.     SOCIAL HISTORY:  The patient works full-time in the construction business.  He does not drink, smoke or use drugs, and he never has.  [place]  He has 1 son and a 6-year-old grandson that he is very  "close with.       CURRENT MEDICATIONS:  Current Outpatient Prescriptions   Medication Sig Dispense Refill     carvedilol (COREG) 12.5 MG tablet Take 1.5 tablets (18.75 mg) by mouth 2 times daily (with meals) 270 tablet 3     enalapril (VASOTEC) 5 MG tablet Take 1 tablet (5 mg) by mouth 2 times daily 30 tablet 1     Clopidogrel Bisulfate (PLAVIX PO) Take 75 mg by mouth daily       SPIRONOLACTONE PO Take 12.5 mg by mouth daily       Rosuvastatin Calcium (CRESTOR PO) Take 40 mg by mouth daily       Magic Mouthwash (FV std formula) lidocaine visc 2% 2.5mL/5mL & maalox/mylanta w/ simeth 2.5mL/5mL & diphenhydrAMINE 5mg/5mL Swish and swallow 10 mLs in mouth every 6 hours as needed for mouth sores       Furosemide (LASIX PO) Take 20 mg by mouth         ROS:   Constitutional: No fever, chills, or sweats. No weight gain/loss.   ENT: No visual disturbance, ear ache, epistaxis, sore throat.   Allergies/Immunologic: Negative.   Respiratory:has a current dry cough, hemoptysis.   Cardiovascular: As per HPI.   GI: No nausea, vomiting, hematemesis, melena, or hematochezia.  : No urinary frequency, dysuria, or hematuria.   Integument: Negative.   Psychiatric: Negative.   Neuro: Negative.   Endocrinology: Negative.   Musculoskeletal: Negative.    EXAM:  /88  Pulse 96  Ht 1.702 m (5' 7\")  Wt 73.5 kg (162 lb)  SpO2 (!) 83%  BMI 25.37 kg/x1Szcvxdv: appears comfortable, alert and articulate  Head: normocephalic, atraumatic  Eyes: anicteric sclera, EOMI  Neck: no adenopathy  Orophyarynx: moist mucosa, no lesions, dentition intact  Heart: regular, S1/S2, no murmur, gallop, rub, estimated JVP 8cm  Lungs: clear, no rales or wheezing  Abdomen: soft, non-tender, bowel sounds present, no hepatosplenomegaly  Extremities: no clubbing, cyanosis or edema  Neurological: normal speech and affect, no gross motor deficits    Labs:  CBC RESULTS:  Lab Results   Component Value Date    WBC 7.1 07/21/2016    RBC 5.37 07/21/2016    HGB 15.0 " 07/21/2016    HCT 46.5 07/21/2016    MCV 87 07/21/2016    MCH 27.9 07/21/2016    MCHC 32.3 07/21/2016    RDW 14.0 07/21/2016     07/21/2016       CMP RESULTS:  Lab Results   Component Value Date     04/10/2018    POTASSIUM 3.9 04/10/2018    CHLORIDE 102 04/10/2018    CO2 29 04/10/2018    ANIONGAP 4 04/10/2018    GLC 98 04/10/2018    BUN 7 04/10/2018    CR 0.93 04/10/2018    GFRESTIMATED 82 04/10/2018    GFRESTBLACK >90 04/10/2018    JD 8.8 04/10/2018    BILITOTAL 0.6 07/21/2016    ALBUMIN 3.4 07/21/2016    ALKPHOS 59 07/21/2016    ALT 17 07/21/2016    AST 13 07/21/2016        Assessment and Plan:   1. Chronic systolic heart failure due to ICM  Stage B  NYHA Class 2  On Beta blocker, Ace inhibitor, aldosterone blocker,   Has ICD  Volume euvolemic - has not used lasix since last seen.    2. Hx of CVA - on Plavix    3. Hyperlipidemia: on Crestor 40mg daily    Sincerely,     NEEMA Turk CNP    CC  Patient Care Team:  Maykel Barber MD as PCP - General (Internal Medicine)  Kimberly Maynard MD as MD (Cardiology)  Savana Renae, RN as Nurse Coordinator (Cardiology)  Edel Hernandez APRN CNP as Nurse Practitioner (Nurse Practitioner - Gerontology)  EDEL HERNANDEZ

## 2018-04-10 NOTE — PROGRESS NOTES
Preliminary Device Interrogation Results.  Final physician signed paceart report to be scanned and attached.    Patient seen in clinic for evaluation and iterative programming of his [employer] single lead ICD per MD orders.  Patient has an appointment to see Jeanie Hernandez NP today.  Normal ICD function.  No arrythmias recorded.  Intrinsic rhythm = VS @ 82 bpm.   = <1%.  Estimated battery longevity to CHARISSA = 6 years.  Patient reports that he is feeling well and denies complaints.  Plan for patient to send a remote transmission in 3 months and return to clinic in 6 months.    Single lead ICD

## 2018-04-10 NOTE — PATIENT INSTRUCTIONS
"You were seen today in the Cardiovascular Clinic at the [facility].     Cardiology Providers you saw during your visit: Jeanie Heranndez NP       Results for VIJAY MCKEE (MRN [id number]) as of 4/10/2018 16:43   Ref. Range 4/10/2018 13:53   Sodium Latest Ref Range: 133 - 144 mmol/L 135   Potassium Latest Ref Range: 3.4 - 5.3 mmol/L 3.9   Chloride Latest Ref Range: 94 - 109 mmol/L 102   Carbon Dioxide Latest Ref Range: 20 - 32 mmol/L 29   Urea Nitrogen Latest Ref Range: 7 - 30 mg/dL 7   Creatinine Latest Ref Range: 0.66 - 1.25 mg/dL 0.93   GFR Estimate Latest Ref Range: >60 mL/min/1.7m2 82   GFR Estimate If Black Latest Ref Range: >60 mL/min/1.7m2 >90   Calcium Latest Ref Range: 8.5 - 10.1 mg/dL 8.8   Anion Gap Latest Ref Range: 3 - 14 mmol/L 4   Glucose Latest Ref Range: 70 - 99 mg/dL 98         Please limit your fluid intake to 2 L (64 ounces) daily.  2 Liters a day = 8.5 cups, or 64 ounces.  Please limit your salt intake to 2 grams a day or less.     If you gain 2# in 24 hours or 5# in one week call Savana Renae RN so we can adjust your medications as needed over the phone.     Please feel free to call me with any questions or concerns.       Savana Renae RN BSN CHFN  [facility]  Cardiology Care Coordinator-Heart Failure Clinic     Questions and [phone].   First press #1 for the [employer] and then press #3 for \"Medical Questions\" to reach us Cardiology Nurses.      On Call Cardiologist for after hours or on weekends: [phone]   option #4 and ask to speak to the on-call Cardiologist. Inform them you are a CORE/heart failure patient at the [place].           If you need a medication refill please contact your pharmacy.  Please allow 3 business days for your refill to be completed.  _______________________________________________________  C.O.R.E. CLINIC Cardiomyopathy, Optimization, Rehabilitation, Education   The C.O.R.E. CLINIC is a heart " failure specialty clinic within the [facility] where you will work with specialized nurse practitioners dedicated to helping patients with heart failure carefully adjust medications, receive education, and learn who and when to call if symptoms develop. They specialize in helping you better understand your condition, slow the progression of your disease, improve the length and quality of your life, help you detect future heart problems before they become life threatening, and avoid hospitalizations.    No medication changes. F/U in September.  As always, thank you for trusting us with your health care needs!

## 2018-04-10 NOTE — PROGRESS NOTES
63-year-old man with a long-standing history of ischemic cardiomyopathy,presents for follow up of HFrEF. He was last seen by Dr. Maynard 1/4/18. At that time his Coreg dose was decreased due to hypotension.     He states he feels well. He is working full time without difficulty. Pt denies SOB, orthopnea, PND, chest pain, belly bloating, loss of appetite, LE edema. Pt states energy level is good.   He had an echo today which is pending     PAST MEDICAL HISTORY:  Past Medical History   Diagnosis Date     Chronic systolic congestive heart failure (H)      Ischemic cardiomyopathy      ICD (implantable cardioverter-defibrillator) in place      HTN (hypertension)      Hyperlipidemia      Peripheral vascular disease (H)      CVA (cerebral vascular accident) (H)      Atrial fibrillation (H), paroxysmal         Other medical history includes   - colonic polyp in 11/2010   - atrophic gastritis  -  Haro esophagus   - benign neoplasm of the colon in 2011   - chronic abdominal pain  -  diverticulosis, bile duct hematoma per liver biopsy,  - colectomy 01/05/2011  - a stroke in 10/2011   - right occipital residual vision changes  - Hypertension   - nasal polyp   - ICD placement in 06/2011 for primary prevention.      ALLERGIES:     1.  Aggrenox, severe nausea.   2.  Alcohol, hives.   3.  Effient, rash and itching.   4.  Demerol, itching, nausea and vomiting.   5.  Flagyl.   6.  Levaquin.     SOCIAL HISTORY:  The patient works full-time in the construction business.  He does not drink, smoke or use drugs, and he never has.  [place].  He has 1 son and a 6-year-old grandson that he is very close with.       CURRENT MEDICATIONS:  Current Outpatient Prescriptions   Medication Sig Dispense Refill     carvedilol (COREG) 12.5 MG tablet Take 1.5 tablets (18.75 mg) by mouth 2 times daily (with meals) 270 tablet 3     enalapril (VASOTEC) 5 MG tablet Take 1 tablet (5 mg) by mouth 2 times daily 30 tablet 1  "    Clopidogrel Bisulfate (PLAVIX PO) Take 75 mg by mouth daily       SPIRONOLACTONE PO Take 12.5 mg by mouth daily       Rosuvastatin Calcium (CRESTOR PO) Take 40 mg by mouth daily       Magic Mouthwash (FV std formula) lidocaine visc 2% 2.5mL/5mL & maalox/mylanta w/ simeth 2.5mL/5mL & diphenhydrAMINE 5mg/5mL Swish and swallow 10 mLs in mouth every 6 hours as needed for mouth sores       Furosemide (LASIX PO) Take 20 mg by mouth         ROS:   Constitutional: No fever, chills, or sweats. No weight gain/loss.   ENT: No visual disturbance, ear ache, epistaxis, sore throat.   Allergies/Immunologic: Negative.   Respiratory:has a current dry cough, hemoptysis.   Cardiovascular: As per HPI.   GI: No nausea, vomiting, hematemesis, melena, or hematochezia.  : No urinary frequency, dysuria, or hematuria.   Integument: Negative.   Psychiatric: Negative.   Neuro: Negative.   Endocrinology: Negative.   Musculoskeletal: Negative.    EXAM:  /88  Pulse 96  Ht 1.702 m (5' 7\")  Wt 73.5 kg (162 lb)  SpO2 (!) 83%  BMI 25.37 kg/w1Vcvvuvc: appears comfortable, alert and articulate  Head: normocephalic, atraumatic  Eyes: anicteric sclera, EOMI  Neck: no adenopathy  Orophyarynx: moist mucosa, no lesions, dentition intact  Heart: regular, S1/S2, no murmur, gallop, rub, estimated JVP 8cm  Lungs: clear, no rales or wheezing  Abdomen: soft, non-tender, bowel sounds present, no hepatosplenomegaly  Extremities: no clubbing, cyanosis or edema  Neurological: normal speech and affect, no gross motor deficits    Labs:  CBC RESULTS:  Lab Results   Component Value Date    WBC 7.1 07/21/2016    RBC 5.37 07/21/2016    HGB 15.0 07/21/2016    HCT 46.5 07/21/2016    MCV 87 07/21/2016    MCH 27.9 07/21/2016    MCHC 32.3 07/21/2016    RDW 14.0 07/21/2016     07/21/2016       CMP RESULTS:  Lab Results   Component Value Date     04/10/2018    POTASSIUM 3.9 04/10/2018    CHLORIDE 102 04/10/2018    CO2 29 04/10/2018    ANIONGAP 4 " 04/10/2018    GLC 98 04/10/2018    BUN 7 04/10/2018    CR 0.93 04/10/2018    GFRESTIMATED 82 04/10/2018    GFRESTBLACK >90 04/10/2018    JD 8.8 04/10/2018    BILITOTAL 0.6 07/21/2016    ALBUMIN 3.4 07/21/2016    ALKPHOS 59 07/21/2016    ALT 17 07/21/2016    AST 13 07/21/2016        Assessment and Plan:   1. Chronic systolic heart failure due to ICM  Stage B  NYHA Class 2  On Beta blocker, Ace inhibitor, aldosterone blocker,   Has ICD  Volume euvolemic - has not used lasix since last seen.    2. Hx of CVA - on Plavix    3. Hyperlipidemia: on Crestor 40mg daily    CC  Patient Care Team:  Maykel Barber MD as PCP - General (Internal Medicine)  Kimberly Maynard MD as MD (Cardiology)  Savana Renae RN as Nurse Coordinator (Cardiology)  Edel Hernandez, NEEMA CNP as Nurse Practitioner (Nurse Practitioner - Gerontology)  EDEL HERNANDEZ

## 2018-04-10 NOTE — NURSING NOTE
Chief Complaint   Patient presents with     Follow Up For     Return CORE; 63 yr old male with h/o chronic systolic HF presenting for follow up with echo, device check and labs prior     Vitals were taken and medications were reconciled.     Mer Parikh RMA  4:34 PM

## 2018-04-10 NOTE — MR AVS SNAPSHOT
"              After Visit Summary   4/10/2018    Matt Mckee    MRN: [id number]           Patient Information     Date Of Birth          1955        Visit Information        Provider Department      4/10/2018 4:30 PM Jeanie Hernandez APRN [facility]        Today's Diagnoses     Chronic systolic congestive heart failure (H)    -  1      Care Instructions    You were seen today in the Cardiovascular Clinic at the [facility].     Cardiology Providers you saw during your visit: Jeanie Hernandez NP       Results for MATT MCKEE (MRN [id number]) as of 4/10/2018 16:43   Ref. Range 4/10/2018 13:53   Sodium Latest Ref Range: 133 - 144 mmol/L 135   Potassium Latest Ref Range: 3.4 - 5.3 mmol/L 3.9   Chloride Latest Ref Range: 94 - 109 mmol/L 102   Carbon Dioxide Latest Ref Range: 20 - 32 mmol/L 29   Urea Nitrogen Latest Ref Range: 7 - 30 mg/dL 7   Creatinine Latest Ref Range: 0.66 - 1.25 mg/dL 0.93   GFR Estimate Latest Ref Range: >60 mL/min/1.7m2 82   GFR Estimate If Black Latest Ref Range: >60 mL/min/1.7m2 >90   Calcium Latest Ref Range: 8.5 - 10.1 mg/dL 8.8   Anion Gap Latest Ref Range: 3 - 14 mmol/L 4   Glucose Latest Ref Range: 70 - 99 mg/dL 98         Please limit your fluid intake to 2 L (64 ounces) daily.  2 Liters a day = 8.5 cups, or 64 ounces.  Please limit your salt intake to 2 grams a day or less.     If you gain 2# in 24 hours or 5# in one week call Savana Renae RN so we can adjust your medications as needed over the phone.     Please feel free to call me with any questions or concerns.       Savana Renae RN BSN CHFN  [facility]  Cardiology Care Coordinator-Heart Failure Clinic     Questions and [phone].   First press #1 for the [facility] and then press #3 for \"Medical Questions\" to reach us Cardiology Nurses.      On Call Cardiologist for after hours or on weekends: [phone]   option #4 and ask to speak to the on-call " Cardiologist. Inform them you are a CORE/heart failure patient at the [facility].           If you need a medication refill please contact your pharmacy.  Please allow 3 business days for your refill to be completed.  _______________________________________________________  C.O.R.E. CLINIC Cardiomyopathy, Optimization, Rehabilitation, Education   The C.O.R.E. CLINIC is a heart failure specialty clinic within the [facility] where you will work with specialized nurse practitioners dedicated to helping patients with heart failure carefully adjust medications, receive education, and learn who and when to call if symptoms develop. They specialize in helping you better understand your condition, slow the progression of your disease, improve the length and quality of your life, help you detect future heart problems before they become life threatening, and avoid hospitalizations.    No medication changes. F/U in September.  As always, thank you for trusting us with your health care needs!           Follow-ups after your visit        Additional Services     Follow-Up with [facility]       F/u in Sept with Jeanie Hernandez                  Future tests that were ordered for you today     Open Future Orders        Priority Expected Expires Ordered    Follow-Up with [facility] Routine 9/3/2018 9/28/2018 4/10/2018            Who to contact     If you have questions or need follow up information about today's clinic visit or your schedule please contact [facility] directly at [phone].  Normal or non-critical lab and imaging results will be communicated to you by MyChart, letter or phone within 4 business days after the clinic has received the results. If you do not hear from us within 7 days, please contact the clinic through MyChart or phone. If you have a critical or abnormal lab result, we will notify you by phone as soon as possible.  Submit refill requests through Integral Visionhart or  "call your pharmacy and they will forward the refill request to us. Please allow 3 business days for your refill to be completed.          Additional Information About Your Visit        MyChart Information     Personalhart gives you secure access to your electronic health record. If you see a primary care provider, you can also send messages to your care team and make appointments. If you have questions, please call your primary care clinic.  If you do not have a primary care provider, please call [phone] and they will assist you.        Care EveryWhere ID     This is your Care EveryWhere ID. This could be used by other organizations to access your [place] medical records  [id number]        Your Vitals Were     Pulse Height Pulse Oximetry BMI (Body Mass Index)          96 1.702 m (5' 7\") 83% 25.37 kg/m2         Blood Pressure from Last 3 Encounters:   04/10/18 128/88   01/04/18 126/87   10/03/17 114/78    Weight from Last 3 Encounters:   04/10/18 73.5 kg (162 lb)   01/04/18 74.6 kg (164 lb 6.4 oz)   10/03/17 76.4 kg (168 lb 6.4 oz)               Primary Care Provider Office Phone # Fax #    Maykel Barber [phone]       [place] [address] [place] [address]        Equal Access to Services     CHAYO ALICEA : Hadii aad ku hadasho Soomaali, waaxda luqadaha, qaybta kaalmada adeegyada, jeanie barragan. So [facility] [phone].    ATENCIÓN: Si habla español, tiene a castaneda disposición servicios gratuitos de asistencia lingüística. Gabrielle al [phone].    We comply with applicable federal civil rights laws and [place] laws. We do not discriminate on the basis of race, color, national origin, age, disability, sex, sexual orientation, or gender identity.            Thank you!     Thank you for choosing [facility]  for your care. Our goal is always to provide you with excellent care. Hearing back from our patients is one way we can continue to improve our " services. Please take a few minutes to complete the written survey that you may receive in the mail after your visit with us. Thank you!             Your Updated Medication List - Protect others around you: Learn how to safely use, store and throw away your medicines at www.disposemymeds.org.          This list is accurate as of 4/10/18  5:09 PM.  Always use your most recent med list.                   Brand Name Dispense Instructions for use Diagnosis    carvedilol 12.5 MG tablet    COREG    270 tablet    Take 1.5 tablets (18.75 mg) by mouth 2 times daily (with meals)    Chronic systolic congestive heart failure (H)       CRESTOR PO      Take 40 mg by mouth daily        enalapril 5 MG tablet    VASOTEC    30 tablet    Take 1 tablet (5 mg) by mouth 2 times daily    Ischemic cardiomyopathy       LASIX PO      Take 20 mg by mouth        lidocaine visc 2% 2.5mL/5mL & maalox/mylanta w/ simeth 2.5mL/5mL & diphenhydrAMINE 5mg/5mL Susp suspension    [facility] Mouthwash [facility]     Swish and swallow 10 mLs in mouth every 6 hours as needed for mouth sores        PLAVIX PO      Take 75 mg by mouth daily        SPIRONOLACTONE PO      Take 12.5 mg by mouth daily

## 2018-04-10 NOTE — MR AVS SNAPSHOT
After Visit Summary   4/10/2018    Matt Hester    MRN: [id number]           Patient Information     Date Of Birth          1955        Visit Information        Provider Department      4/10/2018 4:00 PM 1,  Cv Device [facility]        Today's Diagnoses     Ischemic cardiomyopathy          Care Instructions    It was a pleasure to see you in clinic today.  Please do not hesitate to call with any questions or concerns.  You are scheduled for a remote transmission on 7/10/18.  We look forward to seeing you in clinic at your next device check in 6 months.    Emani Prescott, RN, MS, CCRN  Electrophysiology Nurse Clinician  [facility]    During Business Hours Please Call:  [phone]  After Hours Please Call:  [phone] - select option #4 and ask for job code [phone]                        Follow-ups after your visit        Additional Services     Follow-Up with Device Clinic-6 months                 Future tests that were ordered for you today     Open Future Orders        Priority Expected Expires Ordered    Follow-Up with Device Clinic-6 months Routine 10/7/2018 1/5/2019 4/10/2018    Follow-Up with CORE Clinic Routine 9/3/2018 9/28/2018 4/10/2018            Who to contact     If you have questions or need follow up information about today's clinic visit or your schedule please contact [facility] directly at [phone].  Normal or non-critical lab and imaging results will be communicated to you by MyChart, letter or phone within 4 business days after the clinic has received the results. If you do not hear from us within 7 days, please contact the clinic through MyChart or phone. If you have a critical or abnormal lab result, we will notify you by phone as soon as possible.  Submit refill requests through JumpTime or call your pharmacy and they will forward the refill request to us. Please allow 3 business days for your refill to be completed.           Additional Information About Your Visit        MyChart Information     Venustech gives you secure access to your electronic health record. If you see a primary care provider, you can also send messages to your care team and make appointments. If you have questions, please call your primary care clinic.  If you do not have a primary care provider, please call [phone] and they will assist you.        Care EveryWhere ID     This is your Care EveryWhere ID. This could be used by other organizations to access your [facility] medical records  [id number]         Blood Pressure from Last 3 Encounters:   04/10/18 128/88   01/04/18 126/87   10/03/17 114/78    Weight from Last 3 Encounters:   04/10/18 73.5 kg (162 lb)   01/04/18 74.6 kg (164 lb 6.4 oz)   10/03/17 76.4 kg (168 lb 6.4 oz)              We Performed the Following     Follow-Up with Device Clinic - 3 months     ICD DEVICE PROGRAMMING EVAL, SINGLE LEAD ICD        Primary Care Provider Office Phone # Fax #    Maykel Barber [phone]       [facility] [address] [facility] [address]        Equal Access to Services     CHAYO ALICEA : Hadii aad ku hadasho Soomaali, waaxda luqadaha, qaybta kaalmada adeegyada, waxay nanciin hayfrankn jolynn barragan. So [facility] [phone].    ATENCIÓN: Si habla español, tiene a castaneda disposición servicios gratuitos de asistencia lingüística. Sharoname al [phone].    We comply with applicable federal civil rights laws and [place] laws. We do not discriminate on the basis of race, color, national origin, age, disability, sex, sexual orientation, or gender identity.            Thank you!     Thank you for choosing [facility]  for your care. Our goal is always to provide you with excellent care. Hearing back from our patients is one way we can continue to improve our services. Please take a few minutes to complete the written survey that you may receive in the mail after your visit with us. Thank  you!             Your Updated Medication List - Protect others around you: Learn how to safely use, store and throw away your medicines at www.disposemymeds.org.          This list is accurate as of 4/10/18  5:23 PM.  Always use your most recent med list.                   Brand Name Dispense Instructions for use Diagnosis    carvedilol 12.5 MG tablet    COREG    270 tablet    Take 1.5 tablets (18.75 mg) by mouth 2 times daily (with meals)    Chronic systolic congestive heart failure (H)       CRESTOR PO      Take 40 mg by mouth daily        enalapril 5 MG tablet    VASOTEC    30 tablet    Take 1 tablet (5 mg) by mouth 2 times daily    Ischemic cardiomyopathy       LASIX PO      Take 20 mg by mouth        lidocaine visc 2% 2.5mL/5mL & maalox/mylanta w/ simeth 2.5mL/5mL & diphenhydrAMINE 5mg/5mL Susp suspension    [facility] Mouthwash [facility]     Swish and swallow 10 mLs in mouth every 6 hours as needed for mouth sores        PLAVIX PO      Take 75 mg by mouth daily        SPIRONOLACTONE PO      Take 12.5 mg by mouth daily

## 2018-07-10 ENCOUNTER — ALLIED HEALTH/NURSE VISIT (OUTPATIENT)
Dept: CARDIOLOGY | Facility: CLINIC | Age: 63
End: 2018-07-10
Attending: INTERNAL MEDICINE
Payer: MEDICARE

## 2018-07-10 DIAGNOSIS — I25.5 ISCHEMIC CARDIOMYOPATHY: Primary | ICD-10-CM

## 2018-07-10 PROCEDURE — 93296 REM INTERROG EVL PM/IDS: CPT | Mod: ZF

## 2018-07-10 PROCEDURE — 93295 DEV INTERROG REMOTE 1/2/MLT: CPT | Performed by: INTERNAL MEDICINE

## 2018-07-10 NOTE — MR AVS SNAPSHOT
After Visit Summary   7/10/2018    Matt Hester    MRN: [id number]           Patient Information     Date Of Birth          1955        Visit Information        Provider Department      7/10/2018 6:00 AM UC ICD REMOTE [facility]        Today's Diagnoses     Ischemic cardiomyopathy    -  1       Follow-ups after your visit        Your next 10 appointments already scheduled     Dec 06, 2018  3:15 PM CST   Lab with UC LAB   [facility]    [address] [place] [address]  1st Floor  [facility] [address] [phone]            Dec 06, 2018  3:30 PM CST   (Arrive by 3:15 PM)   Implanted Defibulator with Uc Cv Device 1   [facility]    [address] [place] [address]  [address] [place] [address] [phone]            Dec 06, 2018  4:00 PM CST   (Arrive by 3:45 PM)   RETURN HEART FAILURE with Kimberly Maynard MD   [facility]    [address] [place] [address]  [address] [place] [address] [phone]              Who to contact     If you have questions or need follow up information about today's clinic visit or your schedule please contact [facility] directly at [phone].  Normal or non-critical lab and imaging results will be communicated to you by PPLCONNECThart, letter or phone within 4 business days after the clinic has received the results. If you do not hear from us within 7 days, please contact the clinic through PPLCONNECThart or phone. If you have a critical or abnormal lab result, we will notify you by phone as soon as possible.  Submit refill requests through Tibersoft or call your pharmacy and they will forward the refill request to us. Please allow 3 business days for your refill to be completed.          Additional Information About Your Visit        PPLCONNECTharPin or Peg Information     Tibersoft gives you secure access to your electronic health record. If  you see a primary care provider, you can also send messages to your care team and make appointments. If you have questions, please call your primary care clinic.  If you do not have a primary care provider, please call [phone] and they will assist you.        Care EveryWhere ID     This is your Care EveryWhere ID. This could be used by other organizations to access your [facility] medical records  [id number]         Blood Pressure from Last 3 Encounters:   04/10/18 128/88   01/04/18 126/87   10/03/17 114/78    Weight from Last 3 Encounters:   04/10/18 73.5 kg (162 lb)   01/04/18 74.6 kg (164 lb 6.4 oz)   10/03/17 76.4 kg (168 lb 6.4 oz)              We Performed the Following     INTERROGATION DEVICE EVAL REMOTE, PACER/ICD        Primary Care Provider Office Phone # Fax #    Maykel Barber [phone]       [facility] [address] [place] [address]        Equal Access to Services     MARILYN ALICEA : Hadii aad ku hadasho Soomaali, waaxda luqadaha, qaybta kaalmada adeegyada, waxay idiin hayaan jolynn mcfadden . So [facility] [phone].    ATENCIÓN: Si habla español, tiene a castaneda disposición servicios gratuitos de asistencia lingüística. Llame al [phone].    We comply with applicable federal civil rights laws and [place] laws. We do not discriminate on the basis of race, color, national origin, age, disability, sex, sexual orientation, or gender identity.            Thank you!     Thank you for choosing [facility]  for your care. Our goal is always to provide you with excellent care. Hearing back from our patients is one way we can continue to improve our services. Please take a few minutes to complete the written survey that you may receive in the mail after your visit with us. Thank you!             Your Updated Medication List - Protect others around you: Learn how to safely use, store and throw away your medicines at www.disposemymeds.org.          This list is  accurate as of 7/10/18 11:59 PM.  Always use your most recent med list.                   Brand Name Dispense Instructions for use Diagnosis    carvedilol 12.5 MG tablet    COREG    270 tablet    Take 1.5 tablets (18.75 mg) by mouth 2 times daily (with meals)    Chronic systolic congestive heart failure (H)       CRESTOR PO      Take 40 mg by mouth daily        enalapril 5 MG tablet    VASOTEC    30 tablet    Take 1 tablet (5 mg) by mouth 2 times daily    Ischemic cardiomyopathy       LASIX PO      Take 20 mg by mouth        lidocaine visc 2% 2.5mL/5mL & maalox/mylanta w/ simeth 2.5mL/5mL & diphenhydrAMINE 5mg/5mL Susp suspension    [facility] Mouthwash [facility]     Swish and swallow 10 mLs in mouth every 6 hours as needed for mouth sores        PLAVIX PO      Take 75 mg by mouth daily        SPIRONOLACTONE PO      Take 12.5 mg by mouth daily

## 2018-07-25 NOTE — PROGRESS NOTES
Preliminary Device Interrogation Results.  Final physician signed paceart report to be scanned and attached.    Scheduled [employer], single chamber ICD, remote transmission received and reviewed. Device transmission sent per MD orders. His presenting rhythm is a regular ventricular rhythm @ 80 bpm. No arrhythmias recorded. Normal device function. = 0%. Lead trends appear stable. Battery estimates 5.5 years to CHARISSA. Pt notified of transmission results. Plan for pt to send another transmission in 3 months as scheduled.  Remote ICD transmission

## 2018-10-17 ENCOUNTER — ALLIED HEALTH/NURSE VISIT (OUTPATIENT)
Dept: CARDIOLOGY | Facility: CLINIC | Age: 63
End: 2018-10-17
Attending: INTERNAL MEDICINE
Payer: MEDICARE

## 2018-10-17 DIAGNOSIS — I25.5 ISCHEMIC CARDIOMYOPATHY: Primary | ICD-10-CM

## 2018-10-17 PROCEDURE — 93296 REM INTERROG EVL PM/IDS: CPT | Mod: ZF

## 2018-10-17 PROCEDURE — 93295 DEV INTERROG REMOTE 1/2/MLT: CPT | Performed by: INTERNAL MEDICINE

## 2018-10-17 NOTE — MR AVS SNAPSHOT
After Visit Summary   10/17/2018    Matt Hester    MRN: [id number]           Patient Information     Date Of Birth          1955        Visit Information        Provider Department      10/17/2018 6:00 AM UC ICD REMOTE [facility]        Today's Diagnoses     Ischemic cardiomyopathy    -  1       Follow-ups after your visit        Your next 10 appointments already scheduled     Dec 06, 2018  3:15 PM CST   Lab with UC LAB    [facility]    [address]  1st Floor  [facility] [id number] [phone]            Dec 06, 2018  3:30 PM CST   (Arrive by 3:15 PM)   Implanted Defibulator with Uc Cv Device 1   [facility]    [address] [facility]  3rd Floor  [address]               Dec 06, 2018  4:00 PM CST   (Arrive by 3:45 PM)   RETURN HEART FAILURE with Kimberly Maynard MD   [facility]    [address] [facility]  [address]  [facility] [id number] [phone]              Who to contact     If you have questions or need follow up information about today's clinic visit or your schedule please contact [facility] directly at [phone].  Normal or non-critical lab and imaging results will be communicated to you by Syncing.Nethart, letter or phone within 4 business days after the clinic has received the results. If you do not hear from us within 7 days, please contact the clinic through Syncing.Nethart or phone. If you have a critical or abnormal lab result, we will notify you by phone as soon as possible.  Submit refill requests through Linkage or call your pharmacy and they will forward the refill request to us. Please allow 3 business days for your refill to be completed.          Additional Information About Your Visit        Syncing.NetharSocial Game Universe Information     Linkage gives you secure access to your electronic health record. If you see a primary care  provider, you can also send messages to your care team and make appointments. If you have questions, please call your primary care clinic.  If you do not have a primary care provider, please call [phone] and they will assist you.        Care EveryWhere ID     This is your Care EveryWhere ID. This could be used by other organizations to access your [facility] medical records  [id number]         Blood Pressure from Last 3 Encounters:   04/10/18 128/88   01/04/18 126/87   10/03/17 114/78    Weight from Last 3 Encounters:   04/10/18 73.5 kg (162 lb)   01/04/18 74.6 kg (164 lb 6.4 oz)   10/03/17 76.4 kg (168 lb 6.4 oz)              We Performed the Following     (88761)INTERROGATION DEVICE EVAL REMOTE, PACER/ICD        Primary Care Provider Office Phone # Fax #    Maykel Barber [phone]       [facility] [address] [place] [address]        Equal Access to Services     CHAYO ALICEA : Hadii aad ku hadasho Soomaali, waaxda luqadaha, qaybta kaalmada adeegyada, waxay idiin hayaan jolynn mcfadden . So [facility] [phone].    ATENCIÓN: Si habla español, tiene a castaneda disposición servicios gratuitos de asistencia lingüística. Llame al [phone].    We comply with applicable federal civil rights laws and [place] laws. We do not discriminate on the basis of race, color, national origin, age, disability, sex, sexual orientation, or gender identity.            Thank you!     Thank you for choosing [facility]  for your care. Our goal is always to provide you with excellent care. Hearing back from our patients is one way we can continue to improve our services. Please take a few minutes to complete the written survey that you may receive in the mail after your visit with us. Thank you!             Your Updated Medication List - Protect others around you: Learn how to safely use, store and throw away your medicines at www.disposemymeds.org.          This list is accurate as of  10/17/18 11:59 PM.  Always use your most recent med list.                   Brand Name Dispense Instructions for use Diagnosis    carvedilol 12.5 MG tablet    COREG    270 tablet    Take 1.5 tablets (18.75 mg) by mouth 2 times daily (with meals)    Chronic systolic congestive heart failure (H)       CRESTOR PO      Take 40 mg by mouth daily        enalapril 5 MG tablet    VASOTEC    30 tablet    Take 1 tablet (5 mg) by mouth 2 times daily    Ischemic cardiomyopathy       LASIX PO      Take 20 mg by mouth        lidocaine visc 2% 2.5mL/5mL & maalox/mylanta w/ simeth 2.5mL/5mL & diphenhydrAMINE 5mg/5mL Susp suspension    [facility] M[facility]wash [facility]     Swish and swallow 10 mLs in mouth every 6 hours as needed for mouth sores        PLAVIX PO      Take 75 mg by mouth daily        SPIRONOLACTONE PO      Take 12.5 mg by mouth daily

## 2018-10-22 NOTE — PROGRESS NOTES
Preliminary Device Interrogation Results.  Final physician signed paceart report to be scanned and attached.    Remote ICD transmission received and reviewed.  Device transmission sent per MD orders.  Patient has a [email or web address] Teligen single lead ICD.  Normal ICD function.  3 non-treated episodes recorded @ rates 160-180's bpm and lasting 7-15 seconds. One of the 3 episodes show a distinct morphology change lasting nine beats. The other two episodes display an abrupt onset but morphology looks similar to presenting rhythm with a slight amplitude change.  Presenting EGM = Regular Vs@ 96 bpm.    = 0%.  Estimated battery longevity to CHARISSA = 5.5 years.  Patient notified of interrogation results.  Patient reports that he is feeling good and denies complaints.  Plan for patient to return to clinic December 6th 2018, as scheduled.    Remote ICD transmission

## 2018-11-29 DIAGNOSIS — I50.22 CHRONIC SYSTOLIC CONGESTIVE HEART FAILURE (H): Primary | ICD-10-CM

## 2018-12-06 ENCOUNTER — ALLIED HEALTH/NURSE VISIT (OUTPATIENT)
Dept: CARDIOLOGY | Facility: CLINIC | Age: 63
End: 2018-12-06
Attending: INTERNAL MEDICINE
Payer: MEDICARE

## 2018-12-06 VITALS
HEART RATE: 81 BPM | DIASTOLIC BLOOD PRESSURE: 77 MMHG | OXYGEN SATURATION: 97 % | HEIGHT: 67 IN | BODY MASS INDEX: 22.76 KG/M2 | SYSTOLIC BLOOD PRESSURE: 118 MMHG | WEIGHT: 145 LBS

## 2018-12-06 DIAGNOSIS — I25.5 ISCHEMIC CARDIOMYOPATHY: ICD-10-CM

## 2018-12-06 DIAGNOSIS — I50.22 CHRONIC SYSTOLIC CONGESTIVE HEART FAILURE (H): Primary | ICD-10-CM

## 2018-12-06 DIAGNOSIS — I50.22 CHRONIC SYSTOLIC CONGESTIVE HEART FAILURE (H): ICD-10-CM

## 2018-12-06 LAB
ALBUMIN SERPL-MCNC: 3.2 G/DL (ref 3.4–5)
ALP SERPL-CCNC: 77 U/L (ref 40–150)
ALT SERPL W P-5'-P-CCNC: 14 U/L (ref 0–70)
ANION GAP SERPL CALCULATED.3IONS-SCNC: 7 MMOL/L (ref 3–14)
AST SERPL W P-5'-P-CCNC: 9 U/L (ref 0–45)
BILIRUB SERPL-MCNC: 0.6 MG/DL (ref 0.2–1.3)
BUN SERPL-MCNC: 7 MG/DL (ref 7–30)
CALCIUM SERPL-MCNC: 8.4 MG/DL (ref 8.5–10.1)
CHLORIDE SERPL-SCNC: 100 MMOL/L (ref 94–109)
CHOLEST SERPL-MCNC: 122 MG/DL
CO2 SERPL-SCNC: 30 MMOL/L (ref 20–32)
CREAT SERPL-MCNC: 0.9 MG/DL (ref 0.66–1.25)
GFR SERPL CREATININE-BSD FRML MDRD: 85 ML/MIN/1.7M2
GLUCOSE SERPL-MCNC: 88 MG/DL (ref 70–99)
HDLC SERPL-MCNC: 40 MG/DL
LDLC SERPL CALC-MCNC: 58 MG/DL
NONHDLC SERPL-MCNC: 82 MG/DL
POTASSIUM SERPL-SCNC: 3.3 MMOL/L (ref 3.4–5.3)
PROT SERPL-MCNC: 7 G/DL (ref 6.8–8.8)
SODIUM SERPL-SCNC: 137 MMOL/L (ref 133–144)
TRIGL SERPL-MCNC: 120 MG/DL

## 2018-12-06 PROCEDURE — 36415 COLL VENOUS BLD VENIPUNCTURE: CPT | Performed by: INTERNAL MEDICINE

## 2018-12-06 PROCEDURE — 80061 LIPID PANEL: CPT | Performed by: INTERNAL MEDICINE

## 2018-12-06 PROCEDURE — 99214 OFFICE O/P EST MOD 30 MIN: CPT | Mod: ZP | Performed by: INTERNAL MEDICINE

## 2018-12-06 PROCEDURE — G0463 HOSPITAL OUTPT CLINIC VISIT: HCPCS | Mod: 25,ZF

## 2018-12-06 PROCEDURE — 80053 COMPREHEN METABOLIC PANEL: CPT | Performed by: INTERNAL MEDICINE

## 2018-12-06 RX ORDER — POTASSIUM CHLORIDE 1500 MG/1
20 TABLET, EXTENDED RELEASE ORAL DAILY
Qty: 90 TABLET | Refills: 3 | Status: SHIPPED | OUTPATIENT
Start: 2018-12-06 | End: 2020-04-09

## 2018-12-06 ASSESSMENT — PAIN SCALES - GENERAL: PAINLEVEL: NO PAIN (0)

## 2018-12-06 NOTE — NURSING NOTE
Chief Complaint   Patient presents with     Follow Up For     Chronic systolic congestive heart failure     Medications reviewed and vitals performed.  Fabiola Feldman CMA

## 2018-12-06 NOTE — LETTER
"2018      RE: Matt Hester  [address]  [facility] [address]       Dear Colleague,    Thank you for the opportunity to participate in the care of your patient, Matt Hester, at the [facility] HEART CARE at [facility]. Please see a copy of my visit note below.    [facility]    2018    RE: Matt Hester   MRN: [id number]   : 1955      Dear Colleagues:        We had the pleasure of seeing Mr. Matt Hester in the [facility] Advanced Heart Failure Clinic on 2018.  As you know, he is a very pleasant, 62-year-old man with a long-standing history of ischemic cardiomyopathy, and I will detail his cardiac history below.  He has been followed in [place] and was previously seeing Jun Velazquez as well as one of our nurse practitioners, who has now moved here, Jeanie Hernandez.  When he was first diagnosed, his ejection fraction was in the 10%-15% range.  He has now been stable in the 25% range with \"an excellent quality of life\" for many years.      His cardiac history is summarized as follows:  His first MI was in .  In , he had an anterior wall MI requiring stenting.  He had a single-chamber ICD implanted in 2004 for primary prevention and a generator change on 2010 and again in 2011.  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  He has regained function of his left arm but his visual deficit persists.    Today he continues to feel quite well. He has lost about 10 -15 lbs in the past 2 years due to eating better (less calories) and getting a little more exercise. 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 Lost vision in his left eye at the time of his stroke. No orthopnea, PND, chest pain, " palpitations, syncope, ICD shocks. No hospitalizations since I saw him last.        PAST MEDICAL HISTORY:  Past Medical History   Diagnosis Date     Chronic systolic congestive heart failure (H)      Ischemic cardiomyopathy      ICD (implantable cardioverter-defibrillator) in place      HTN (hypertension)      Hyperlipidemia      Peripheral vascular disease (H)      CVA (cerebral vascular accident) (H)      Atrial fibrillation (H), paroxysmal         Other medical history includes   - colonic polyp in 11/2010   - atrophic gastritis  -  Haro esophagus   - benign neoplasm of the colon in 2011   - chronic abdominal pain  -  diverticulosis, bile duct hematoma per liver biopsy,  - colectomy 01/05/2011  - a stroke in 10/2011   - right occipital residual vision changes  - Hypertension   - nasal polyp   - ICD placement in 06/2011 for primary prevention.      ALLERGIES:     1.  Aggrenox, severe nausea.   2.  Alcohol, hives.   3.  Effient, rash and itching.   4.  Demerol, itching, nausea and vomiting.   5.  Flagyl.   6.  Levaquin.     SOCIAL HISTORY:  The patient works full-time in the construction business.  He does not drink, smoke or use drugs, and he never has.  [place] of [place].  He has 1 son and a grandson that he is very close with.     CURRENT MEDICATIONS:  Current Outpatient Prescriptions   Medication Sig Dispense Refill     carvedilol (COREG) 12.5 MG tablet Take 1.5 tablets (18.75 mg) by mouth 2 times daily (with meals) 270 tablet 3     Clopidogrel Bisulfate (PLAVIX PO) Take 75 mg by mouth daily       enalapril (VASOTEC) 5 MG tablet Take 1 tablet (5 mg) by mouth 2 times daily 30 tablet 1     Furosemide (LASIX PO) Take 20 mg by mouth       Magic Mouthwash (FV std formula) lidocaine visc 2% 2.5mL/5mL & maalox/mylanta w/ simeth 2.5mL/5mL & diphenhydrAMINE 5mg/5mL Swish and swallow 10 mLs in mouth every 6 hours as needed for mouth sores       Rosuvastatin Calcium (CRESTOR PO) Take 40 mg by mouth  "daily       SPIRONOLACTONE PO Take 12.5 mg by mouth daily         ROS:     Constitutional: No fever, chills, or sweats. Intentional weight loss 15 lbs.   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  No ear ache, epistaxis, sore throat.   Allergies/Immunologic: Negative.   Respiratory: No cough, hemoptysis.   Cardiovascular: As per HPI.   GI: No nausea, vomiting, hematemesis, melena, or hematochezia.  : No urinary frequency, dysuria, or hematuria.   Integument: Negative.   Psychiatric: Negative.   Neuro: Negative.   Endocrinology: Negative.   Musculoskeletal: Negative.    EXAM:  /77 (BP Location: Left arm, Patient Position: Chair, Cuff Size: Adult Regular)  Pulse 81  Ht 1.702 m (5' 7\")  Wt 65.8 kg (145 lb)  SpO2 97%  BMI 22.71 kg/m2  General: appears comfortable, alert and articulate  Head: normocephalic, atraumatic  Eyes: anicteric sclera, EOMI  Neck: no adenopathy  Orophyarynx: moist mucosa, no lesions, dentition intact  Heart: regular, S1/S2, no murmur, gallop, rub, estimated JVP  <8 without HJR  Lungs: clear, no rales or wheezing  Abdomen: soft, non-tender, bowel sounds present, no hepatosplenomegaly  Extremities: no clubbing, cyanosis or edema  Neurological: normal speech and affect, no gross motor deficits    Labs:  CBC RESULTS:  Lab Results   Component Value Date    WBC 7.1 07/21/2016    RBC 5.37 07/21/2016    HGB 15.0 07/21/2016    HCT 46.5 07/21/2016    MCV 87 07/21/2016    MCH 27.9 07/21/2016    MCHC 32.3 07/21/2016    RDW 14.0 07/21/2016     07/21/2016       CMP RESULTS:  Lab Results   Component Value Date     04/10/2018    POTASSIUM 3.9 04/10/2018    CHLORIDE 102 04/10/2018    CO2 29 04/10/2018    ANIONGAP 4 04/10/2018    GLC 98 04/10/2018    BUN 7 04/10/2018    CR 0.93 04/10/2018    GFRESTIMATED 82 04/10/2018    GFRESTBLACK >90 04/10/2018    JD 8.8 04/10/2018    BILITOTAL 0.6 07/21/2016    ALBUMIN 3.4 07/21/2016    ALKPHOS 59 07/21/2016    ALT 17 07/21/2016    AST 13 07/21/2016      INR " RESULTS:  Lab Results   Component Value Date    INR 1.00 07/21/2016     Lab Results   Component Value Date    MAG 2.3 07/21/2016     No results found for: NTBNPI  Lab Results   Component Value Date    NTBNP 318 (H) 07/21/2016     Coronary Angiogram 2011  1. Widely patent stents in the ramus intermedius and LAD.   2. Two-vessel coronary artery disease.  3. Moderate left ventricular chamber enlargement.   4. Left ventricular systolic function is severely reduced with a visually estimated ejection fraction of 15 to 20% and global hypokinesis.   5. Anterior apical and inferior akinesis.   6. Possible left ventricular thrombus.     Right heart catheterization from 01/24/2014 shows an RA of 8, RV 30/8, PA 29/16 with a wedge of 11, cardiac output 3.95 L/min, body surface area of 1.87 giving a normal cardiac index, PA saturation of 76%, normal pulmonary vascular resistance.    Echocardiogram 4/2018         Interpretation Summary  Left ventricular size is normal.Severely (EF 26%) reduced left ventricular  function is present.There is apical akinesis with severe diffuse  hypokinesis.Global right ventricular function is normal.  No significant valvular disease present.  The inferior vena cava was normal in size with preserved respiratory  variability.  No pericardial effusion present.     Previous study not available for comparison.       from 05/14/2016 shows LV size is normal.  Qualitative ejection fraction is 25%-30%.  There is no mural thrombus.  Apical akinesis.  Severe global hypokinesis of the left ventricle.  Pacemaker noted within the right ventricle.  No atrial septal defect.  I do not have LV dimensions or any valves reported on with this report.  Single-chamber ICD function today shows 1 episode of 9 beats of nonsustained VT.  No tachy therapies.      Last stress test with a Lexiscan 12/30/2013:  Small territories of periinfarct reversibility and 2 large territories of fixed defects, severely reduced EF of 25%.  CT  angio of the neck:  Mild atherosclerosis, no significant narrowing or dilation of intracranial circulation.  This was performed on 05/14/2016.       ICD interrogation today: Patient seen in [facility] for evaluation and iterative programming of his [employer] single lead ICD per MD orders.  Patient has an appointment to see Dr. Maynard today.  Normal ICD function.  3 asymptomatic non-sustained high ventricular rate episodes recorded since 4/10/18 lasting between 4-17 beats in duration with rates between 161-195 bpm.  The morphology of these match his intrinsic morphology.   Intrinsic rhythm = SR @ 75 bpm.    = <1%.   Estimated battery longevity to CHARISSA = 5.5 years.   Lead trends appear stable.  Patient reports that he is feeling well and denies complaints.  Plan for patient to send a remote transmission in 3 months and return to clinic in 6 months.    Assessment and Plan:   In summary, this is a very pleasant, 62-year-old man with a long-standing history of ischemic cardiomyopathy with an ejection fraction in the 25% range who has been stable on medical therapy for many years with an ICD for primary prevention.  Today, he appears euvolemic without evidence of volume overload and has been without any heart failure hospitalizations.  He is NYHA class I, stage C today.  He is on an excellent medication regimen including high dose statin, Crestor 40 mg daily, enalapril 5 mg twice daily, a full dose aspirin, coreg, aldactone and Plavix (indefinitely after CVA). His BMP today is unremarkable.      Should he develop symptoms may next move would be to change him from enalapril to Entresto.     With regard to prevention of sudden cardiac death, his defibrillator.  He does not meet criteria for CRT. He will do a remote check tonight.     With regards to his history of stroke, he has had an echocardiogram since this last possible TIA, which did not show an LV thrombus or shunt.  He had a repeat Ziopatch, which did not show  any atrial fibrillation; therefore, he does not need anticoagulation. He is on a full dose Aspirin and Plavix for his history of coronary disease as detailed above.    Chronic systolic heart failure secondary to coronary artery disease.  Ischemic cardiomyopathy.  Stage C  NYHA Class I  ACEi/ARB yes  BB yes  Aldosterone antagonist yes  SCD prophylaxis ICD  % BiV pacing: N/A  Fluid status euvolemic  NSAID use: none     RTC 1 year -sooner as needed      Kimberly Maynard MD   of Medicine   [facility] Division of Cardiology         CC  Patient Care Team:  Maykel Barber MD as PCP - General (Internal Medicine)  Kimberly Maynard MD as MD (Cardiology)  Savana Renae, RN as Nurse Coordinator (Cardiology)  Edel Hernandez APRN CNP as Nurse Practitioner (Nurse Practitioner - Gerontology)  Annmarie Ruelas, RN as Nurse Coordinator (Cardiology)  EDEL HERNANDEZ

## 2018-12-06 NOTE — PATIENT INSTRUCTIONS
Cardiology Providers you saw during your visit:  Dr. Maynard    Medication changes:      Follow up:      Please return to clinic for follow-up:  With Dr. Maynard in       Please call if you have :  1. Weight gain of more than 2 pounds in a day or 5 pounds in a week  2. Increased shortness of breath, swelling or bloating  3. Dizziness, lightheadedness   4. Any questions or concerns.       Follow the American Heart Association Diet and Lifestyle recommendations:  Limit saturated fat, trans fat, sodium, red meat, sweets and sugar-sweetened beverages. If you choose to eat red meat, compare labels and select the leanest cuts available.  Aim for at least 150 minutes of moderate physical activity or 75 minutes of vigorous physical activity - or an equal combination of both - each week.      During business hours: [phone], press option # 1 to be routed to the [facility] then option # 3 for medical questions to speak with a nurse      After hours, weekends or holidays: On Call Cardiologist- [phone]   option #4 and ask to speak to the on-call Cardiologist. Inform them you are a CORE/heart failure patient at the [facility].      Annmarie Ruelas, RN  Cardiology Nurse Care Coordinator    Keep up the good work!    Take Care!

## 2018-12-06 NOTE — PATIENT INSTRUCTIONS
It was a pleasure to see you in clinic today.  Please do not hesitate to call with any questions or concerns.  You are scheduled for a remote transmission on 3/12/19.  We look forward to seeing you in clinic at your next device check in 6 months.    Kerrie Mason RN, BSN  Electrophysiology Nurse Clinician  [facility] Heart Care    During Business Hours Please Call:  [phone]  After Hours Please Call:  [phone] - select option #4 and ask for job code [id number]

## 2018-12-06 NOTE — PROGRESS NOTES
Preliminary Device Interrogation Results.  Final physician signed paceart report to be scanned and attached.    Patient seen in [facility] for evaluation and iterative programming of his [employer] single lead ICD per MD orders.  Patient has an appointment to see Dr. Maynard today.  Normal ICD function.  3 asymptomatic non-sustained high ventricular rate episodes recorded since 4/10/18 lasting between 4-17 beats in duration with rates between 161-195 bpm.  The morphology of these match his intrinsic morphology.   Intrinsic rhythm = SR @ 75 bpm.    = <1%.   Estimated battery longevity to CHARISSA = 5.5 years.   Lead trends appear stable.  Patient reports that he is feeling well and denies complaints.  Plan for patient to send a remote transmission in 3 months and return to clinic in 6 months.    Single lead ICD

## 2018-12-06 NOTE — MR AVS SNAPSHOT
After Visit Summary   12/6/2018    Matt Hester    MRN: [id number]           Patient Information     Date Of Birth          1955        Visit Information        Provider Department      12/6/2018 3:30 PM  CV DEVICE 1 [facility]        Today's Diagnoses     Ischemic cardiomyopathy          Care Instructions    It was a pleasure to see you in clinic today.  Please do not hesitate to call with any questions or concerns.  You are scheduled for a remote transmission on 3/12/19.  We look forward to seeing you in clinic at your next device check in 6 months.    Kerrie Mason RN, BSN  Electrophysiology Nurse Clinician  [facility] Heart Care    During Business Hours Please Call:  [phone]  After Hours Please Call:  [phone] - select option #4 and ask for job code [id number]          Follow-ups after your visit        Your next 10 appointments already scheduled     Mar 12, 2019 12:00 AM CDT   CARDIAC DEVICE CHECK - REMOTE with  ICD REMOTE   [facility] ([facility])    [address] [place] [id number] [phone]              Future tests that were ordered for you today     Open Future Orders        Priority Expected Expires Ordered    Basic metabolic panel Routine 12/6/2019 12/6/2019 12/6/2018    Follow-Up with Advanced Heart Failure Clinic Routine 12/6/2019 12/6/2019 12/6/2018    N terminal pro BNP outpatient Routine 12/6/2019 12/6/2019 12/6/2018    Echocardiogram Routine 12/6/2019 12/6/2019 12/6/2018            Who to contact     Please call your clinic at No information on file. to:    Ask questions about your health    Make or cancel appointments    Discuss your medicines    Learn about your test results    Speak to your doctor            Additional Information About Your Visit        MyChart Information     [employer] gives you secure access to your electronic health record. If you see a primary care provider, you  can also send messages to your care team and make appointments. If you have questions, please call your primary care clinic.  If you do not have a primary care provider, please call [phone] and they will assist you.      [employer] is an electronic gateway that provides easy, online access to your medical records. With [employer], you can request a clinic appointment, read your test results, renew a prescription or communicate with your care team.     To access your existing account, please contact your [facility] or call [phone] for assistance.        Care EveryWhere ID     This is your Care EveryWhere ID. This could be used by other organizations to access your [facility] medical records  [id number]         Blood Pressure from Last 3 Encounters:   12/06/18 118/77   04/10/18 128/88   01/04/18 126/87    Weight from Last 3 Encounters:   12/06/18 65.8 kg (145 lb)   04/10/18 73.5 kg (162 lb)   01/04/18 74.6 kg (164 lb 6.4 oz)              We Performed the Following     Follow-Up with Device Clinic-6 months     ICD DEVICE PROGRAMMING EVAL, SINGLE LEAD ICD          Today's Medication Changes          These changes are accurate as of 12/6/18  6:47 PM.  If you have any questions, ask your nurse or doctor.               Start taking these medicines.        Dose/Directions    potassium chloride ER 20 MEQ CR tablet   Commonly known as:  K-DUR/KLOR-CON M   Used for:  Chronic systolic congestive heart failure (H)   Started by:  Kimberly Maynard MD        Dose:  20 mEq   Take 1 tablet (20 mEq) by mouth daily   Quantity:  90 tablet   Refills:  3            Where to get your medicines      These medications were sent to [facility] [id number] [place] [facility] [place] [place] - [address]  [address] [facility] [address]     Phone:  [phone]     potassium chloride ER 20 MEQ CR tablet                Primary Care Provider Office Phone # Fax #    Maykel Barber [phone]  [phone]       [facility] [address] [facility] [address]        Equal Access to Services     CHAYO ALICEA : Hadii bon thomas wilma Moss, wamalinada luqmadalyn, coltta kasavannahda aura, jeanie nanciin hayaasavage craigrick dubois bogdan barragan. So [facility] [phone].    ATENCIÓN: Si habla español, tiene a castaneda disposición servicios gratuitos de asistencia lingüística. Gabrielle al [phone].    We comply with applicable federal civil rights laws and [place] laws. We do not discriminate on the basis of race, color, national origin, age, disability, sex, sexual orientation, or gender identity.            Thank you!     Thank you for choosing [facility]  for your care. Our goal is always to provide you with excellent care. Hearing back from our patients is one way we can continue to improve our services. Please take a few minutes to complete the written survey that you may receive in the mail after your visit with us. Thank you!             Your Updated Medication List - Protect others around you: Learn how to safely use, store and throw away your medicines at www.disposemymeds.org.          This list is accurate as of 12/6/18  6:47 PM.  Always use your most recent med list.                   Brand Name Dispense Instructions for use Diagnosis    carvedilol 12.5 MG tablet    COREG    270 tablet    Take 1.5 tablets (18.75 mg) by mouth 2 times daily (with meals)    Chronic systolic congestive heart failure (H)       CRESTOR PO      Take 40 mg by mouth daily        enalapril 5 MG tablet    VASOTEC    30 tablet    Take 1 tablet (5 mg) by mouth 2 times daily    Ischemic cardiomyopathy       LASIX PO      Take 20 mg by mouth        lidocaine visc 2% 2.5mL/5mL & maalox/mylanta w/ simeth 2.5mL/5mL & diphenhydrAMINE 5mg/5mL Susp suspension    [facility] Mouthwash [facility]     Swish and swallow 10 mLs in mouth every 6 hours as needed for mouth sores        lidocaine VISCOUS 2 % solution    XYLOCAINE          PLAVIX PO      Take 75 mg  by mouth daily        potassium chloride ER 20 MEQ CR tablet    K-DUR/KLOR-CON M    90 tablet    Take 1 tablet (20 mEq) by mouth daily    Chronic systolic congestive heart failure (H)       SPIRONOLACTONE PO      Take 12.5 mg by mouth daily

## 2018-12-06 NOTE — PROGRESS NOTES
"[facility]    2018    RE: Matt Hester   MRN: [id number]   : 1955      Dear Colleagues:        We had the pleasure of seeing Mr. Matt Hester in the [facility] Failure [facility] on 2018.  As you know, he is a very pleasant, 62-year-old man with a long-standing history of ischemic cardiomyopathy, and I will detail his cardiac history below.  He has been followed in [place] and was previously seeing Jun Velazquez as well as one of our nurse practitioners, who has now moved here, Jeanie Hernandez.  When he was first diagnosed, his ejection fraction was in the 10%-15% range.  He has now been stable in the 25% range with \"an excellent quality of life\" for many years.      His cardiac history is summarized as follows:  His first MI was in .  In , he had an anterior wall MI requiring stenting.  He had a single-chamber ICD implanted in 2004 for primary prevention and a generator change on 2010 and again in 2011.  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  He has regained function of his left arm but his visual deficit persists.    Today he continues to feel quite well. He has lost about 10 -15 lbs in the past 2 years due to eating better (less calories) and getting a little more exercise. 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 Lost vision in his left eye at the time of his stroke. No orthopnea, PND, chest pain, palpitations, syncope, ICD shocks. No hospitalizations since I saw him last.        PAST MEDICAL HISTORY:  Past Medical History   Diagnosis Date     Chronic systolic congestive heart failure (H)      Ischemic cardiomyopathy      ICD (implantable cardioverter-defibrillator) in place      HTN (hypertension)      " Hyperlipidemia      Peripheral vascular disease (H)      CVA (cerebral vascular accident) (H)      Atrial fibrillation (H), paroxysmal         Other medical history includes   - colonic polyp in 11/2010   - atrophic gastritis  -  Haro esophagus   - benign neoplasm of the colon in 2011   - chronic abdominal pain  -  diverticulosis, bile duct hematoma per liver biopsy,  - colectomy 01/05/2011  - a stroke in 10/2011   - right occipital residual vision changes  - Hypertension   - nasal polyp   - ICD placement in 06/2011 for primary prevention.      ALLERGIES:     1.  Aggrenox, severe nausea.   2.  Alcohol, hives.   3.  Effient, rash and itching.   4.  Demerol, itching, nausea and vomiting.   5.  Flagyl.   6.  Levaquin.     SOCIAL HISTORY:  The patient works full-time in the construction business.  He does not drink, smoke or use drugs, and he never has.  [place].  He has 1 son and a grandson that he is very close with.     CURRENT MEDICATIONS:  Current Outpatient Prescriptions   Medication Sig Dispense Refill     carvedilol (COREG) 12.5 MG tablet Take 1.5 tablets (18.75 mg) by mouth 2 times daily (with meals) 270 tablet 3     Clopidogrel Bisulfate (PLAVIX PO) Take 75 mg by mouth daily       enalapril (VASOTEC) 5 MG tablet Take 1 tablet (5 mg) by mouth 2 times daily 30 tablet 1     Furosemide (LASIX PO) Take 20 mg by mouth       Magic Mouthwash (FV std formula) lidocaine visc 2% 2.5mL/5mL & maalox/mylanta w/ simeth 2.5mL/5mL & diphenhydrAMINE 5mg/5mL Swish and swallow 10 mLs in mouth every 6 hours as needed for mouth sores       Rosuvastatin Calcium (CRESTOR PO) Take 40 mg by mouth daily       SPIRONOLACTONE PO Take 12.5 mg by mouth daily         ROS:     Constitutional: No fever, chills, or sweats. Intentional weight loss 15 lbs.   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  No ear ache, epistaxis, sore throat.   Allergies/Immunologic: Negative.   Respiratory: No cough, hemoptysis.   Cardiovascular: As  "per HPI.   GI: No nausea, vomiting, hematemesis, melena, or hematochezia.  : No urinary frequency, dysuria, or hematuria.   Integument: Negative.   Psychiatric: Negative.   Neuro: Negative.   Endocrinology: Negative.   Musculoskeletal: Negative.    EXAM:  /77 (BP Location: Left arm, Patient Position: Chair, Cuff Size: Adult Regular)  Pulse 81  Ht 1.702 m (5' 7\")  Wt 65.8 kg (145 lb)  SpO2 97%  BMI 22.71 kg/m2  General: appears comfortable, alert and articulate  Head: normocephalic, atraumatic  Eyes: anicteric sclera, EOMI  Neck: no adenopathy  Orophyarynx: moist mucosa, no lesions, dentition intact  Heart: regular, S1/S2, no murmur, gallop, rub, estimated JVP  <8 without HJR  Lungs: clear, no rales or wheezing  Abdomen: soft, non-tender, bowel sounds present, no hepatosplenomegaly  Extremities: no clubbing, cyanosis or edema  Neurological: normal speech and affect, no gross motor deficits    Labs:  CBC RESULTS:  Lab Results   Component Value Date    WBC 7.1 07/21/2016    RBC 5.37 07/21/2016    HGB 15.0 07/21/2016    HCT 46.5 07/21/2016    MCV 87 07/21/2016    MCH 27.9 07/21/2016    MCHC 32.3 07/21/2016    RDW 14.0 07/21/2016     07/21/2016       CMP RESULTS:  Lab Results   Component Value Date     04/10/2018    POTASSIUM 3.9 04/10/2018    CHLORIDE 102 04/10/2018    CO2 29 04/10/2018    ANIONGAP 4 04/10/2018    GLC 98 04/10/2018    BUN 7 04/10/2018    CR 0.93 04/10/2018    GFRESTIMATED 82 04/10/2018    GFRESTBLACK >90 04/10/2018    JD 8.8 04/10/2018    BILITOTAL 0.6 07/21/2016    ALBUMIN 3.4 07/21/2016    ALKPHOS 59 07/21/2016    ALT 17 07/21/2016    AST 13 07/21/2016      INR RESULTS:  Lab Results   Component Value Date    INR 1.00 07/21/2016     Lab Results   Component Value Date    MAG 2.3 07/21/2016     No results found for: NTBNPI  Lab Results   Component Value Date    [facility] 318 (H) 07/21/2016     Coronary Angiogram 2011  1. Widely patent stents in the ramus intermedius and LAD.   2. " Two-vessel coronary artery disease.  3. Moderate left ventricular chamber enlargement.   4. Left ventricular systolic function is severely reduced with a visually estimated ejection fraction of 15 to 20% and global hypokinesis.   5. Anterior apical and inferior akinesis.   6. Possible left ventricular thrombus.     Right heart catheterization from 01/24/2014 shows an RA of 8, RV 30/8, PA 29/16 with a wedge of 11, cardiac output 3.95 L/min, body surface area of 1.87 giving a normal cardiac index, PA saturation of 76%, normal pulmonary vascular resistance.    Echocardiogram 4/2018         Interpretation Summary  Left ventricular size is normal.Severely (EF 26%) reduced left ventricular  function is present.There is apical akinesis with severe diffuse  hypokinesis.Global right ventricular function is normal.  No significant valvular disease present.  The inferior vena cava was normal in size with preserved respiratory  variability.  No pericardial effusion present.     Previous study not available for comparison.       from 05/14/2016 shows LV size is normal.  Qualitative ejection fraction is 25%-30%.  There is no mural thrombus.  Apical akinesis.  Severe global hypokinesis of the left ventricle.  Pacemaker noted within the right ventricle.  No atrial septal defect.  I do not have LV dimensions or any valves reported on with this report.  Single-chamber ICD function today shows 1 episode of 9 beats of nonsustained VT.  No tachy therapies.      Last stress test with a Lexiscan 12/30/2013:  Small territories of periinfarct reversibility and 2 large territories of fixed defects, severely reduced EF of 25%.  CT angio of the neck:  Mild atherosclerosis, no significant narrowing or dilation of intracranial circulation.  This was performed on 05/14/2016.       ICD interrogation today: Patient seen in [facility] for evaluation and iterative programming of his [employer] single lead ICD per MD orders.  Patient has an  appointment to see Dr. Maynard today.  Normal ICD function.  3 asymptomatic non-sustained high ventricular rate episodes recorded since 4/10/18 lasting between 4-17 beats in duration with rates between 161-195 bpm.  The morphology of these match his intrinsic morphology.   Intrinsic rhythm = SR @ 75 bpm.    = <1%.   Estimated battery longevity to CHARISSA = 5.5 years.   Lead trends appear stable.  Patient reports that he is feeling well and denies complaints.  Plan for patient to send a remote transmission in 3 months and return to clinic in 6 months.    Assessment and Plan:   In summary, this is a very pleasant, 62-year-old man with a long-standing history of ischemic cardiomyopathy with an ejection fraction in the 25% range who has been stable on medical therapy for many years with an ICD for primary prevention.  Today, he appears euvolemic without evidence of volume overload and has been without any heart failure hospitalizations.  He is NYHA class I, stage C today.  He is on an excellent medication regimen including high dose statin, Crestor 40 mg daily, enalapril 5 mg twice daily, a full dose aspirin, coreg, aldactone and Plavix (indefinitely after CVA). His BMP today is unremarkable.      Should he develop symptoms may next move would be to change him from enalapril to Entresto.     With regard to prevention of sudden cardiac death, his defibrillator.  He does not meet criteria for CRT. He will do a remote check tonight.     With regards to his history of stroke, he has had an echocardiogram since this last possible TIA, which did not show an LV thrombus or shunt.  He had a repeat Ziopatch, which did not show any atrial fibrillation; therefore, he does not need anticoagulation. He is on a full dose Aspirin and Plavix for his history of coronary disease as detailed above.    Chronic systolic heart failure secondary to coronary artery disease.  Ischemic cardiomyopathy.  Stage C  NYHA Class I  ACEi/ARB yes  BB  yes  Aldosterone antagonist yes  SCD prophylaxis ICD  % BiV pacing: N/A  Fluid status euvolemic  NSAID use: none     RTC 1 year -sooner as needed      Kimberly Maynard MD   of Medicine   [facility] Division of Cardiology         CC  Patient Care Team:  Maykel Barber MD as PCP - General (Internal Medicine)  Kimberly Maynard MD as MD (Cardiology)  Savana Renae, RN as Nurse Coordinator (Cardiology)  Edel Hernandez, NEEMA CNP as Nurse Practitioner (Nurse Practitioner - Gerontology)  Annmarie Ruelas, ALETA as Nurse Coordinator (Cardiology)  EDEL HERNANDEZ

## 2018-12-06 NOTE — MR AVS SNAPSHOT
MRN:[id number]                      After Visit Summary   12/6/2018    Matt Hester    MRN: [id number]           Visit Information        Provider Department      12/6/2018 3:30 PM UC CV DEVICE 1 [facility]        Your next 10 appointments already scheduled     Dec 06, 2018  4:00 PM CST   (Arrive by 3:45 PM)   RETURN HEART FAILURE with Kimberly Maynard MD   [facility] ([facility])    [address] [place] [address] Se  [address] [place] [address] [id number] [phone]            Mar 12, 2019 12:00 AM CDT   CARDIAC DEVICE CHECK - REMOTE with UC ICD REMOTE   [facility] ([facility])    [address] [place] [address]  [address]  [facility] [address] [phone]              Care Instructions    It was a pleasure to see you in clinic today.  Please do not hesitate to call with any questions or concerns.  You are scheduled for a remote transmission on 3/12/19.  We look forward to seeing you in clinic at your next device check in 6 months.    Kerrie Mason RN, BSN  Electrophysiology Nurse Clinician  [facility]    During Business Hours Please Call:  [phone]  After Hours Please Call:  [phone] - select option #4 and ask for job code [phone]         [employer] Information     [employer] gives you secure access to your electronic health record. If you see a primary care provider, you can also send messages to your care team and make appointments. If you have questions, please call your primary care clinic.  If you do not have a primary care provider, please call [phone] and they will assist you.      [employer] is an electronic gateway that provides easy, online access to your medical records. With [employer], you can request a clinic appointment, read your test results, renew a prescription or communicate with your care team.     To access your existing account, please contact your [facility] or call [phone] for assistance.        Care EveryWhere ID     This is your Care EveryWhere ID. This could be used by other organizations to access your [place] medical records  [id number]        Equal Access to Services     CHAYO ALICEA : Uri Moss, wadestin tovar, qatwin kaaljose antonio chavarria, jeanie barragan. So [facility] [phone].    ATENCIÓN: Si habla español, tiene a castaneda disposición servicios gratuitos de asistencia lingüística. Llame al [phone].    We comply with applicable federal civil rights laws and [place] laws. We do not discriminate on the basis of race, color, national origin, age, disability, sex, sexual orientation, or gender identity.

## 2018-12-06 NOTE — MR AVS SNAPSHOT
After Visit Summary   12/6/2018    Matt Hester    MRN: [id number]           Patient Information     Date Of Birth          1955        Visit Information        Provider Department      12/6/2018 4:00 PM Kimberly Maynard MD [facility]        Today's Diagnoses     Chronic systolic congestive heart failure (H)    -  1      Care Instructions    Cardiology Providers you saw during your visit:  Dr. Maynard    Medication changes:      Follow up:      Please return to clinic for follow-up:  With Dr. Maynard in       Please call if you have :  1. Weight gain of more than 2 pounds in a day or 5 pounds in a week  2. Increased shortness of breath, swelling or bloating  3. Dizziness, lightheadedness   4. Any questions or concerns.       Follow the American Heart Association Diet and Lifestyle recommendations:  Limit saturated fat, trans fat, sodium, red meat, sweets and sugar-sweetened beverages. If you choose to eat red meat, compare labels and select the leanest cuts available.  Aim for at least 150 minutes of moderate physical activity or 75 minutes of vigorous physical activity - or an equal combination of both - each week.      During business hours: [phone], press option # 1 to be routed to the [facility] then option # 3 for medical questions to speak with a nurse      After hours, weekends or holidays: On Call Cardiologist- [phone]   option #4 and ask to speak to the on-call Cardiologist. Inform them you are a CORE/heart failure patient at the [facility].      Annmarie Ruelas RN  Cardiology Nurse Care Coordinator    Keep up the good work!    Take Care!                Follow-ups after your visit        Additional Services     Follow-Up with Advanced Heart Failure Clinic                 Your next 10 appointments already scheduled     Mar 12, 2019 12:00 AM CDT   CARDIAC DEVICE CHECK - REMOTE with  ICD REMOTE   [facility] ([facility])    [address] [address]  [address] [place] [id number] [phone]              Future tests that were ordered for you today     Open Future Orders        Priority Expected Expires Ordered    Basic metabolic panel Routine 12/6/2019 12/6/2019 12/6/2018    Follow-Up with Advanced Heart Failure Clinic Routine 12/6/2019 12/6/2019 12/6/2018    N terminal pro BNP outpatient Routine 12/6/2019 12/6/2019 12/6/2018    Echocardiogram Routine 12/6/2019 12/6/2019 12/6/2018            Who to contact     If you have questions or need follow up information about today's clinic visit or your schedule please contact [facility] directly at [phone].  Normal or non-critical lab and imaging results will be communicated to you by [employer], letter or phone within 4 business days after the clinic has received the results. If you do not hear from us within 7 days, please contact the clinic through [employer] or phone. If you have a critical or abnormal lab result, we will notify you by phone as soon as possible.  Submit refill requests through [employer] or call your pharmacy and they will forward the refill request to us. Please allow 3 business days for your refill to be completed.          Additional Information About Your Visit        [employer] Information     [employer] gives you secure access to your electronic health record. If you see a primary care provider, you can also send messages to your care team and make appointments. If you have questions, please call your primary care clinic.  If you do not have a primary care provider, please call [phone] and they will assist you.        Care EveryWhere ID     This is your Care EveryWhere ID. This could be used by other organizations to access your [place] medical records  [id number]        Your Vitals Were     Pulse Height Pulse Oximetry BMI (Body Mass Index)          81 1.702 m (5' 7\") 97% 22.71 kg/m2         Blood Pressure from Last 3 Encounters:   12/06/18 118/77 "   04/10/18 128/88   01/04/18 126/87    Weight from Last 3 Encounters:   12/06/18 65.8 kg (145 lb)   04/10/18 73.5 kg (162 lb)   01/04/18 74.6 kg (164 lb 6.4 oz)              We Performed the Following     Follow-Up with Cardiologist          Today's Medication Changes          These changes are accurate as of 12/6/18  4:39 PM.  If you have any questions, ask your nurse or doctor.               Start taking these medicines.        Dose/Directions    potassium chloride ER 20 MEQ CR tablet   Commonly known as:  K-DUR/KLOR-CON M   Used for:  Chronic systolic congestive heart failure (H)   Started by:  Kimberly Maynard MD        Dose:  20 mEq   Take 1 tablet (20 mEq) by mouth daily   Quantity:  90 tablet   Refills:  3            Where to get your medicines      These medications were sent to [facility] [address] [facility] [address] [facility]  [address] [facility] [address]     Phone:  [phone]     potassium chloride ER 20 MEQ CR tablet                Primary Care Provider Office Phone # Fax #    Maykel Barber [phone]       [facility] [address] [facility] [address]        Equal Access to Services     CHAYO ALICEA AH: Hadii bon thomas hadasho Soomaali, waaxda luqadaha, qaybta kaalmada adeegyada, jeanie barragan. So [facility] [phone].    ATENCIÓN: Si habla español, tiene a castaneda disposición servicios gratuitos de asistencia lingüística. Llame al [phone].    We comply with applicable federal civil rights laws and [place] laws. We do not discriminate on the basis of race, color, national origin, age, disability, sex, sexual orientation, or gender identity.            Thank you!     Thank you for choosing [facility]  for your care. Our goal is always to provide you with excellent care. Hearing back from our patients is one way we can continue to improve our services. Please take a few minutes to complete the written survey that you may  receive in the mail after your visit with us. Thank you!             Your Updated Medication List - Protect others around you: Learn how to safely use, store and throw away your medicines at www.disposemymeds.org.          This list is accurate as of 12/6/18  4:39 PM.  Always use your most recent med list.                   Brand Name Dispense Instructions for use Diagnosis    carvedilol 12.5 MG tablet    COREG    270 tablet    Take 1.5 tablets (18.75 mg) by mouth 2 times daily (with meals)    Chronic systolic congestive heart failure (H)       CRESTOR PO      Take 40 mg by mouth daily        enalapril 5 MG tablet    VASOTEC    30 tablet    Take 1 tablet (5 mg) by mouth 2 times daily    Ischemic cardiomyopathy       LASIX PO      Take 20 mg by mouth        lidocaine visc 2% 2.5mL/5mL & maalox/mylanta w/ simeth 2.5mL/5mL & diphenhydrAMINE 5mg/5mL Susp suspension    MAGIC Mouthwash [facility]OS[facility]ITAL     Swish and swallow 10 mLs in mouth every 6 hours as needed for mouth sores        lidocaine VISCOUS 2 % solution    XYLOCAINE          PLAVIX PO      Take 75 mg by mouth daily        potassium chloride ER 20 MEQ CR tablet    K-DUR/KLOR-CON M    90 tablet    Take 1 tablet (20 mEq) by mouth daily    Chronic systolic congestive heart failure (H)       SPIRONOLACTONE PO      Take 12.5 mg by mouth daily

## 2019-02-23 DIAGNOSIS — I50.22 CHRONIC SYSTOLIC CONGESTIVE HEART FAILURE (H): ICD-10-CM

## 2019-02-26 RX ORDER — CARVEDILOL 12.5 MG/1
18.75 TABLET ORAL 2 TIMES DAILY WITH MEALS
Qty: 270 TABLET | Refills: 3 | Status: SHIPPED | OUTPATIENT
Start: 2019-02-26 | End: 2020-04-15

## 2019-03-12 ENCOUNTER — ANCILLARY PROCEDURE (OUTPATIENT)
Dept: CARDIOLOGY | Facility: CLINIC | Age: 64
End: 2019-03-12
Attending: INTERNAL MEDICINE
Payer: MEDICARE

## 2019-03-12 DIAGNOSIS — I25.5 ISCHEMIC CARDIOMYOPATHY: ICD-10-CM

## 2019-03-12 PROCEDURE — 93295 DEV INTERROG REMOTE 1/2/MLT: CPT | Performed by: INTERNAL MEDICINE

## 2019-03-12 PROCEDURE — 93296 REM INTERROG EVL PM/IDS: CPT | Mod: ZF

## 2019-03-29 DIAGNOSIS — I50.22 CHRONIC SYSTOLIC CONGESTIVE HEART FAILURE (H): Primary | ICD-10-CM

## 2019-04-02 ENCOUNTER — OFFICE VISIT (OUTPATIENT)
Dept: CARDIOLOGY | Facility: CLINIC | Age: 64
End: 2019-04-02
Attending: NURSE PRACTITIONER
Payer: MEDICARE

## 2019-04-02 VITALS
OXYGEN SATURATION: 97 % | BODY MASS INDEX: 23.23 KG/M2 | SYSTOLIC BLOOD PRESSURE: 121 MMHG | HEIGHT: 67 IN | HEART RATE: 84 BPM | WEIGHT: 148 LBS | DIASTOLIC BLOOD PRESSURE: 85 MMHG

## 2019-04-02 DIAGNOSIS — I25.5 ISCHEMIC CARDIOMYOPATHY: Primary | ICD-10-CM

## 2019-04-02 DIAGNOSIS — I50.22 CHRONIC SYSTOLIC CONGESTIVE HEART FAILURE (H): ICD-10-CM

## 2019-04-02 LAB
ANION GAP SERPL CALCULATED.3IONS-SCNC: 2 MMOL/L (ref 3–14)
BUN SERPL-MCNC: 7 MG/DL (ref 7–30)
CALCIUM SERPL-MCNC: 9.2 MG/DL (ref 8.5–10.1)
CHLORIDE SERPL-SCNC: 104 MMOL/L (ref 94–109)
CO2 SERPL-SCNC: 30 MMOL/L (ref 20–32)
CREAT SERPL-MCNC: 0.86 MG/DL (ref 0.66–1.25)
GFR SERPL CREATININE-BSD FRML MDRD: >90 ML/MIN/{1.73_M2}
GLUCOSE SERPL-MCNC: 91 MG/DL (ref 70–99)
POTASSIUM SERPL-SCNC: 3.7 MMOL/L (ref 3.4–5.3)
SODIUM SERPL-SCNC: 136 MMOL/L (ref 133–144)

## 2019-04-02 PROCEDURE — 36415 COLL VENOUS BLD VENIPUNCTURE: CPT | Performed by: NURSE PRACTITIONER

## 2019-04-02 PROCEDURE — G0463 HOSPITAL OUTPT CLINIC VISIT: HCPCS | Mod: ZF

## 2019-04-02 PROCEDURE — 99214 OFFICE O/P EST MOD 30 MIN: CPT | Mod: ZP | Performed by: NURSE PRACTITIONER

## 2019-04-02 PROCEDURE — 80048 BASIC METABOLIC PNL TOTAL CA: CPT | Performed by: NURSE PRACTITIONER

## 2019-04-02 ASSESSMENT — MIFFLIN-ST. JEOR: SCORE: 1424.95

## 2019-04-02 ASSESSMENT — PAIN SCALES - GENERAL: PAINLEVEL: MILD PAIN (2)

## 2019-04-02 ASSESSMENT — PATIENT HEALTH QUESTIONNAIRE - PHQ9: SUM OF ALL RESPONSES TO PHQ QUESTIONS 1-9: 0

## 2019-04-02 NOTE — PATIENT INSTRUCTIONS
"You were seen today in the Cardiovascular Clinic at the [facility].     Cardiology Providers you saw during your visit: Jeanie Hernandez NP     Follow up and medication changes:  1.  No medication changes. Continue same.      Results for VIJAY MCKEE (MRN [id number]) as of 2019 15:38   Ref. Range 2019 14:39   Sodium Latest Ref Range: 133 - 144 mmol/L 136   Potassium Latest Ref Range: 3.4 - 5.3 mmol/L 3.7   Chloride Latest Ref Range: 94 - 109 mmol/L 104   Carbon Dioxide Latest Ref Range: 20 - 32 mmol/L 30   Urea Nitrogen Latest Ref Range: 7 - 30 mg/dL 7   Creatinine Latest Ref Range: 0.66 - 1.25 mg/dL 0.86   GFR Estimate Latest Ref Range: >60 mL/min/1.73_m2 >90   GFR Estimate If Black Latest Ref Range: >60 mL/min/1.73_m2 >90   Calcium Latest Ref Range: 8.5 - 10.1 mg/dL 9.2   Anion Gap Latest Ref Range: 3 - 14 mmol/L 2 (L)   Glucose Latest Ref Range: 70 - 99 mg/dL 91         Please limit your fluid intake to 2 L (68 ounces) daily.  2 Liters a day = 8.5 cups, or 68 ounces.  Please limit your salt intake to 2 grams a day or less.     If you gain 2# in 24 hours or 5# in one week call Kristen Lin RN so we can adjust your medications as needed over the phone.     Please feel free to call me with any questions or concerns.       Kristen Lin RN  [facility]  Cardiology Care Coordinator-Heart Failure Clinic     Questions and [phone].   First press #1 for the [employer] and then press #3 for \"Medical Questions\" to reach us Cardiology Nurses.      On Call Cardiologist for after hours or on weekends: [phone]   option #4 and ask to speak to the on-call Cardiologist. Inform them you are a CORE/heart failure patient at the [place].        If you need a medication refill please contact your pharmacy.  Please allow 3 business days for your refill to be completed.  _______________________________________________________  C.O.R.E. CLINIC Cardiomyopathy, Optimization, " Rehabilitation, Education   The C.O.R.E. CLINIC is a heart failure specialty clinic within the [facility] where you will work with specialized nurse practitioners dedicated to helping patients with heart failure carefully adjust medications, receive education, and learn who and when to call if symptoms develop. They specialize in helping you better understand your condition, slow the progression of your disease, improve the length and quality of your life, help you detect future heart problems before they become life threatening, and avoid hospitalizations.  As always, thank you for trusting us with your health care needs!

## 2019-04-02 NOTE — NURSING NOTE
Chief Complaint   Patient presents with     Follow Up     RTN CORE: 64 year old male presents with Chronic systolic congestive heart failure for follow up with labs prior     Medications reviewed and vitals performed.  Fabiola Feldman CMA

## 2019-04-02 NOTE — PROGRESS NOTES
.   64-year-old man with a long-standing history of ischemic cardiomyopathy,presents for follow up of HFrEF. He was last seen by Dr. Maynard 12/6/18.  He states he feels well. He continues to work in construction. He worked all winter, except when it was sub zero temps. He states he feels better when he is working. Over the winter, he walked an indoor track every night.  He has lost weight over the last year, as he quit eating bedtime snacks.   Pt denies SOB, orthopnea, PND, chest pain, belly bloating, loss of appetite, LE edema. Pt states energy level is good. He was having dry eyes so he quit taking aldactone. He thinks the dry eyes are worse as he is on zrytec with his spring allergies.     PAST MEDICAL HISTORY:  Past Medical History   Diagnosis Date     Chronic systolic congestive heart failure (H)      Ischemic cardiomyopathy      ICD (implantable cardioverter-defibrillator) in place      HTN (hypertension)      Hyperlipidemia      Peripheral vascular disease (H)      CVA (cerebral vascular accident) (H)      Atrial fibrillation (H), paroxysmal         Other medical history includes   - colonic polyp in 11/2010   - atrophic gastritis  -  Haro esophagus   - benign neoplasm of the colon in 2011   - chronic abdominal pain  -  diverticulosis, bile duct hematoma per liver biopsy,  - colectomy 01/05/2011  - a stroke in 10/2011   - right occipital residual vision changes  - Hypertension   - nasal polyp   - ICD placement in 06/2011 for primary prevention.      ALLERGIES:     1.  Aggrenox, severe nausea.   2.  Alcohol, hives.   3.  Effient, rash and itching.   4.  Demerol, itching, nausea and vomiting.   5.  Flagyl.   6.  Levaquin.     SOCIAL HISTORY:  The patient works full-time in the construction business.  He does not drink, smoke or use drugs, and he never has.  [place].  He has 1 son and a 8-year-old grandson that he is very close with.   His granddaughter was born last night and  he is excited about such.      CURRENT MEDICATIONS:  Current Outpatient Medications   Medication Sig Dispense Refill     carvedilol (COREG) 12.5 MG tablet TAKE 1.5 TABLETS (18.75 MG) BY MOUTH 2 TIMES DAILY (WITH MEALS) 270 tablet 3     Clopidogrel Bisulfate (PLAVIX PO) Take 75 mg by mouth daily       enalapril (VASOTEC) 5 MG tablet Take 1 tablet (5 mg) by mouth 2 times daily 30 tablet 1     Furosemide (LASIX PO) Take 20 mg by mouth       lidocaine VISCOUS (XYLOCAINE) 2 % solution        Magic Mouthwash (FV std formula) lidocaine visc 2% 2.5mL/5mL & maalox/mylanta w/ simeth 2.5mL/5mL & diphenhydrAMINE 5mg/5mL Swish and swallow 10 mLs in mouth every 6 hours as needed for mouth sores       potassium chloride ER (K-DUR/KLOR-CON M) 20 MEQ CR tablet Take 1 tablet (20 mEq) by mouth daily 90 tablet 3     Rosuvastatin Calcium (CRESTOR PO) Take 40 mg by mouth daily       SPIRONOLACTONE PO Take 12.5 mg by mouth daily         ROS:   Constitutional: No fever, chills, or sweats. No weight gain/loss.   ENT: No visual disturbance, ear ache, epistaxis, sore throat.   Allergies/Immunologic: Negative.   Respiratory:has a current dry cough, hemoptysis.   Cardiovascular: As per HPI.   GI: No nausea, vomiting, hematemesis, melena, or hematochezia.  : No urinary frequency, dysuria, or hematuria.   Integument: Negative.   Psychiatric: Negative.   Neuro: Negative.   Endocrinology: Negative.   Musculoskeletal: Negative.    EXAM:  There were no vitals taken for this visit.General: appears comfortable, alert and articulate  Head: normocephalic, atraumatic  Eyes: anicteric sclera, EOMI  Neck: no adenopathy  Orophyarynx: moist mucosa, no lesions, dentition intact  Heart: regular, S1/S2, no murmur, gallop, rub, estimated JVP 8cm  Lungs: clear, no rales or wheezing  Abdomen: soft, non-tender, bowel sounds present, no hepatosplenomegaly  Extremities: no clubbing, cyanosis or edema  Neurological: normal speech and affect, no gross motor  deficits    Labs:  CBC RESULTS:  Lab Results   Component Value Date    WBC 7.1 07/21/2016    RBC 5.37 07/21/2016    HGB 15.0 07/21/2016    HCT 46.5 07/21/2016    MCV 87 07/21/2016    MCH 27.9 07/21/2016    MCHC 32.3 07/21/2016    RDW 14.0 07/21/2016     07/21/2016       CMP RESULTS:  Lab Results   Component Value Date     04/02/2019    POTASSIUM 3.7 04/02/2019    CHLORIDE 104 04/02/2019    CO2 30 04/02/2019    ANIONGAP 2 (L) 04/02/2019    GLC 91 04/02/2019    BUN 7 04/02/2019    CR 0.86 04/02/2019    GFRESTIMATED >90 04/02/2019    GFRESTBLACK >90 04/02/2019    JD 9.2 04/02/2019    BILITOTAL 0.6 12/06/2018    ALBUMIN 3.2 (L) 12/06/2018    ALKPHOS 77 12/06/2018    ALT 14 12/06/2018    AST 9 12/06/2018        Assessment and Plan:   1. Chronic systolic heart failure due to ICM  Stage B  NYHA Class 2  On Beta blocker, Ace inhibitor, aldosterone blocker,   Has ICD  Volume euvolemic - has not used lasix since last seen.    2. Hx of CVA - on Plavix    3. Hyperlipidemia: on Crestor 40mg daily    CC  Patient Care Team:  Maykel Barbre MD as PCP - General (Internal Medicine)  Kimberly Maynard MD as MD (Cardiology)  Edel Hernandez, APRN CNP as Nurse Practitioner (Nurse Practitioner - Gerontology)  Kristen Lin RN as Specialty Care Coordinator (Cardiology)  EDEL HERNADNEZ

## 2019-04-02 NOTE — LETTER
4/2/2019      RE: Matt Hester  [address]  [facility] [address]       Dear Colleague,    Thank you for the opportunity to participate in the care of your patient, Matt Hester, at the [facility]ART [facility] at [facility]. Please see a copy of my visit note below.    .   64-year-old man with a long-standing history of ischemic cardiomyopathy,presents for follow up of HFrEF. He was last seen by Dr. Maynard 12/6/18.  He states he feels well. He continues to work in construction. He worked all winter, except when it was sub zero temps. He states he feels better when he is working. Over the winter, he walked an indoor track every night.  He has lost weight over the last year, as he quit eating bedtime snacks.   Pt denies SOB, orthopnea, PND, chest pain, belly bloating, loss of appetite, LE edema. Pt states energy level is good. He was having dry eyes so he quit taking aldactone. He thinks the dry eyes are worse as he is on zrytec with his spring allergies.     PAST MEDICAL HISTORY:  Past Medical History   Diagnosis Date     Chronic systolic congestive heart failure (H)      Ischemic cardiomyopathy      ICD (implantable cardioverter-defibrillator) in place      HTN (hypertension)      Hyperlipidemia      Peripheral vascular disease (H)      CVA (cerebral vascular accident) (H)      Atrial fibrillation (H), paroxysmal         Other medical history includes   - colonic polyp in 11/2010   - atrophic gastritis  -  Haro esophagus   - benign neoplasm of the colon in 2011   - chronic abdominal pain  -  diverticulosis, bile duct hematoma per liver biopsy,  - colectomy 01/05/2011  - a stroke in 10/2011   - right occipital residual vision changes  - Hypertension   - nasal polyp   - ICD placement in 06/2011 for primary prevention.      ALLERGIES:     1.  Aggrenox, severe nausea.   2.  Alcohol, hives.   3.  Effient, rash and itching.   4.  Demerol, itching, nausea and vomiting.   5.   Flagyl.   6.  Levaquin.     SOCIAL HISTORY:  The patient works full-time in the construction business.  He does not drink, smoke or use drugs, and he never has.  [place].  He has 1 son and a 8-year-old grandson that he is very close with.   His granddaughter was born last night and he is excited about such.      CURRENT MEDICATIONS:  Current Outpatient Medications   Medication Sig Dispense Refill     carvedilol (COREG) 12.5 MG tablet TAKE 1.5 TABLETS (18.75 MG) BY MOUTH 2 TIMES DAILY (WITH MEALS) 270 tablet 3     Clopidogrel Bisulfate (PLAVIX PO) Take 75 mg by mouth daily       enalapril (VASOTEC) 5 MG tablet Take 1 tablet (5 mg) by mouth 2 times daily 30 tablet 1     Furosemide (LASIX PO) Take 20 mg by mouth       lidocaine VISCOUS (XYLOCAINE) 2 % solution        Magic Mouthwash (FV std formula) lidocaine visc 2% 2.5mL/5mL & maalox/mylanta w/ simeth 2.5mL/5mL & diphenhydrAMINE 5mg/5mL Swish and swallow 10 mLs in mouth every 6 hours as needed for mouth sores       potassium chloride ER (K-DUR/KLOR-CON M) 20 MEQ CR tablet Take 1 tablet (20 mEq) by mouth daily 90 tablet 3     Rosuvastatin Calcium (CRESTOR PO) Take 40 mg by mouth daily       SPIRONOLACTONE PO Take 12.5 mg by mouth daily         ROS:   Constitutional: No fever, chills, or sweats. No weight gain/loss.   ENT: No visual disturbance, ear ache, epistaxis, sore throat.   Allergies/Immunologic: Negative.   Respiratory:has a current dry cough, hemoptysis.   Cardiovascular: As per HPI.   GI: No nausea, vomiting, hematemesis, melena, or hematochezia.  : No urinary frequency, dysuria, or hematuria.   Integument: Negative.   Psychiatric: Negative.   Neuro: Negative.   Endocrinology: Negative.   Musculoskeletal: Negative.    EXAM:  There were no vitals taken for this visit.General: appears comfortable, alert and articulate  Head: normocephalic, atraumatic  Eyes: anicteric sclera, EOMI  Neck: no adenopathy  Orophyarynx: moist mucosa,  no lesions, dentition intact  Heart: regular, S1/S2, no murmur, gallop, rub, estimated JVP 8cm  Lungs: clear, no rales or wheezing  Abdomen: soft, non-tender, bowel sounds present, no hepatosplenomegaly  Extremities: no clubbing, cyanosis or edema  Neurological: normal speech and affect, no gross motor deficits    Labs:  CBC RESULTS:  Lab Results   Component Value Date    WBC 7.1 07/21/2016    RBC 5.37 07/21/2016    HGB 15.0 07/21/2016    HCT 46.5 07/21/2016    MCV 87 07/21/2016    MCH 27.9 07/21/2016    MCHC 32.3 07/21/2016    RDW 14.0 07/21/2016     07/21/2016       CMP RESULTS:  Lab Results   Component Value Date     04/02/2019    POTASSIUM 3.7 04/02/2019    CHLORIDE 104 04/02/2019    CO2 30 04/02/2019    ANIONGAP 2 (L) 04/02/2019    GLC 91 04/02/2019    BUN 7 04/02/2019    CR 0.86 04/02/2019    GFRESTIMATED >90 04/02/2019    GFRESTBLACK >90 04/02/2019    JD 9.2 04/02/2019    BILITOTAL 0.6 12/06/2018    ALBUMIN 3.2 (L) 12/06/2018    ALKPHOS 77 12/06/2018    ALT 14 12/06/2018    AST 9 12/06/2018        Assessment and Plan:   1. Chronic systolic heart failure due to ICM  Stage B  NYHA Class 2  On Beta blocker, Ace inhibitor, aldosterone blocker,   Has ICD  Volume euvolemic - has not used lasix since last seen.    2. Hx of CVA - on Plavix    3. Hyperlipidemia: on Crestor 40mg daily        Please do not hesitate to contact me if you have any questions/concerns.     Sincerely,     NEEMA Turk CNP        CC  Patient Care Team:  Maykel Barber MD as PCP - General (Internal Medicine)  Kimberly Maynard MD as MD (Cardiology)  Jeanie Hernandez APRN CNP as Nurse Practitioner (Nurse Practitioner - Gerontology)  Kristen Lin, ALETA as Specialty Care Coordinator (Cardiology)

## 2019-04-16 LAB
MDC_IDC_EPISODE_DTM: NORMAL
MDC_IDC_EPISODE_DTM: NORMAL
MDC_IDC_EPISODE_DURATION: 6 S
MDC_IDC_EPISODE_ID: NORMAL
MDC_IDC_EPISODE_ID: NORMAL
MDC_IDC_EPISODE_TYPE: NORMAL
MDC_IDC_EPISODE_TYPE: NORMAL
MDC_IDC_LEAD_IMPLANT_DT: NORMAL
MDC_IDC_LEAD_LOCATION: NORMAL
MDC_IDC_LEAD_LOCATION_DETAIL_1: NORMAL
MDC_IDC_LEAD_MFG: NORMAL
MDC_IDC_LEAD_MODEL: NORMAL
MDC_IDC_LEAD_POLARITY_TYPE: NORMAL
MDC_IDC_LEAD_SERIAL: NORMAL
MDC_IDC_MSMT_BATTERY_DTM: NORMAL
MDC_IDC_MSMT_BATTERY_REMAINING_LONGEVITY: 60 MO
MDC_IDC_MSMT_BATTERY_REMAINING_PERCENTAGE: 63 %
MDC_IDC_MSMT_BATTERY_STATUS: NORMAL
MDC_IDC_MSMT_CAP_CHARGE_DTM: NORMAL
MDC_IDC_MSMT_CAP_CHARGE_DTM: NORMAL
MDC_IDC_MSMT_CAP_CHARGE_ENERGY: 1 J
MDC_IDC_MSMT_CAP_CHARGE_TIME: 0.3 S
MDC_IDC_MSMT_CAP_CHARGE_TIME: 9.8 S
MDC_IDC_MSMT_CAP_CHARGE_TYPE: NORMAL
MDC_IDC_MSMT_CAP_CHARGE_TYPE: NORMAL
MDC_IDC_MSMT_LEADCHNL_RV_IMPEDANCE_VALUE: 638 OHM
MDC_IDC_MSMT_LEADCHNL_RV_PACING_THRESHOLD_AMPLITUDE: 0.9 V
MDC_IDC_MSMT_LEADCHNL_RV_PACING_THRESHOLD_PULSEWIDTH: 0.5 MS
MDC_IDC_PG_IMPLANT_DTM: NORMAL
MDC_IDC_PG_MFG: NORMAL
MDC_IDC_PG_MODEL: NORMAL
MDC_IDC_PG_SERIAL: NORMAL
MDC_IDC_PG_TYPE: NORMAL
MDC_IDC_SESS_CLINIC_NAME: NORMAL
MDC_IDC_SESS_DTM: NORMAL
MDC_IDC_SESS_TYPE: NORMAL
MDC_IDC_SET_BRADY_LOWRATE: 40 {BEATS}/MIN
MDC_IDC_SET_BRADY_MODE: NORMAL
MDC_IDC_SET_LEADCHNL_RV_PACING_AMPLITUDE: 2.4 V
MDC_IDC_SET_LEADCHNL_RV_PACING_POLARITY: NORMAL
MDC_IDC_SET_LEADCHNL_RV_PACING_PULSEWIDTH: 0.5 MS
MDC_IDC_SET_LEADCHNL_RV_SENSING_ADAPTATION_MODE: NORMAL
MDC_IDC_SET_LEADCHNL_RV_SENSING_POLARITY: NORMAL
MDC_IDC_SET_LEADCHNL_RV_SENSING_SENSITIVITY: 0.6 MV
MDC_IDC_SET_ZONE_DETECTION_INTERVAL: 273 MS
MDC_IDC_SET_ZONE_DETECTION_INTERVAL: 333 MS
MDC_IDC_SET_ZONE_TYPE: NORMAL
MDC_IDC_SET_ZONE_TYPE: NORMAL
MDC_IDC_SET_ZONE_VENDOR_TYPE: NORMAL
MDC_IDC_SET_ZONE_VENDOR_TYPE: NORMAL
MDC_IDC_STAT_BRADY_DTM_END: NORMAL
MDC_IDC_STAT_BRADY_DTM_START: NORMAL
MDC_IDC_STAT_BRADY_RV_PERCENT_PACED: 0 %
MDC_IDC_STAT_EPISODE_RECENT_COUNT: 0
MDC_IDC_STAT_EPISODE_RECENT_COUNT: 1
MDC_IDC_STAT_EPISODE_RECENT_COUNT_DTM_END: NORMAL
MDC_IDC_STAT_EPISODE_RECENT_COUNT_DTM_START: NORMAL
MDC_IDC_STAT_EPISODE_TYPE: NORMAL
MDC_IDC_STAT_EPISODE_VENDOR_TYPE: NORMAL
MDC_IDC_STAT_TACHYTHERAPY_ATP_DELIVERED_RECENT: 0
MDC_IDC_STAT_TACHYTHERAPY_ATP_DELIVERED_TOTAL: 0
MDC_IDC_STAT_TACHYTHERAPY_RECENT_DTM_END: NORMAL
MDC_IDC_STAT_TACHYTHERAPY_RECENT_DTM_START: NORMAL
MDC_IDC_STAT_TACHYTHERAPY_SHOCKS_ABORTED_RECENT: 0
MDC_IDC_STAT_TACHYTHERAPY_SHOCKS_ABORTED_TOTAL: 0
MDC_IDC_STAT_TACHYTHERAPY_SHOCKS_DELIVERED_RECENT: 0
MDC_IDC_STAT_TACHYTHERAPY_SHOCKS_DELIVERED_TOTAL: 1
MDC_IDC_STAT_TACHYTHERAPY_TOTAL_DTM_END: NORMAL
MDC_IDC_STAT_TACHYTHERAPY_TOTAL_DTM_START: NORMAL

## 2019-06-18 ENCOUNTER — ANCILLARY PROCEDURE (OUTPATIENT)
Dept: CARDIOLOGY | Facility: CLINIC | Age: 64
End: 2019-06-18
Attending: INTERNAL MEDICINE
Payer: MEDICARE

## 2019-06-18 DIAGNOSIS — I25.5 ISCHEMIC CARDIOMYOPATHY: ICD-10-CM

## 2019-06-18 PROCEDURE — 93296 REM INTERROG EVL PM/IDS: CPT | Mod: ZF

## 2019-06-18 PROCEDURE — 93295 DEV INTERROG REMOTE 1/2/MLT: CPT | Performed by: INTERNAL MEDICINE

## 2019-07-13 LAB
MDC_IDC_EPISODE_DTM: NORMAL
MDC_IDC_EPISODE_ID: NORMAL
MDC_IDC_EPISODE_TYPE: NORMAL
MDC_IDC_LEAD_IMPLANT_DT: NORMAL
MDC_IDC_LEAD_LOCATION: NORMAL
MDC_IDC_LEAD_LOCATION_DETAIL_1: NORMAL
MDC_IDC_LEAD_MFG: NORMAL
MDC_IDC_LEAD_MODEL: NORMAL
MDC_IDC_LEAD_POLARITY_TYPE: NORMAL
MDC_IDC_LEAD_SERIAL: NORMAL
MDC_IDC_MSMT_BATTERY_DTM: NORMAL
MDC_IDC_MSMT_BATTERY_REMAINING_LONGEVITY: 60 MO
MDC_IDC_MSMT_BATTERY_REMAINING_PERCENTAGE: 61 %
MDC_IDC_MSMT_BATTERY_STATUS: NORMAL
MDC_IDC_MSMT_CAP_CHARGE_DTM: NORMAL
MDC_IDC_MSMT_CAP_CHARGE_DTM: NORMAL
MDC_IDC_MSMT_CAP_CHARGE_ENERGY: 1 J
MDC_IDC_MSMT_CAP_CHARGE_TIME: 0.3 S
MDC_IDC_MSMT_CAP_CHARGE_TIME: 9.8 S
MDC_IDC_MSMT_CAP_CHARGE_TYPE: NORMAL
MDC_IDC_MSMT_CAP_CHARGE_TYPE: NORMAL
MDC_IDC_MSMT_LEADCHNL_RV_IMPEDANCE_VALUE: 610 OHM
MDC_IDC_MSMT_LEADCHNL_RV_PACING_THRESHOLD_AMPLITUDE: 0.9 V
MDC_IDC_MSMT_LEADCHNL_RV_PACING_THRESHOLD_PULSEWIDTH: 0.5 MS
MDC_IDC_PG_IMPLANT_DTM: NORMAL
MDC_IDC_PG_MFG: NORMAL
MDC_IDC_PG_MODEL: NORMAL
MDC_IDC_PG_SERIAL: NORMAL
MDC_IDC_PG_TYPE: NORMAL
MDC_IDC_SESS_CLINIC_NAME: NORMAL
MDC_IDC_SESS_DTM: NORMAL
MDC_IDC_SESS_TYPE: NORMAL
MDC_IDC_SET_BRADY_LOWRATE: 40 {BEATS}/MIN
MDC_IDC_SET_BRADY_MODE: NORMAL
MDC_IDC_SET_LEADCHNL_RV_PACING_AMPLITUDE: 2.4 V
MDC_IDC_SET_LEADCHNL_RV_PACING_POLARITY: NORMAL
MDC_IDC_SET_LEADCHNL_RV_PACING_PULSEWIDTH: 0.5 MS
MDC_IDC_SET_LEADCHNL_RV_SENSING_ADAPTATION_MODE: NORMAL
MDC_IDC_SET_LEADCHNL_RV_SENSING_POLARITY: NORMAL
MDC_IDC_SET_LEADCHNL_RV_SENSING_SENSITIVITY: 0.6 MV
MDC_IDC_SET_ZONE_DETECTION_INTERVAL: 273 MS
MDC_IDC_SET_ZONE_DETECTION_INTERVAL: 333 MS
MDC_IDC_SET_ZONE_TYPE: NORMAL
MDC_IDC_SET_ZONE_TYPE: NORMAL
MDC_IDC_SET_ZONE_VENDOR_TYPE: NORMAL
MDC_IDC_SET_ZONE_VENDOR_TYPE: NORMAL
MDC_IDC_STAT_BRADY_DTM_END: NORMAL
MDC_IDC_STAT_BRADY_DTM_START: NORMAL
MDC_IDC_STAT_BRADY_RV_PERCENT_PACED: 0 %
MDC_IDC_STAT_EPISODE_RECENT_COUNT: 0
MDC_IDC_STAT_EPISODE_RECENT_COUNT: 1
MDC_IDC_STAT_EPISODE_RECENT_COUNT_DTM_END: NORMAL
MDC_IDC_STAT_EPISODE_RECENT_COUNT_DTM_START: NORMAL
MDC_IDC_STAT_EPISODE_TYPE: NORMAL
MDC_IDC_STAT_EPISODE_VENDOR_TYPE: NORMAL
MDC_IDC_STAT_TACHYTHERAPY_ATP_DELIVERED_RECENT: 0
MDC_IDC_STAT_TACHYTHERAPY_ATP_DELIVERED_TOTAL: 0
MDC_IDC_STAT_TACHYTHERAPY_RECENT_DTM_END: NORMAL
MDC_IDC_STAT_TACHYTHERAPY_RECENT_DTM_START: NORMAL
MDC_IDC_STAT_TACHYTHERAPY_SHOCKS_ABORTED_RECENT: 0
MDC_IDC_STAT_TACHYTHERAPY_SHOCKS_ABORTED_TOTAL: 0
MDC_IDC_STAT_TACHYTHERAPY_SHOCKS_DELIVERED_RECENT: 0
MDC_IDC_STAT_TACHYTHERAPY_SHOCKS_DELIVERED_TOTAL: 1
MDC_IDC_STAT_TACHYTHERAPY_TOTAL_DTM_END: NORMAL
MDC_IDC_STAT_TACHYTHERAPY_TOTAL_DTM_START: NORMAL

## 2019-08-22 ENCOUNTER — TRANSFERRED RECORDS (OUTPATIENT)
Dept: HEALTH INFORMATION MANAGEMENT | Facility: CLINIC | Age: 64
End: 2019-08-22

## 2019-10-10 ENCOUNTER — ANCILLARY PROCEDURE (OUTPATIENT)
Dept: CARDIOLOGY | Facility: CLINIC | Age: 64
End: 2019-10-10
Attending: INTERNAL MEDICINE
Payer: COMMERCIAL

## 2019-10-10 ENCOUNTER — OFFICE VISIT (OUTPATIENT)
Dept: CARDIOLOGY | Facility: CLINIC | Age: 64
End: 2019-10-10
Attending: INTERNAL MEDICINE
Payer: MEDICARE

## 2019-10-10 VITALS
OXYGEN SATURATION: 92 % | DIASTOLIC BLOOD PRESSURE: 70 MMHG | WEIGHT: 145 LBS | HEIGHT: 67 IN | SYSTOLIC BLOOD PRESSURE: 106 MMHG | BODY MASS INDEX: 22.76 KG/M2 | HEART RATE: 82 BPM

## 2019-10-10 DIAGNOSIS — I25.5 ISCHEMIC CARDIOMYOPATHY: ICD-10-CM

## 2019-10-10 DIAGNOSIS — I50.22 CHRONIC SYSTOLIC CONGESTIVE HEART FAILURE (H): ICD-10-CM

## 2019-10-10 LAB
ANION GAP SERPL CALCULATED.3IONS-SCNC: 6 MMOL/L (ref 3–14)
BUN SERPL-MCNC: 6 MG/DL (ref 7–30)
CALCIUM SERPL-MCNC: 8.5 MG/DL (ref 8.5–10.1)
CHLORIDE SERPL-SCNC: 101 MMOL/L (ref 94–109)
CO2 SERPL-SCNC: 28 MMOL/L (ref 20–32)
CREAT SERPL-MCNC: 0.88 MG/DL (ref 0.66–1.25)
GFR SERPL CREATININE-BSD FRML MDRD: >90 ML/MIN/{1.73_M2}
GLUCOSE SERPL-MCNC: 115 MG/DL (ref 70–99)
NT-PROBNP SERPL-MCNC: 204 PG/ML (ref 0–125)
POTASSIUM SERPL-SCNC: 3.4 MMOL/L (ref 3.4–5.3)
SODIUM SERPL-SCNC: 135 MMOL/L (ref 133–144)

## 2019-10-10 PROCEDURE — 83880 ASSAY OF NATRIURETIC PEPTIDE: CPT | Performed by: INTERNAL MEDICINE

## 2019-10-10 PROCEDURE — 36415 COLL VENOUS BLD VENIPUNCTURE: CPT | Performed by: INTERNAL MEDICINE

## 2019-10-10 PROCEDURE — 99214 OFFICE O/P EST MOD 30 MIN: CPT | Mod: ZP | Performed by: INTERNAL MEDICINE

## 2019-10-10 PROCEDURE — 80048 BASIC METABOLIC PNL TOTAL CA: CPT | Performed by: INTERNAL MEDICINE

## 2019-10-10 PROCEDURE — G0463 HOSPITAL OUTPT CLINIC VISIT: HCPCS | Mod: ZF

## 2019-10-10 RX ADMIN — Medication 5 ML: at 14:30

## 2019-10-10 ASSESSMENT — PAIN SCALES - GENERAL: PAINLEVEL: NO PAIN (0)

## 2019-10-10 ASSESSMENT — MIFFLIN-ST. JEOR: SCORE: 1411.35

## 2019-10-10 NOTE — LETTER
"10/10/2019      RE: Matt Hester  [address]  [facility] [address]       Dear Colleague,    Thank you for the opportunity to participate in the care of your patient, Matt Hester, at the [facility] CARE at [facility]. Please see a copy of my visit note below.    [facility]    October 10, 2019    RE: Matt Hester   MRN: [id number]   : 1955      Dear Colleagues:        We had the pleasure of seeing Mr. Matt Hester in the [facility] on 2018.  As you know, he is a very pleasant, 62-year-old man with a long-standing history of ischemic cardiomyopathy, and I will detail his cardiac history below.  He has been followed in [place] and was previously seeing Jun Velazquez as well as one of our nurse practitioners, who has now moved here, Jeanie Hernandez.  When he was first diagnosed, his ejection fraction was in the 10%-15% range.  He has now been stable in the 25% range with \"an excellent quality of life\" for many years.      His cardiac history is summarized as follows:  His first MI was in .  In , he had an anterior wall MI requiring stenting.  He had a single-chamber ICD implanted in 2004 for primary prevention and a generator change on 2010 and again in 2011.  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  He has regained function of his left arm but his visual deficit persists.    Today he continues to feel quite well. He has lost about 10 -15 lbs in the past 2 years due to eating better (less calories) and getting a little more exercise. 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 Lost vision in his left eye at the time of his stroke. No orthopnea, PND, chest pain, " palpitations, syncope, ICD shocks. No hospitalizations since I saw him last.        PAST MEDICAL HISTORY:  Past Medical History   Diagnosis Date     Chronic systolic congestive heart failure (H)      Ischemic cardiomyopathy      ICD (implantable cardioverter-defibrillator) in place      HTN (hypertension)      Hyperlipidemia      Peripheral vascular disease (H)      CVA (cerebral vascular accident) (H)      Atrial fibrillation (H), paroxysmal     TIA     Other medical history includes   - colonic polyp in 11/2010   - atrophic gastritis  -  Haro esophagus   - benign neoplasm of the colon in 2011   - chronic abdominal pain  -  diverticulosis, bile duct hematoma per liver biopsy,  - colectomy 01/05/2011  - a stroke in 10/2011   - right occipital residual vision changes  - Hypertension   - nasal polyp   - ICD placement in 06/2011 for primary prevention.      ALLERGIES:     1.  Aggrenox, severe nausea.   2.  Alcohol, hives.   3.  Effient, rash and itching.   4.  Demerol, itching, nausea and vomiting.   5.  Flagyl.   6.  Levaquin.     SOCIAL HISTORY:  The patient works full-time in the construction business.  He does not drink, smoke or use drugs, and he never has.  [place] north of [place].  He has 1 son and a grandson that he is very close with and now another grand daughter      CURRENT MEDICATIONS:  Current Outpatient Medications   Medication Sig Dispense Refill     carvedilol (COREG) 12.5 MG tablet TAKE 1.5 TABLETS (18.75 MG) BY MOUTH 2 TIMES DAILY (WITH MEALS) 270 tablet 3     Clopidogrel Bisulfate (PLAVIX PO) Take 75 mg by mouth daily       enalapril (VASOTEC) 5 MG tablet Take 1 tablet (5 mg) by mouth 2 times daily 30 tablet 1     Furosemide (LASIX PO) Take 20 mg by mouth       potassium chloride ER (K-DUR/KLOR-CON M) 20 MEQ CR tablet Take 1 tablet (20 mEq) by mouth daily 90 tablet 3     Rosuvastatin Calcium (CRESTOR PO) Take 40 mg by mouth daily       SPIRONOLACTONE PO Take 12.5 mg by mouth daily   "     lidocaine VISCOUS (XYLOCAINE) 2 % solution        Magic Mouthwash (FV std formula) lidocaine visc 2% 2.5mL/5mL & maalox/mylanta w/ simeth 2.5mL/5mL & diphenhydrAMINE 5mg/5mL Swish and swallow 10 mLs in mouth every 6 hours as needed for mouth sores         ROS:   Constitutional: No fever, chills, or sweats. Intentional weight loss 15 lbs.   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  No ear ache, epistaxis, sore throat.   Allergies/Immunologic: Negative.   Respiratory: No cough, hemoptysis.   Cardiovascular: As per HPI.   GI: No nausea, vomiting, hematemesis, melena, or hematochezia.  : No urinary frequency, dysuria, or hematuria.   Integument: Negative.   Psychiatric: Negative.   Neuro: Negative.   Endocrinology: Negative.   Musculoskeletal: Negative.    EXAM:  /70 (BP Location: Right arm, Patient Position: Chair, Cuff Size: Adult Regular)   Pulse 82   Ht 1.702 m (5' 7\")   Wt 65.8 kg (145 lb)   SpO2 92%   BMI 22.71 kg/m     General: appears comfortable, alert and articulate  Head: normocephalic, atraumatic  Eyes: anicteric sclera, EOMI  Neck: no adenopathy  Orophyarynx: moist mucosa, no lesions, dentition intact  Heart: regular, S1/S2, no murmur, gallop, rub, estimated JVP  <8 without HJR  Lungs: clear, no rales or wheezing  Abdomen: soft, non-tender, bowel sounds present, no hepatosplenomegaly  Extremities: no clubbing, cyanosis or edema  Neurological: normal speech and affect, no gross motor deficits    Labs:  CBC RESULTS:  Lab Results   Component Value Date    WBC 7.1 07/21/2016    RBC 5.37 07/21/2016    HGB 15.0 07/21/2016    HCT 46.5 07/21/2016    MCV 87 07/21/2016    MCH 27.9 07/21/2016    MCHC 32.3 07/21/2016    RDW 14.0 07/21/2016     07/21/2016       CMP RESULTS:  Lab Results   Component Value Date     10/10/2019    POTASSIUM 3.4 10/10/2019    CHLORIDE 101 10/10/2019    CO2 28 10/10/2019    ANIONGAP 6 10/10/2019     (H) 10/10/2019    BUN 6 (L) 10/10/2019    CR 0.88 10/10/2019    " GFRESTIMATED >90 10/10/2019    GFRESTBLACK >90 10/10/2019    JD 8.5 10/10/2019    BILITOTAL 0.6 12/06/2018    ALBUMIN 3.2 (L) 12/06/2018    ALKPHOS 77 12/06/2018    ALT 14 12/06/2018    AST 9 12/06/2018      INR RESULTS:  Lab Results   Component Value Date    INR 1.00 07/21/2016     Lab Results   Component Value Date    MAG 2.3 07/21/2016     No results found for: NTBNPI  Lab Results   Component Value Date    NTBNP 204 (H) 10/10/2019     Coronary Angiogram 2011  1. Widely patent stents in the ramus intermedius and LAD.   2. Two-vessel coronary artery disease.  3. Moderate left ventricular chamber enlargement.   4. Left ventricular systolic function is severely reduced with a visually estimated ejection fraction of 15 to 20% and global hypokinesis.   5. Anterior apical and inferior akinesis.   6. Possible left ventricular thrombus.     Right heart catheterization from 01/24/2014 shows an RA of 8, RV 30/8, PA 29/16 with a wedge of 11, cardiac output 3.95 L/min, body surface area of 1.87 giving a normal cardiac index, PA saturation of 76%, normal pulmonary vascular resistance.    Echocardiogram 4/2018      Interpretation Summary  Left ventricular size is normal.Severely (EF 26%) reduced left ventricular  function is present.There is apical akinesis with severe diffuse  hypokinesis.Global right ventricular function is normal.  No significant valvular disease present.  The inferior vena cava was normal in size with preserved respiratory  variability.  No pericardial effusion present.     Previous study not available for comparison.          Last stress test with a Lexiscan 12/30/2013:  Small territories of periinfarct reversibility and 2 large territories of fixed defects, severely reduced EF of 25%.  CT angio of the neck:  Mild atherosclerosis, no significant narrowing or dilation of intracranial circulation.  This was performed on 05/14/2016.     Echocardiogram today:   Interpretation Summary  Moderate to severe left  ventricular systolic dysfunction.  LVEF 26% based on biplane 2D tracing.  Anteroseptal and apical akinesis.  Left ventricular thrombus is present. The thrombus is laminar.  No significant valve dysfunction.  IVC is not dilated.  Small pericardial effusion.     This study was compared with the study from 4/10/18 and there is no  significant change.      Assessment and Plan:   In summary, this is a very pleasant, 62-year-old man with a long-standing history of ischemic cardiomyopathy with an ejection fraction in the 25% range who has been stable on medical therapy for many years with an ICD for primary prevention.  Today, he appears euvolemic without evidence of volume overload and has been without any heart failure hospitalizations.  He is NYHA class I, stage C today.      He is on an excellent medication regimen including high dose statin, Crestor 40 mg daily, enalapril 5 mg twice daily, a full dose aspirin, coreg, aldactone and    For his CAD; he is on statin, ASA     Remote CVA: on ASA/plavix - last work up negative (in 2016 for TIA) for cause although there is a possible LV thrombus on echo today.  He has been on dual antiplatelet therapy for some time.  It does appear organized.  One option would be to do Coumadin and aspirin for 6 months and then repeat with Definity.      Should he develop symptoms may next move would be to change him from enalapril to Entresto.  He has not wanted to change to Entresto now as he feels his blood pressure to be too low.  We will continue to address at the next visit    With regard to prevention of sudden cardiac death, ICD present .     Overall plan: add coumadin 6 months, repeat echo with BARB Ware - stop plavix- we may go back to dual anti-plt after resolution of LV clot     RTC 1 year -sooner as needed      Kimberly Maynard MD   of Medicine   [facility] Division of Cardiology         CC  Patient Care Team:  Maykel Barber MD as PCP -  General (Internal Medicine)  Kimberly Maynard MD as MD (Cardiology)  Edel Hernandez, NEEMA CNP as Nurse Practitioner (Nurse Practitioner - Gerontology)  Kristen Obrien, RN as Specialty Care Coordinator (Cardiology)  Isabell Steel, RN as Registered Nurse (Cardiology)  EDEL HERNANDEZ          Please do not hesitate to contact me if you have any questions/concerns.     Sincerely,     Kimberly Maynard MD

## 2019-10-10 NOTE — PATIENT INSTRUCTIONS
It was a pleasure to see you in clinic today.  Please do not hesitate to call with any questions or concerns.  You are scheduled for a remote transmission on 1/10/20.  We look forward to seeing you in clinic at your next device check in 6 months.    Emani Prescott, RN, MS, CCRN  Electrophysiology Nurse Clinician  [facility]    During Business Hours Please Call:  [phone]  After Hours Please Call:  [phone] - select option #4 and ask for job code [phone]

## 2019-10-10 NOTE — PATIENT INSTRUCTIONS
"Cardiology Providers you saw during your visit:  Dr. Maynard    Medication changes:   None       Follow up: 1 year, Jeanie in 6 months       Labs:  Results for VIJAY MCKEE (MRN [id number]) as of 10/10/2019 15:18   Ref. Range 10/10/2019 12:32   Sodium Latest Ref Range: 133 - 144 mmol/L 135   Potassium Latest Ref Range: 3.4 - 5.3 mmol/L 3.4   Chloride Latest Ref Range: 94 - 109 mmol/L 101   Carbon Dioxide Latest Ref Range: 20 - 32 mmol/L 28   Urea Nitrogen Latest Ref Range: 7 - 30 mg/dL 6 (L)   Creatinine Latest Ref Range: 0.66 - 1.25 mg/dL 0.88   GFR Estimate Latest Ref Range: >60 mL/min/1.73_m2 >90   GFR Estimate If Black Latest Ref Range: >60 mL/min/1.73_m2 >90   Calcium Latest Ref Range: 8.5 - 10.1 mg/dL 8.5   Anion Gap Latest Ref Range: 3 - 14 mmol/L 6   N-Terminal Pro Bnp Latest Ref Range: 0 - 125 pg/mL 204 (H)   Glucose Latest Ref Range: 70 - 99 mg/dL 115 (H)       Please call if you have :  1. Weight gain of more than 2 pounds in a day or 5 pounds in a week  2. Increased shortness of breath, swelling or bloating  3. Dizziness, lightheadedness   4. Any questions or concerns.       Follow the American Heart Association Diet and Lifestyle recommendations:  Limit saturated fat, trans fat, sodium, red meat, sweets and sugar-sweetened beverages. If you choose to eat red meat, compare labels and select the leanest cuts available.  Aim for at least 150 minutes of moderate physical activity or 75 minutes of vigorous physical activity - or an equal combination of both - each week.      During business hours: [phone], press option # 1 to be routed to the [facility] then option # 4 for \"To send a message to your care team\"      After hours, weekends or holidays: On Call Cardiologist- [phone]   option #4 and ask to speak to the on-call Cardiologist. Inform them you are a CORE/heart failure patient at the [facility].      Isabell Steel RN BSN CHFN  Cardiology Care Coordinator - Heart Failure/ C.O.R.E. " "[facility]   Questions and [phone]   First press #1 for the University and then press #4 for \"To send a message to your care team\"    "

## 2019-10-10 NOTE — NURSING NOTE
Chief Complaint   Patient presents with     Follow Up     64 year old male presents with Chronic systolic congestive heart failure for follow up with labs, echo, device check prior     Vitals were taken and medications were reconciled.   Priscila Morgan  3:03 PM

## 2019-10-10 NOTE — PROGRESS NOTES
"[facility]    October 10, 2019    RE: Matt Hester   MRN: [id number]   : 1955      Dear Colleagues:        We had the pleasure of seeing Mr. Matt Hester in the [facility] on 2018.  As you know, he is a very pleasant, 62-year-old man with a long-standing history of ischemic cardiomyopathy, and I will detail his cardiac history below.  He has been followed in [place] and was previously seeing Jun Velazquez as well as one of our nurse practitioners, who has now moved here, Jeanie Hernandez.  When he was first diagnosed, his ejection fraction was in the 10%-15% range.  He has now been stable in the 25% range with \"an excellent quality of life\" for many years.      His cardiac history is summarized as follows:  His first MI was in .  In , he had an anterior wall MI requiring stenting.  He had a single-chamber ICD implanted in 2004 for primary prevention and a generator change on 2010 and again in 2011.  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  He has regained function of his left arm but his visual deficit persists.    Today he continues to feel quite well. He has lost about 10 -15 lbs in the past 2 years due to eating better (less calories) and getting a little more exercise. 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 Lost vision in his left eye at the time of his stroke. No orthopnea, PND, chest pain, palpitations, syncope, ICD shocks. No hospitalizations since I saw him last.        PAST MEDICAL HISTORY:  Past Medical History   Diagnosis Date     Chronic systolic congestive heart failure (H)      Ischemic cardiomyopathy      ICD (implantable cardioverter-defibrillator) in place      HTN (hypertension)      " Hyperlipidemia      Peripheral vascular disease (H)      CVA (cerebral vascular accident) (H)      Atrial fibrillation (H), paroxysmal     TIA     Other medical history includes   - colonic polyp in 11/2010   - atrophic gastritis  -  Haro esophagus   - benign neoplasm of the colon in 2011   - chronic abdominal pain  -  diverticulosis, bile duct hematoma per liver biopsy,  - colectomy 01/05/2011  - a stroke in 10/2011   - right occipital residual vision changes  - Hypertension   - nasal polyp   - ICD placement in 06/2011 for primary prevention.      ALLERGIES:     1.  Aggrenox, severe nausea.   2.  Alcohol, hives.   3.  Effient, rash and itching.   4.  Demerol, itching, nausea and vomiting.   5.  Flagyl.   6.  Levaquin.     SOCIAL HISTORY:  The patient works full-time in the construction business.  He does not drink, smoke or use drugs, and he never has.  [place].  He has 1 son and a grandson that he is very close with and now another grand daughter      CURRENT MEDICATIONS:  Current Outpatient Medications   Medication Sig Dispense Refill     carvedilol (COREG) 12.5 MG tablet TAKE 1.5 TABLETS (18.75 MG) BY MOUTH 2 TIMES DAILY (WITH MEALS) 270 tablet 3     Clopidogrel Bisulfate (PLAVIX PO) Take 75 mg by mouth daily       enalapril (VASOTEC) 5 MG tablet Take 1 tablet (5 mg) by mouth 2 times daily 30 tablet 1     Furosemide (LASIX PO) Take 20 mg by mouth       potassium chloride ER (K-DUR/KLOR-CON M) 20 MEQ CR tablet Take 1 tablet (20 mEq) by mouth daily 90 tablet 3     Rosuvastatin Calcium (CRESTOR PO) Take 40 mg by mouth daily       SPIRONOLACTONE PO Take 12.5 mg by mouth daily       lidocaine VISCOUS (XYLOCAINE) 2 % solution        Magic Mouthwash (FV std formula) lidocaine visc 2% 2.5mL/5mL & maalox/mylanta w/ simeth 2.5mL/5mL & diphenhydrAMINE 5mg/5mL Swish and swallow 10 mLs in mouth every 6 hours as needed for mouth sores         ROS:   Constitutional: No fever, chills, or  "sweats. Intentional weight loss 15 lbs.   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  No ear ache, epistaxis, sore throat.   Allergies/Immunologic: Negative.   Respiratory: No cough, hemoptysis.   Cardiovascular: As per HPI.   GI: No nausea, vomiting, hematemesis, melena, or hematochezia.  : No urinary frequency, dysuria, or hematuria.   Integument: Negative.   Psychiatric: Negative.   Neuro: Negative.   Endocrinology: Negative.   Musculoskeletal: Negative.    EXAM:  /70 (BP Location: Right arm, Patient Position: Chair, Cuff Size: Adult Regular)   Pulse 82   Ht 1.702 m (5' 7\")   Wt 65.8 kg (145 lb)   SpO2 92%   BMI 22.71 kg/m    General: appears comfortable, alert and articulate  Head: normocephalic, atraumatic  Eyes: anicteric sclera, EOMI  Neck: no adenopathy  Orophyarynx: moist mucosa, no lesions, dentition intact  Heart: regular, S1/S2, no murmur, gallop, rub, estimated JVP  <8 without HJR  Lungs: clear, no rales or wheezing  Abdomen: soft, non-tender, bowel sounds present, no hepatosplenomegaly  Extremities: no clubbing, cyanosis or edema  Neurological: normal speech and affect, no gross motor deficits    Labs:  CBC RESULTS:  Lab Results   Component Value Date    WBC 7.1 07/21/2016    RBC 5.37 07/21/2016    HGB 15.0 07/21/2016    HCT 46.5 07/21/2016    MCV 87 07/21/2016    MCH 27.9 07/21/2016    MCHC 32.3 07/21/2016    RDW 14.0 07/21/2016     07/21/2016       CMP RESULTS:  Lab Results   Component Value Date     10/10/2019    POTASSIUM 3.4 10/10/2019    CHLORIDE 101 10/10/2019    CO2 28 10/10/2019    ANIONGAP 6 10/10/2019     (H) 10/10/2019    BUN 6 (L) 10/10/2019    CR 0.88 10/10/2019    GFRESTIMATED >90 10/10/2019    GFRESTBLACK >90 10/10/2019    JD 8.5 10/10/2019    BILITOTAL 0.6 12/06/2018    ALBUMIN 3.2 (L) 12/06/2018    ALKPHOS 77 12/06/2018    ALT 14 12/06/2018    AST 9 12/06/2018      INR RESULTS:  Lab Results   Component Value Date    INR 1.00 07/21/2016     Lab Results   Component Value " Date    MAG 2.3 07/21/2016     No results found for: NTBNPI  Lab Results   Component Value Date    NTBNP 204 (H) 10/10/2019     Coronary Angiogram 2011  1. Widely patent stents in the ramus intermedius and LAD.   2. Two-vessel coronary artery disease.  3. Moderate left ventricular chamber enlargement.   4. Left ventricular systolic function is severely reduced with a visually estimated ejection fraction of 15 to 20% and global hypokinesis.   5. Anterior apical and inferior akinesis.   6. Possible left ventricular thrombus.     Right heart catheterization from 01/24/2014 shows an RA of 8, RV 30/8, PA 29/16 with a wedge of 11, cardiac output 3.95 L/min, body surface area of 1.87 giving a normal cardiac index, PA saturation of 76%, normal pulmonary vascular resistance.    Echocardiogram 4/2018      Interpretation Summary  Left ventricular size is normal.Severely (EF 26%) reduced left ventricular  function is present.There is apical akinesis with severe diffuse  hypokinesis.Global right ventricular function is normal.  No significant valvular disease present.  The inferior vena cava was normal in size with preserved respiratory  variability.  No pericardial effusion present.     Previous study not available for comparison.          Last stress test with a Lexiscan 12/30/2013:  Small territories of periinfarct reversibility and 2 large territories of fixed defects, severely reduced EF of 25%.  CT angio of the neck:  Mild atherosclerosis, no significant narrowing or dilation of intracranial circulation.  This was performed on 05/14/2016.     Echocardiogram today:   Interpretation Summary  Moderate to severe left ventricular systolic dysfunction.  LVEF 26% based on biplane 2D tracing.  Anteroseptal and apical akinesis.  Left ventricular thrombus is present. The thrombus is laminar.  No significant valve dysfunction.  IVC is not dilated.  Small pericardial effusion.     This study was compared with the study from 4/10/18  and there is no  significant change.      Assessment and Plan:   In summary, this is a very pleasant, 62-year-old man with a long-standing history of ischemic cardiomyopathy with an ejection fraction in the 25% range who has been stable on medical therapy for many years with an ICD for primary prevention.  Today, he appears euvolemic without evidence of volume overload and has been without any heart failure hospitalizations.  He is NYHA class I, stage C today.      He is on an excellent medication regimen including high dose statin, Crestor 40 mg daily, enalapril 5 mg twice daily, a full dose aspirin, coreg, aldactone and    For his CAD; he is on statin, ASA     Remote CVA: on ASA/plavix - last work up negative (in 2016 for TIA) for cause although there is a possible LV thrombus on echo today.  He has been on dual antiplatelet therapy for some time.  It does appear organized.  One option would be to do Coumadin and aspirin for 6 months and then repeat with Definity.      Should he develop symptoms may next move would be to change him from enalapril to Entresto.  He has not wanted to change to Entresto now as he feels his blood pressure to be too low.  We will continue to address at the next visit    With regard to prevention of sudden cardiac death, ICD present .     Overall plan: add coumadin 6 months, repeat echo with NP Edel - stop plavix- we may go back to dual anti-plt after resolution of LV clot     RTC 1 year -sooner as needed      Kimberly Maynard MD   of Medicine   [facility] Division of Cardiology         CC  Patient Care Team:  Maykel Barber MD as PCP - General (Internal Medicine)  Kimberly Maynard MD as MD (Cardiology)  Edel Hernandez, APRN CNP as Nurse Practitioner (Nurse Practitioner - Gerontology)  Kristen Obrien, RN as Specialty Care Coordinator (Cardiology)  Isabell Steel, RN as Registered Nurse (Cardiology)  EDEL HERNANDEZ

## 2019-10-11 ENCOUNTER — PATIENT OUTREACH (OUTPATIENT)
Dept: CARDIOLOGY | Facility: CLINIC | Age: 64
End: 2019-10-11

## 2019-10-11 DIAGNOSIS — I51.3 LV (LEFT VENTRICULAR) MURAL THROMBUS: Primary | ICD-10-CM

## 2019-10-11 LAB
MDC_IDC_LEAD_IMPLANT_DT: NORMAL
MDC_IDC_LEAD_LOCATION: NORMAL
MDC_IDC_LEAD_LOCATION_DETAIL_1: NORMAL
MDC_IDC_LEAD_MFG: NORMAL
MDC_IDC_LEAD_MODEL: NORMAL
MDC_IDC_LEAD_POLARITY_TYPE: NORMAL
MDC_IDC_LEAD_SERIAL: NORMAL
MDC_IDC_MSMT_BATTERY_REMAINING_LONGEVITY: 54 MO
MDC_IDC_MSMT_BATTERY_STATUS: NORMAL
MDC_IDC_MSMT_CAP_CHARGE_DTM: NORMAL
MDC_IDC_MSMT_CAP_CHARGE_ENERGY: 1.1 J
MDC_IDC_MSMT_CAP_CHARGE_TIME: 0.27
MDC_IDC_MSMT_CAP_CHARGE_TIME: 9.95
MDC_IDC_MSMT_CAP_CHARGE_TYPE: NORMAL
MDC_IDC_MSMT_CAP_CHARGE_TYPE: NORMAL
MDC_IDC_MSMT_LEADCHNL_RV_IMPEDANCE_VALUE: 649 OHM
MDC_IDC_MSMT_LEADCHNL_RV_PACING_THRESHOLD_AMPLITUDE: 0.9 V
MDC_IDC_MSMT_LEADCHNL_RV_PACING_THRESHOLD_PULSEWIDTH: 0.5 MS
MDC_IDC_MSMT_LEADCHNL_RV_SENSING_INTR_AMPL: 21.3 MV
MDC_IDC_PG_IMPLANT_DTM: NORMAL
MDC_IDC_PG_MFG: NORMAL
MDC_IDC_PG_MODEL: NORMAL
MDC_IDC_PG_SERIAL: NORMAL
MDC_IDC_PG_TYPE: NORMAL
MDC_IDC_SESS_CLINIC_NAME: NORMAL
MDC_IDC_SESS_DTM: NORMAL
MDC_IDC_SESS_TYPE: NORMAL
MDC_IDC_SET_BRADY_HYSTRATE: NORMAL
MDC_IDC_SET_BRADY_LOWRATE: 40 {BEATS}/MIN
MDC_IDC_SET_BRADY_MODE: NORMAL
MDC_IDC_SET_LEADCHNL_RV_PACING_AMPLITUDE: 2.4 V
MDC_IDC_SET_LEADCHNL_RV_PACING_ANODE_ELECTRODE_1: NORMAL
MDC_IDC_SET_LEADCHNL_RV_PACING_ANODE_LOCATION_1: NORMAL
MDC_IDC_SET_LEADCHNL_RV_PACING_CAPTURE_MODE: NORMAL
MDC_IDC_SET_LEADCHNL_RV_PACING_CATHODE_ELECTRODE_1: NORMAL
MDC_IDC_SET_LEADCHNL_RV_PACING_CATHODE_LOCATION_1: NORMAL
MDC_IDC_SET_LEADCHNL_RV_PACING_POLARITY: NORMAL
MDC_IDC_SET_LEADCHNL_RV_PACING_PULSEWIDTH: 0.5 MS
MDC_IDC_SET_LEADCHNL_RV_SENSING_ADAPTATION_MODE: NORMAL
MDC_IDC_SET_LEADCHNL_RV_SENSING_ANODE_ELECTRODE_1: NORMAL
MDC_IDC_SET_LEADCHNL_RV_SENSING_ANODE_LOCATION_1: NORMAL
MDC_IDC_SET_LEADCHNL_RV_SENSING_CATHODE_ELECTRODE_1: NORMAL
MDC_IDC_SET_LEADCHNL_RV_SENSING_CATHODE_LOCATION_1: NORMAL
MDC_IDC_SET_LEADCHNL_RV_SENSING_POLARITY: NORMAL
MDC_IDC_SET_LEADCHNL_RV_SENSING_SENSITIVITY: 0.6 MV
MDC_IDC_SET_ZONE_DETECTION_INTERVAL: 273 MS
MDC_IDC_SET_ZONE_DETECTION_INTERVAL: 333 MS
MDC_IDC_SET_ZONE_TYPE: NORMAL
MDC_IDC_SET_ZONE_VENDOR_TYPE: NORMAL
MDC_IDC_STAT_BRADY_RV_PERCENT_PACED: 1 %
MDC_IDC_STAT_EPISODE_RECENT_COUNT: 0
MDC_IDC_STAT_EPISODE_RECENT_COUNT: 0
MDC_IDC_STAT_EPISODE_RECENT_COUNT: 1
MDC_IDC_STAT_EPISODE_RECENT_COUNT_DTM_END: NORMAL
MDC_IDC_STAT_EPISODE_RECENT_COUNT_DTM_START: NORMAL
MDC_IDC_STAT_EPISODE_TOTAL_COUNT: 0
MDC_IDC_STAT_EPISODE_TOTAL_COUNT: 0
MDC_IDC_STAT_EPISODE_TOTAL_COUNT: 18
MDC_IDC_STAT_EPISODE_TOTAL_COUNT_DTM_END: NORMAL
MDC_IDC_STAT_EPISODE_TYPE: NORMAL
MDC_IDC_STAT_EPISODE_VENDOR_TYPE: NORMAL
MDC_IDC_STAT_TACHYTHERAPY_ATP_DELIVERED_RECENT: 0
MDC_IDC_STAT_TACHYTHERAPY_ATP_DELIVERED_TOTAL: 0
MDC_IDC_STAT_TACHYTHERAPY_RECENT_DTM_END: NORMAL
MDC_IDC_STAT_TACHYTHERAPY_RECENT_DTM_START: NORMAL
MDC_IDC_STAT_TACHYTHERAPY_SHOCKS_ABORTED_RECENT: 0
MDC_IDC_STAT_TACHYTHERAPY_SHOCKS_ABORTED_TOTAL: 0
MDC_IDC_STAT_TACHYTHERAPY_SHOCKS_DELIVERED_RECENT: 0
MDC_IDC_STAT_TACHYTHERAPY_SHOCKS_DELIVERED_TOTAL: 1
MDC_IDC_STAT_TACHYTHERAPY_TOTAL_DTM_END: NORMAL

## 2019-10-11 NOTE — PROGRESS NOTES
Directed by Dr Maynard to start coumadin for 6 months, INR goal 2-3 for 6 months, remain on plavix until therapeutic, once therapeutic stop plavix, recheck echo in 6 months and follow up with [facility].  Called Matt to update him. He was very concerned about going off Plavix ( has had a stroke in the past when off plavix on aggregnox) and wanted to make sure his long time NP was on board with the plan.  Spoke to Jeanie Hernandez NP- who supported the plan.  Matt does not have a primary care physician in [place] at this time, so currently working with [facility] to come up with a plan.      Date: 10/11/2019    Time of Call: 4:04 PM     Diagnosis:  LV thrombus     [ VORB ] Ordering provider: Dr Maynard    Order: INR referral. Coumadin for 6 months for LV thrombus, INR goal 2-3, stop plavix once therapeutic. Stay on aspirin CORE in 6 months, Echo with defenitiy in 6 months.  Once LV thrombus has resolved, back to plavix/Aspirin     Order received by: Isabell Steel RN       Follow-up/additional notes: call [facility] to follow up on INRs

## 2019-10-14 NOTE — PROGRESS NOTES
Chelsey from [facility] called to work on establishing coumadin care for Matt.  I will fax the INR referral and the clinic notes to her.      Isabell Steel RN

## 2019-10-14 NOTE — PROGRESS NOTES
Matt called back.  He will get a hold of [facility] today and establish care with a PCP and then let me know the plan.  Once I know his PCP I will call Rosa and ask to speak to Veena the INR nurse at the clinic.      Isabell Steel RN

## 2019-10-14 NOTE — PROGRESS NOTES
Called Nitish to give further updates on the coumadin plan.  Per INR RN at [facility], Nitish needs to establish with a primary care provider.  Once he has a PCP, I can contact that clinic to set up an INR referral.  Called Nitsih to update him, left a message requesting a call back    Isabell Steel RN

## 2020-01-20 ENCOUNTER — ANCILLARY PROCEDURE (OUTPATIENT)
Dept: CARDIOLOGY | Facility: CLINIC | Age: 65
End: 2020-01-20
Attending: INTERNAL MEDICINE
Payer: MEDICARE

## 2020-01-20 DIAGNOSIS — I25.5 ISCHEMIC CARDIOMYOPATHY: ICD-10-CM

## 2020-01-20 PROCEDURE — 93295 DEV INTERROG REMOTE 1/2/MLT: CPT | Mod: ZP | Performed by: INTERNAL MEDICINE

## 2020-01-20 PROCEDURE — 93296 REM INTERROG EVL PM/IDS: CPT | Mod: ZF

## 2020-02-03 LAB
MDC_IDC_EPISODE_DTM: NORMAL
MDC_IDC_EPISODE_DURATION: 6 S
MDC_IDC_EPISODE_ID: NORMAL
MDC_IDC_EPISODE_TYPE: NORMAL
MDC_IDC_LEAD_IMPLANT_DT: NORMAL
MDC_IDC_LEAD_LOCATION: NORMAL
MDC_IDC_LEAD_LOCATION_DETAIL_1: NORMAL
MDC_IDC_LEAD_MFG: NORMAL
MDC_IDC_LEAD_MODEL: NORMAL
MDC_IDC_LEAD_POLARITY_TYPE: NORMAL
MDC_IDC_LEAD_SERIAL: NORMAL
MDC_IDC_MSMT_BATTERY_DTM: NORMAL
MDC_IDC_MSMT_BATTERY_REMAINING_LONGEVITY: 48 MO
MDC_IDC_MSMT_BATTERY_REMAINING_PERCENTAGE: 53 %
MDC_IDC_MSMT_BATTERY_STATUS: NORMAL
MDC_IDC_MSMT_CAP_CHARGE_DTM: NORMAL
MDC_IDC_MSMT_CAP_CHARGE_DTM: NORMAL
MDC_IDC_MSMT_CAP_CHARGE_ENERGY: 1 J
MDC_IDC_MSMT_CAP_CHARGE_TIME: 0.3 S
MDC_IDC_MSMT_CAP_CHARGE_TIME: 10 S
MDC_IDC_MSMT_CAP_CHARGE_TYPE: NORMAL
MDC_IDC_MSMT_CAP_CHARGE_TYPE: NORMAL
MDC_IDC_MSMT_LEADCHNL_RV_IMPEDANCE_VALUE: 636 OHM
MDC_IDC_PG_IMPLANT_DTM: NORMAL
MDC_IDC_PG_MFG: NORMAL
MDC_IDC_PG_MODEL: NORMAL
MDC_IDC_PG_SERIAL: NORMAL
MDC_IDC_PG_TYPE: NORMAL
MDC_IDC_SESS_CLINIC_NAME: NORMAL
MDC_IDC_SESS_DTM: NORMAL
MDC_IDC_SESS_TYPE: NORMAL
MDC_IDC_SET_BRADY_LOWRATE: 40 {BEATS}/MIN
MDC_IDC_SET_BRADY_MODE: NORMAL
MDC_IDC_SET_LEADCHNL_RV_PACING_AMPLITUDE: 2.4 V
MDC_IDC_SET_LEADCHNL_RV_PACING_POLARITY: NORMAL
MDC_IDC_SET_LEADCHNL_RV_PACING_PULSEWIDTH: 0.5 MS
MDC_IDC_SET_LEADCHNL_RV_SENSING_ADAPTATION_MODE: NORMAL
MDC_IDC_SET_LEADCHNL_RV_SENSING_POLARITY: NORMAL
MDC_IDC_SET_LEADCHNL_RV_SENSING_SENSITIVITY: 0.6 MV
MDC_IDC_SET_ZONE_DETECTION_INTERVAL: 273 MS
MDC_IDC_SET_ZONE_DETECTION_INTERVAL: 333 MS
MDC_IDC_SET_ZONE_TYPE: NORMAL
MDC_IDC_SET_ZONE_TYPE: NORMAL
MDC_IDC_SET_ZONE_VENDOR_TYPE: NORMAL
MDC_IDC_SET_ZONE_VENDOR_TYPE: NORMAL
MDC_IDC_STAT_BRADY_DTM_END: NORMAL
MDC_IDC_STAT_BRADY_DTM_START: NORMAL
MDC_IDC_STAT_BRADY_RV_PERCENT_PACED: 0 %
MDC_IDC_STAT_EPISODE_RECENT_COUNT: 0
MDC_IDC_STAT_EPISODE_RECENT_COUNT: 1
MDC_IDC_STAT_EPISODE_RECENT_COUNT_DTM_END: NORMAL
MDC_IDC_STAT_EPISODE_RECENT_COUNT_DTM_START: NORMAL
MDC_IDC_STAT_EPISODE_TYPE: NORMAL
MDC_IDC_STAT_EPISODE_VENDOR_TYPE: NORMAL
MDC_IDC_STAT_TACHYTHERAPY_ATP_DELIVERED_RECENT: 0
MDC_IDC_STAT_TACHYTHERAPY_ATP_DELIVERED_TOTAL: 0
MDC_IDC_STAT_TACHYTHERAPY_RECENT_DTM_END: NORMAL
MDC_IDC_STAT_TACHYTHERAPY_RECENT_DTM_START: NORMAL
MDC_IDC_STAT_TACHYTHERAPY_SHOCKS_ABORTED_RECENT: 0
MDC_IDC_STAT_TACHYTHERAPY_SHOCKS_ABORTED_TOTAL: 0
MDC_IDC_STAT_TACHYTHERAPY_SHOCKS_DELIVERED_RECENT: 0
MDC_IDC_STAT_TACHYTHERAPY_SHOCKS_DELIVERED_TOTAL: 1
MDC_IDC_STAT_TACHYTHERAPY_TOTAL_DTM_END: NORMAL
MDC_IDC_STAT_TACHYTHERAPY_TOTAL_DTM_START: NORMAL

## 2020-02-26 ENCOUNTER — TRANSFERRED RECORDS (OUTPATIENT)
Dept: HEALTH INFORMATION MANAGEMENT | Facility: CLINIC | Age: 65
End: 2020-02-26

## 2020-03-02 ENCOUNTER — HEALTH MAINTENANCE LETTER (OUTPATIENT)
Age: 65
End: 2020-03-02

## 2020-03-25 ENCOUNTER — ANCILLARY PROCEDURE (OUTPATIENT)
Dept: CARDIOLOGY | Facility: CLINIC | Age: 65
End: 2020-03-25
Attending: INTERNAL MEDICINE
Payer: MEDICARE

## 2020-03-25 DIAGNOSIS — I25.5 ISCHEMIC CARDIOMYOPATHY: ICD-10-CM

## 2020-03-25 PROCEDURE — 99207 CARDIAC DEVICE CHECK - REMOTE: CPT | Performed by: INTERNAL MEDICINE

## 2020-03-25 PROCEDURE — 93296 REM INTERROG EVL PM/IDS: CPT | Mod: ZF

## 2020-03-31 LAB
MDC_IDC_EPISODE_DTM: NORMAL
MDC_IDC_EPISODE_DTM: NORMAL
MDC_IDC_EPISODE_ID: NORMAL
MDC_IDC_EPISODE_ID: NORMAL
MDC_IDC_EPISODE_TYPE: NORMAL
MDC_IDC_EPISODE_TYPE: NORMAL
MDC_IDC_LEAD_IMPLANT_DT: NORMAL
MDC_IDC_LEAD_LOCATION: NORMAL
MDC_IDC_LEAD_LOCATION_DETAIL_1: NORMAL
MDC_IDC_LEAD_MFG: NORMAL
MDC_IDC_LEAD_MODEL: NORMAL
MDC_IDC_LEAD_POLARITY_TYPE: NORMAL
MDC_IDC_LEAD_SERIAL: NORMAL
MDC_IDC_MSMT_BATTERY_DTM: NORMAL
MDC_IDC_MSMT_BATTERY_REMAINING_LONGEVITY: 48 MO
MDC_IDC_MSMT_BATTERY_REMAINING_PERCENTAGE: 52 %
MDC_IDC_MSMT_BATTERY_STATUS: NORMAL
MDC_IDC_MSMT_CAP_CHARGE_DTM: NORMAL
MDC_IDC_MSMT_CAP_CHARGE_DTM: NORMAL
MDC_IDC_MSMT_CAP_CHARGE_ENERGY: 1 J
MDC_IDC_MSMT_CAP_CHARGE_TIME: 0.3 S
MDC_IDC_MSMT_CAP_CHARGE_TIME: 10.1 S
MDC_IDC_MSMT_CAP_CHARGE_TYPE: NORMAL
MDC_IDC_MSMT_CAP_CHARGE_TYPE: NORMAL
MDC_IDC_MSMT_LEADCHNL_RV_IMPEDANCE_VALUE: 620 OHM
MDC_IDC_MSMT_LEADCHNL_RV_PACING_THRESHOLD_AMPLITUDE: 0.9 V
MDC_IDC_MSMT_LEADCHNL_RV_PACING_THRESHOLD_PULSEWIDTH: 0.5 MS
MDC_IDC_PG_IMPLANT_DTM: NORMAL
MDC_IDC_PG_MFG: NORMAL
MDC_IDC_PG_MODEL: NORMAL
MDC_IDC_PG_SERIAL: NORMAL
MDC_IDC_PG_TYPE: NORMAL
MDC_IDC_SESS_CLINIC_NAME: NORMAL
MDC_IDC_SESS_DTM: NORMAL
MDC_IDC_SESS_TYPE: NORMAL
MDC_IDC_SET_BRADY_LOWRATE: 40 {BEATS}/MIN
MDC_IDC_SET_BRADY_MODE: NORMAL
MDC_IDC_SET_LEADCHNL_RV_PACING_AMPLITUDE: 2.4 V
MDC_IDC_SET_LEADCHNL_RV_PACING_POLARITY: NORMAL
MDC_IDC_SET_LEADCHNL_RV_PACING_PULSEWIDTH: 0.5 MS
MDC_IDC_SET_LEADCHNL_RV_SENSING_ADAPTATION_MODE: NORMAL
MDC_IDC_SET_LEADCHNL_RV_SENSING_POLARITY: NORMAL
MDC_IDC_SET_LEADCHNL_RV_SENSING_SENSITIVITY: 0.6 MV
MDC_IDC_SET_ZONE_DETECTION_INTERVAL: 273 MS
MDC_IDC_SET_ZONE_DETECTION_INTERVAL: 333 MS
MDC_IDC_SET_ZONE_TYPE: NORMAL
MDC_IDC_SET_ZONE_TYPE: NORMAL
MDC_IDC_SET_ZONE_VENDOR_TYPE: NORMAL
MDC_IDC_SET_ZONE_VENDOR_TYPE: NORMAL
MDC_IDC_STAT_BRADY_DTM_END: NORMAL
MDC_IDC_STAT_BRADY_DTM_START: NORMAL
MDC_IDC_STAT_BRADY_RV_PERCENT_PACED: 0 %
MDC_IDC_STAT_EPISODE_RECENT_COUNT: 0
MDC_IDC_STAT_EPISODE_RECENT_COUNT_DTM_END: NORMAL
MDC_IDC_STAT_EPISODE_RECENT_COUNT_DTM_START: NORMAL
MDC_IDC_STAT_EPISODE_TYPE: NORMAL
MDC_IDC_STAT_EPISODE_VENDOR_TYPE: NORMAL
MDC_IDC_STAT_TACHYTHERAPY_ATP_DELIVERED_RECENT: 0
MDC_IDC_STAT_TACHYTHERAPY_ATP_DELIVERED_TOTAL: 0
MDC_IDC_STAT_TACHYTHERAPY_RECENT_DTM_END: NORMAL
MDC_IDC_STAT_TACHYTHERAPY_RECENT_DTM_START: NORMAL
MDC_IDC_STAT_TACHYTHERAPY_SHOCKS_ABORTED_RECENT: 0
MDC_IDC_STAT_TACHYTHERAPY_SHOCKS_ABORTED_TOTAL: 0
MDC_IDC_STAT_TACHYTHERAPY_SHOCKS_DELIVERED_RECENT: 0
MDC_IDC_STAT_TACHYTHERAPY_SHOCKS_DELIVERED_TOTAL: 1
MDC_IDC_STAT_TACHYTHERAPY_TOTAL_DTM_END: NORMAL
MDC_IDC_STAT_TACHYTHERAPY_TOTAL_DTM_START: NORMAL

## 2020-04-07 ENCOUNTER — VIRTUAL VISIT (OUTPATIENT)
Dept: CARDIOLOGY | Facility: CLINIC | Age: 65
End: 2020-04-07
Attending: NURSE PRACTITIONER
Payer: COMMERCIAL

## 2020-04-07 DIAGNOSIS — I50.22 CHRONIC SYSTOLIC CONGESTIVE HEART FAILURE (H): ICD-10-CM

## 2020-04-07 PROCEDURE — 99214 OFFICE O/P EST MOD 30 MIN: CPT | Mod: TEL | Performed by: NURSE PRACTITIONER

## 2020-04-07 NOTE — PROGRESS NOTES
"Matt Hester is a 65 year old male who is being evaluated via a billable telephone visit.      The patient has been notified of following:     \"This telephone visit will be conducted via a call between you and your physician/provider. We have found that certain health care needs can be provided without the need for a physical exam.  This service lets us provide the care you need with a short phone conversation.  If a prescription is necessary we can send it directly to your pharmacy.  If lab work is needed we can place an order for that and you can then stop by our lab to have the test done at a later time.    Telephone visits are billed at different rates depending on your insurance coverage. During this emergency period, for some insurers they may be billed the same as an in-person visit.  Please reach out to your insurance provider with any questions.    If during the course of the call the physician/provider feels a telephone visit is not appropriate, you will not be charged for this service.\"    Patient has given verbal consent for Telephone visit?  Yes    Matt Hester presents for follow up of chronic systolic heart failure due to ischemic cardiomyopathy.    I have reviewed and updated the patient's Past Medical History, Social History, Family History and Medication List.    ALLERGIES  Aggrenox; Alcohol; Demerol [meperidine]; Flagyl [metronidazole]; Levaquin [levofloxacin]; Pollen extract; Sulfamethoxazole-trimethoprim; and Prasugrel    Additional provider notes:Gloria is a 65 year old male patient with chronic systolic heart failure. His MI was 17 years ago to the day. He was recently hospitalized with gastric pain, SOB, and erratic BP. No clear underlying cause was determined for his sx. During that hospitalization he had an echo, which showed resolution of his previous LV thrombus and subsequently his warfarin was discontinued.   Today he states he feels well. He followed up post hospitalization " in [place] at [facility] with Myrna Olsen PA-C. At that time he was feeling well and no changes were made to his medical regime.   Pt denies SOB, orthopnea, PND, chest pain, belly bloating, loss of appetite, LE edema. Pt states energy level is good.  He is active with his grandchildren, and has no symptoms with exertion.    ECHO: 2/27/20  EF 27%    Pt reports home BP running 120-140/80.   HIs home wt is 142 # and this has been stable over the past year.    1. Chronic systolic heart failure; NYHA class II, AHA stage C; On ACE/BB/Aldosterone antagoinist.  Uses lasix rarely (none in past year).  Volume status stable    2. NSVT - on BB/ stable on device checks    3. CAD - S/P multiple PCI. On statin/BB/ASA/Plavix (chonic)    4. Recent LV thrombus ; treated with warfarin, now resolved.    Plan: no medication changes. Follow up with Dr. Maynard this fall. Pt instructed to call with any sx changes.  Jeanie Hernandez, PETER, CHFN     Total time of visit: 35 minutes.

## 2020-04-09 DIAGNOSIS — I50.22 CHRONIC SYSTOLIC CONGESTIVE HEART FAILURE (H): ICD-10-CM

## 2020-04-09 RX ORDER — POTASSIUM CHLORIDE 750 MG/1
10 TABLET, EXTENDED RELEASE ORAL WEEKLY
COMMUNITY
Start: 2020-04-09

## 2020-04-13 DIAGNOSIS — I50.22 CHRONIC SYSTOLIC CONGESTIVE HEART FAILURE (H): ICD-10-CM

## 2020-04-15 RX ORDER — CARVEDILOL 12.5 MG/1
18.75 TABLET ORAL 2 TIMES DAILY WITH MEALS
Qty: 270 TABLET | Refills: 2 | Status: SHIPPED | OUTPATIENT
Start: 2020-04-15 | End: 2021-04-27

## 2020-07-08 ENCOUNTER — ANCILLARY PROCEDURE (OUTPATIENT)
Dept: CARDIOLOGY | Facility: CLINIC | Age: 65
End: 2020-07-08
Attending: INTERNAL MEDICINE
Payer: MEDICARE

## 2020-07-08 DIAGNOSIS — I25.5 ISCHEMIC CARDIOMYOPATHY: ICD-10-CM

## 2020-07-08 PROCEDURE — 93296 REM INTERROG EVL PM/IDS: CPT | Mod: ZF

## 2020-07-08 PROCEDURE — 93295 DEV INTERROG REMOTE 1/2/MLT: CPT | Performed by: INTERNAL MEDICINE

## 2020-07-10 LAB
MDC_IDC_EPISODE_DTM: NORMAL
MDC_IDC_EPISODE_DTM: NORMAL
MDC_IDC_EPISODE_DURATION: 6 S
MDC_IDC_EPISODE_ID: NORMAL
MDC_IDC_EPISODE_ID: NORMAL
MDC_IDC_EPISODE_TYPE: NORMAL
MDC_IDC_EPISODE_TYPE: NORMAL
MDC_IDC_LEAD_IMPLANT_DT: NORMAL
MDC_IDC_LEAD_LOCATION: NORMAL
MDC_IDC_LEAD_LOCATION_DETAIL_1: NORMAL
MDC_IDC_LEAD_MFG: NORMAL
MDC_IDC_LEAD_MODEL: NORMAL
MDC_IDC_LEAD_POLARITY_TYPE: NORMAL
MDC_IDC_LEAD_SERIAL: NORMAL
MDC_IDC_MSMT_BATTERY_DTM: NORMAL
MDC_IDC_MSMT_BATTERY_REMAINING_LONGEVITY: 42 MO
MDC_IDC_MSMT_BATTERY_REMAINING_PERCENTAGE: 47 %
MDC_IDC_MSMT_BATTERY_STATUS: NORMAL
MDC_IDC_MSMT_CAP_CHARGE_DTM: NORMAL
MDC_IDC_MSMT_CAP_CHARGE_DTM: NORMAL
MDC_IDC_MSMT_CAP_CHARGE_ENERGY: 1 J
MDC_IDC_MSMT_CAP_CHARGE_TIME: 0.3 S
MDC_IDC_MSMT_CAP_CHARGE_TIME: 10.2 S
MDC_IDC_MSMT_CAP_CHARGE_TYPE: NORMAL
MDC_IDC_MSMT_CAP_CHARGE_TYPE: NORMAL
MDC_IDC_MSMT_LEADCHNL_RV_IMPEDANCE_VALUE: 670 OHM
MDC_IDC_MSMT_LEADCHNL_RV_PACING_THRESHOLD_AMPLITUDE: 0.9 V
MDC_IDC_MSMT_LEADCHNL_RV_PACING_THRESHOLD_PULSEWIDTH: 0.5 MS
MDC_IDC_PG_IMPLANT_DTM: NORMAL
MDC_IDC_PG_MFG: NORMAL
MDC_IDC_PG_MODEL: NORMAL
MDC_IDC_PG_SERIAL: NORMAL
MDC_IDC_PG_TYPE: NORMAL
MDC_IDC_SESS_CLINIC_NAME: NORMAL
MDC_IDC_SESS_DTM: NORMAL
MDC_IDC_SESS_TYPE: NORMAL
MDC_IDC_SET_BRADY_LOWRATE: 40 {BEATS}/MIN
MDC_IDC_SET_BRADY_MODE: NORMAL
MDC_IDC_SET_LEADCHNL_RV_PACING_AMPLITUDE: 2.4 V
MDC_IDC_SET_LEADCHNL_RV_PACING_POLARITY: NORMAL
MDC_IDC_SET_LEADCHNL_RV_PACING_PULSEWIDTH: 0.5 MS
MDC_IDC_SET_LEADCHNL_RV_SENSING_ADAPTATION_MODE: NORMAL
MDC_IDC_SET_LEADCHNL_RV_SENSING_POLARITY: NORMAL
MDC_IDC_SET_LEADCHNL_RV_SENSING_SENSITIVITY: 0.6 MV
MDC_IDC_SET_ZONE_DETECTION_INTERVAL: 273 MS
MDC_IDC_SET_ZONE_DETECTION_INTERVAL: 333 MS
MDC_IDC_SET_ZONE_TYPE: NORMAL
MDC_IDC_SET_ZONE_TYPE: NORMAL
MDC_IDC_SET_ZONE_VENDOR_TYPE: NORMAL
MDC_IDC_SET_ZONE_VENDOR_TYPE: NORMAL
MDC_IDC_STAT_BRADY_DTM_END: NORMAL
MDC_IDC_STAT_BRADY_DTM_START: NORMAL
MDC_IDC_STAT_BRADY_RV_PERCENT_PACED: 0 %
MDC_IDC_STAT_EPISODE_RECENT_COUNT: 0
MDC_IDC_STAT_EPISODE_RECENT_COUNT: 1
MDC_IDC_STAT_EPISODE_RECENT_COUNT_DTM_END: NORMAL
MDC_IDC_STAT_EPISODE_RECENT_COUNT_DTM_START: NORMAL
MDC_IDC_STAT_EPISODE_TYPE: NORMAL
MDC_IDC_STAT_EPISODE_VENDOR_TYPE: NORMAL
MDC_IDC_STAT_TACHYTHERAPY_ATP_DELIVERED_RECENT: 0
MDC_IDC_STAT_TACHYTHERAPY_ATP_DELIVERED_TOTAL: 0
MDC_IDC_STAT_TACHYTHERAPY_RECENT_DTM_END: NORMAL
MDC_IDC_STAT_TACHYTHERAPY_RECENT_DTM_START: NORMAL
MDC_IDC_STAT_TACHYTHERAPY_SHOCKS_ABORTED_RECENT: 0
MDC_IDC_STAT_TACHYTHERAPY_SHOCKS_ABORTED_TOTAL: 0
MDC_IDC_STAT_TACHYTHERAPY_SHOCKS_DELIVERED_RECENT: 0
MDC_IDC_STAT_TACHYTHERAPY_SHOCKS_DELIVERED_TOTAL: 1
MDC_IDC_STAT_TACHYTHERAPY_TOTAL_DTM_END: NORMAL
MDC_IDC_STAT_TACHYTHERAPY_TOTAL_DTM_START: NORMAL

## 2020-08-24 ENCOUNTER — TRANSFERRED RECORDS (OUTPATIENT)
Dept: HEALTH INFORMATION MANAGEMENT | Facility: CLINIC | Age: 65
End: 2020-08-24

## 2020-09-02 NOTE — PROGRESS NOTES
Pt seen in the [facility] for evaluation and iterative programming of a [employer], single lead ICD, per MD orders. He also has an appointment with Jeanie Hernandez NP. Today his intrinsic rhythm is a regular ventricular rhythm @ 80 bpm. Normal ICD function. No arrhythmias recorded. = 0%. Pt reports that he is feeling well. Battery estimates 7 years to CHARISSA. Plan for pt to RTC in 4 months.       independent

## 2020-10-05 DIAGNOSIS — I50.22 CHRONIC SYSTOLIC CONGESTIVE HEART FAILURE (H): Primary | ICD-10-CM

## 2020-10-08 ENCOUNTER — ANCILLARY PROCEDURE (OUTPATIENT)
Dept: CARDIOLOGY | Facility: CLINIC | Age: 65
End: 2020-10-08
Attending: INTERNAL MEDICINE
Payer: COMMERCIAL

## 2020-10-08 ENCOUNTER — OFFICE VISIT (OUTPATIENT)
Dept: CARDIOLOGY | Facility: CLINIC | Age: 65
End: 2020-10-08
Attending: INTERNAL MEDICINE
Payer: MEDICARE

## 2020-10-08 VITALS
BODY MASS INDEX: 22.29 KG/M2 | OXYGEN SATURATION: 98 % | HEART RATE: 74 BPM | HEIGHT: 67 IN | WEIGHT: 142 LBS | SYSTOLIC BLOOD PRESSURE: 108 MMHG | DIASTOLIC BLOOD PRESSURE: 80 MMHG

## 2020-10-08 DIAGNOSIS — I25.5 ISCHEMIC CARDIOMYOPATHY: ICD-10-CM

## 2020-10-08 DIAGNOSIS — I50.22 CHRONIC SYSTOLIC CONGESTIVE HEART FAILURE (H): ICD-10-CM

## 2020-10-08 LAB
MDC_IDC_LEAD_IMPLANT_DT: NORMAL
MDC_IDC_LEAD_LOCATION: NORMAL
MDC_IDC_LEAD_LOCATION_DETAIL_1: NORMAL
MDC_IDC_LEAD_MFG: NORMAL
MDC_IDC_LEAD_MODEL: NORMAL
MDC_IDC_LEAD_POLARITY_TYPE: NORMAL
MDC_IDC_LEAD_SERIAL: NORMAL
MDC_IDC_MSMT_BATTERY_DTM: NORMAL
MDC_IDC_MSMT_BATTERY_REMAINING_LONGEVITY: 42 MO
MDC_IDC_MSMT_BATTERY_STATUS: NORMAL
MDC_IDC_MSMT_CAP_CHARGE_DTM: NORMAL
MDC_IDC_MSMT_CAP_CHARGE_ENERGY: 1.1 J
MDC_IDC_MSMT_CAP_CHARGE_TIME: 0.27
MDC_IDC_MSMT_CAP_CHARGE_TIME: 10.3 S
MDC_IDC_MSMT_CAP_CHARGE_TYPE: NORMAL
MDC_IDC_MSMT_CAP_CHARGE_TYPE: NORMAL
MDC_IDC_MSMT_LEADCHNL_RV_IMPEDANCE_VALUE: 642 OHM
MDC_IDC_MSMT_LEADCHNL_RV_PACING_THRESHOLD_AMPLITUDE: 0.9 V
MDC_IDC_MSMT_LEADCHNL_RV_PACING_THRESHOLD_PULSEWIDTH: 0.5 MS
MDC_IDC_MSMT_LEADCHNL_RV_SENSING_INTR_AMPL: 22.1 MV
MDC_IDC_PG_IMPLANT_DTM: NORMAL
MDC_IDC_PG_MFG: NORMAL
MDC_IDC_PG_MODEL: NORMAL
MDC_IDC_PG_SERIAL: NORMAL
MDC_IDC_PG_TYPE: NORMAL
MDC_IDC_SESS_CLINIC_NAME: NORMAL
MDC_IDC_SESS_DTM: NORMAL
MDC_IDC_SESS_TYPE: NORMAL
MDC_IDC_SET_BRADY_HYSTRATE: NORMAL
MDC_IDC_SET_BRADY_LOWRATE: 40 {BEATS}/MIN
MDC_IDC_SET_BRADY_MODE: NORMAL
MDC_IDC_SET_LEADCHNL_RV_PACING_AMPLITUDE: 2.4 V
MDC_IDC_SET_LEADCHNL_RV_PACING_CAPTURE_MODE: NORMAL
MDC_IDC_SET_LEADCHNL_RV_PACING_POLARITY: NORMAL
MDC_IDC_SET_LEADCHNL_RV_PACING_PULSEWIDTH: 0.5 MS
MDC_IDC_SET_LEADCHNL_RV_SENSING_ADAPTATION_MODE: NORMAL
MDC_IDC_SET_LEADCHNL_RV_SENSING_POLARITY: NORMAL
MDC_IDC_SET_LEADCHNL_RV_SENSING_SENSITIVITY: 0.6 MV
MDC_IDC_SET_ZONE_DETECTION_INTERVAL: 273 MS
MDC_IDC_SET_ZONE_DETECTION_INTERVAL: 333 MS
MDC_IDC_SET_ZONE_TYPE: NORMAL
MDC_IDC_SET_ZONE_VENDOR_TYPE: NORMAL
MDC_IDC_STAT_BRADY_DTM_END: NORMAL
MDC_IDC_STAT_BRADY_DTM_START: NORMAL
MDC_IDC_STAT_BRADY_RV_PERCENT_PACED: 1 %
MDC_IDC_STAT_EPISODE_RECENT_COUNT: 0
MDC_IDC_STAT_EPISODE_RECENT_COUNT: 0
MDC_IDC_STAT_EPISODE_RECENT_COUNT: 1
MDC_IDC_STAT_EPISODE_RECENT_COUNT_DTM_END: NORMAL
MDC_IDC_STAT_EPISODE_RECENT_COUNT_DTM_START: NORMAL
MDC_IDC_STAT_EPISODE_TOTAL_COUNT: 0
MDC_IDC_STAT_EPISODE_TOTAL_COUNT: 0
MDC_IDC_STAT_EPISODE_TOTAL_COUNT: 20
MDC_IDC_STAT_EPISODE_TOTAL_COUNT_DTM_END: NORMAL
MDC_IDC_STAT_EPISODE_TYPE: NORMAL
MDC_IDC_STAT_EPISODE_VENDOR_TYPE: NORMAL
MDC_IDC_STAT_TACHYTHERAPY_ATP_DELIVERED_RECENT: 0
MDC_IDC_STAT_TACHYTHERAPY_ATP_DELIVERED_TOTAL: 0
MDC_IDC_STAT_TACHYTHERAPY_RECENT_DTM_END: NORMAL
MDC_IDC_STAT_TACHYTHERAPY_RECENT_DTM_START: NORMAL
MDC_IDC_STAT_TACHYTHERAPY_SHOCKS_ABORTED_RECENT: 0
MDC_IDC_STAT_TACHYTHERAPY_SHOCKS_ABORTED_TOTAL: 0
MDC_IDC_STAT_TACHYTHERAPY_SHOCKS_DELIVERED_RECENT: 0
MDC_IDC_STAT_TACHYTHERAPY_SHOCKS_DELIVERED_TOTAL: 1
MDC_IDC_STAT_TACHYTHERAPY_TOTAL_DTM_END: NORMAL

## 2020-10-08 PROCEDURE — G0463 HOSPITAL OUTPT CLINIC VISIT: HCPCS | Mod: 25

## 2020-10-08 PROCEDURE — 93306 TTE W/DOPPLER COMPLETE: CPT | Performed by: INTERNAL MEDICINE

## 2020-10-08 PROCEDURE — 99207 PR STATISTIC IV PUSH SINGLE INITIAL SUBSTANCE: CPT | Performed by: INTERNAL MEDICINE

## 2020-10-08 PROCEDURE — 99214 OFFICE O/P EST MOD 30 MIN: CPT | Performed by: INTERNAL MEDICINE

## 2020-10-08 RX ADMIN — Medication 5 ML: at 14:00

## 2020-10-08 ASSESSMENT — PAIN SCALES - GENERAL: PAINLEVEL: NO PAIN (0)

## 2020-10-08 ASSESSMENT — MIFFLIN-ST. JEOR: SCORE: 1392.74

## 2020-10-08 NOTE — PATIENT INSTRUCTIONS
Return to clinic in 1 year with ABBI Sargent       With echo at that time.     Let Dr. Maynard know if you have   1) weight loss 2) more shortness of breath 3) more fatigue     Any of the above call Isabell Maynard MD

## 2020-10-08 NOTE — PROGRESS NOTES
"[facility]    2020     RE: Matt Hester   MRN: [id number]   : 1955      Dear Colleagues:        We had the pleasure of seeing Mr. Matt Hester in the [facility] today.    65 year-old man with a long-standing history of ischemic cardiomyopathy, and I will detail his cardiac history below.  He has been followed in [place] and was previously seeing Jun Velazquez as well as one of our nurse practitioners, who has now moved here, Jeanie Hernandez.  When he was first diagnosed, his ejection fraction was in the 10%-15% range.  He has now been stable in the 25% range with \"an excellent quality of life\" for many years.      His cardiac history is summarized as follows:  His first MI was in .  In , he had an anterior wall MI requiring stenting.  He had a single-chamber ICD implanted in 2004 for primary prevention and a generator change on 2010 and again in 2011.  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  He has regained function of his left arm but his visual deficit persists.    Today he continues to feel quite well. He has lost about 10 -15 lbs in the past 2 years due to eating better (less calories) and getting a little more exercise. 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 Lost vision in his left eye at the time of his stroke.     No orthopnea, PND, chest pain, palpitations, syncope, ICD shocks. No hospitalizations since I saw him last.      About a year ago he had a recurrent LV thrombus for which we put him on Coumadin and aspirin along for 6 months.  He had a follow-up echo in [place] that showed resolution and therefore he is back on aspirin and Plavix presently.     He did have an episode of shortness of breath and " chest heaviness in the wintertime of 2020 therefore underwent a stress test that was negative.  The symptoms have not returned and he has been feeling quite well.   He did lose some weight intentionally over the last several years  But has been steady at his current weight for the last year.      PAST MEDICAL HISTORY:  Past Medical History   Diagnosis Date     Chronic systolic congestive heart failure (H)      Ischemic cardiomyopathy      ICD (implantable cardioverter-defibrillator) in place      HTN (hypertension)      Hyperlipidemia      Peripheral vascular disease (H)      CVA (cerebral vascular accident) (H)      Atrial fibrillation (H), paroxysmal     TIA     Other medical history includes   - colonic polyp in 11/2010   - atrophic gastritis  -  Haro esophagus   - benign neoplasm of the colon in 2011   - chronic abdominal pain  -  diverticulosis, bile duct hematoma per liver biopsy,  - colectomy 01/05/2011  - a stroke in 10/2011   - right occipital residual vision changes  - Hypertension   - nasal polyp   - ICD placement in 06/2011 for primary prevention.      ALLERGIES:     1.  Aggrenox, severe nausea.   2.  Alcohol, hives.   3.  Effient, rash and itching.   4.  Demerol, itching, nausea and vomiting.   5.  Flagyl.   6.  Levaquin.     SOCIAL HISTORY:  The patient works full-time in the construction business.  He does not drink, smoke or use drugs, and he never has.  [place].  He has 1 son and a grandson that he is very close with and now another grand daughter      CURRENT MEDICATIONS:  Current Outpatient Medications   Medication Sig Dispense Refill     carvedilol (COREG) 12.5 MG tablet Take 1.5 tablets (18.75 mg) by mouth 2 times daily (with meals) 270 tablet 2     Clopidogrel Bisulfate (PLAVIX PO) Take 75 mg by mouth daily       enalapril (VASOTEC) 5 MG tablet Take 1 tablet (5 mg) by mouth 2 times daily 30 tablet 1     lidocaine VISCOUS (XYLOCAINE) 2 % solution        Magic  "Mouthwash (FV std formula) lidocaine visc 2% 2.5mL/5mL & maalox/mylanta w/ simeth 2.5mL/5mL & diphenhydrAMINE 5mg/5mL Swish and swallow 10 mLs in mouth every 6 hours as needed for mouth sores       potassium chloride ER (KLOR-CON M) 10 MEQ CR tablet Take 1 tablet by mouth three times per week.       Rosuvastatin Calcium (CRESTOR PO) Take 40 mg by mouth daily       SPIRONOLACTONE PO Take 12.5 mg by mouth daily       Furosemide (LASIX PO) Take 20 mg by mouth         ROS:   Constitutional: No fever, chills, or sweats. Intentional weight loss 15 lbs.   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  No ear ache, epistaxis, sore throat.   Allergies/Immunologic: Negative.   Respiratory: No cough, hemoptysis.   Cardiovascular: As per HPI.   GI: No nausea, vomiting, hematemesis, melena, or hematochezia.  : No urinary frequency, dysuria, or hematuria.   Integument: Negative.   Psychiatric: Negative.   Neuro: Negative.   Endocrinology: Negative.   Musculoskeletal: Negative.    EXAM:  /80 (BP Location: Right arm, Patient Position: Sitting, Cuff Size: Adult Regular)   Pulse 74   Ht 1.702 m (5' 7\")   Wt 64.4 kg (142 lb)   SpO2 98%   BMI 22.24 kg/m    General: appears comfortable, alert and articulate  Head: normocephalic, atraumatic  Eyes: anicteric sclera, EOMI  Neck: no adenopathy  Orophyarynx: moist mucosa, no lesions, dentition intact  Heart: regular, S1/S2, no murmur, gallop, rub, estimated JVP  <8 without HJR  Lungs: clear, no rales or wheezing  Abdomen: soft, non-tender, bowel sounds present, no hepatosplenomegaly  Extremities: no clubbing, cyanosis or edema  Neurological: normal speech and affect, no gross motor deficits        Labs: Stable renal function, very low NT proBNP      1. Widely patent stents in the ramus intermedius and LAD.   2. Two-vessel coronary artery disease.  3. Moderate left ventricular chamber enlargement.   4. Left ventricular systolic function is severely reduced with a visually estimated ejection fraction " of 15 to 20% and global hypokinesis.   5. Anterior apical and inferior akinesis.   6. Possible left ventricular thrombus.     Right heart catheterization from 01/24/2014 shows an RA of 8, RV 30/8, PA 29/16 with a wedge of 11, cardiac output 3.95 L/min, body surface area of 1.87 giving a normal cardiac index, PA saturation of 76%, normal pulmonary vascular resistance.       echo from today     Interpretation Summary  Ischemic CM. LV apical mural thrombus is probably still present.  Moderately (EF 35-40%) reduced left ventricular function is present.  Right ventricular function, chamber size, wall motion, and thickness are  normal.  IVC diameter <2.1 cm collapsing >50% with sniff suggests a normal RA pressure  of 3 mmHg.  Trivial pericardial effusion is present.     Compared to prior, LV fxn appears improved.      Assessment and Plan:   In summary, this is a very pleasant, 65year-old man with a long-standing history of ischemic cardiomyopathy with an ejection fraction in the 25% range (now 35 %) who has been stable on medical therapy for many years with an ICD for primary prevention.      Today, he appears euvolemic without evidence of volume overload and has been without any heart failure hospitalizations.  He is NYHA class II, stage C today.      He is on an excellent medication regimen including high dose statin, Crestor 40 mg daily, enalapril 5 mg twice daily, a full dose aspirin, coreg, aldactone and    For his CAD; he is on statin, ASA -recent negative stress test, not having any angina    Remote CVA: on ASA/plavix - last work up negative (in 2016 for TIA) for cause although there is a possible LV thrombus on his echo in the last year.  He did have another 6 months of Coumadin-and is now again back on aspirin and Plavix.  They do not definitively see the LV thrombus today although I cannot rule it out.   My preference given the history of stroke would be to have him on Coumadin and aspirin again.  He is so  incredibly functional it would be disastrous for him to have another stroke.     With regard to his HF- should he develop symptoms may next move would be to change him from enalapril to Entresto.  He has not wanted to change to Entresto now as he feels his blood pressure to be too low.  We will continue to address at the next visit    With regard to prevention of sudden cardiac death, ICD present .     Return to clinic in 1 year with an echo with Edel, I will see him the following year after that    I have warned him about signs that we would need to be seen sooner such as unintentional weight loss, fatigue or more shortness of breath      Kimberly Maynard MD   of Medicine   [facility] Division of Cardiology         CC  Patient Care Team:  Maykel Barber MD as PCP - General (Internal Medicine)  Kimberly Maynard MD as MD (Cardiology)  Edel Hernandez, APRN CNP as Nurse Practitioner (Nurse Practitioner - Gerontology)  Kristen Obrien, RN as Specialty Care Coordinator (Cardiology)  Isabell Steel, RN as Registered Nurse (Cardiology)  EDEL HERNANDEZ

## 2020-10-08 NOTE — NURSING NOTE
Chief Complaint   Patient presents with     Follow Up     Yearly FU           Vitals were taken and medications were reconciled.     Leah Pena  2:54 PM

## 2020-10-08 NOTE — LETTER
"10/8/2020      RE: Matt Hester  [address]  [facility] [address]       Dear Colleague,    Thank you for the opportunity to participate in the care of your patient, Matt Hester, at the [facility] [place] [facility]. Please see a copy of my visit note below.    [facility]    2020     RE: Matt Hester   MRN: [id number]   : 1955      Dear Colleagues:        We had the pleasure of seeing Mr. Matt Hester in the [facility] today.    65 year-old man with a long-standing history of ischemic cardiomyopathy, and I will detail his cardiac history below.  He has been followed in [place] and was previously seeing Jun Velazquez as well as one of our nurse practitioners, who has now moved here, Jeanie Hernandez.  When he was first diagnosed, his ejection fraction was in the 10%-15% range.  He has now been stable in the 25% range with \"an excellent quality of life\" for many years.      His cardiac history is summarized as follows:  His first MI was in .  In , he had an anterior wall MI requiring stenting.  He had a single-chamber ICD implanted in 2004 for primary prevention and a generator change on 2010 and again in 2011.  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  He has regained function of his left arm but his visual deficit persists.    Today he continues to feel quite well. He has lost about 10 -15 lbs in the past 2 years due to eating better (less calories) and getting a little more exercise. 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 Lost vision in his left eye at the time of his stroke.     No orthopnea, PND, chest pain, palpitations, syncope, ICD shocks. " No hospitalizations since I saw him last.      About a year ago he had a recurrent LV thrombus for which we put him on Coumadin and aspirin along for 6 months.  He had a follow-up echo in [place] that showed resolution and therefore he is back on aspirin and Plavix presently.     He did have an episode of shortness of breath and chest heaviness in the wintertime of 2020 therefore underwent a stress test that was negative.  The symptoms have not returned and he has been feeling quite well.   He did lose some weight intentionally over the last several years  But has been steady at his current weight for the last year.      PAST MEDICAL HISTORY:  Past Medical History   Diagnosis Date     Chronic systolic congestive heart failure (H)      Ischemic cardiomyopathy      ICD (implantable cardioverter-defibrillator) in place      HTN (hypertension)      Hyperlipidemia      Peripheral vascular disease (H)      CVA (cerebral vascular accident) (H)      Atrial fibrillation (H), paroxysmal     TIA     Other medical history includes   - colonic polyp in 11/2010   - atrophic gastritis  -  Haro esophagus   - benign neoplasm of the colon in 2011   - chronic abdominal pain  -  diverticulosis, bile duct hematoma per liver biopsy,  - colectomy 01/05/2011  - a stroke in 10/2011   - right occipital residual vision changes  - Hypertension   - nasal polyp   - ICD placement in 06/2011 for primary prevention.      ALLERGIES:     1.  Aggrenox, severe nausea.   2.  Alcohol, hives.   3.  Effient, rash and itching.   4.  Demerol, itching, nausea and vomiting.   5.  Flagyl.   6.  Levaquin.     SOCIAL HISTORY:  The patient works full-time in the construction business.  He does not drink, smoke or use drugs, and he never has.  [place] of [place].  He has 1 son and a grandson that he is very close with and now another grand daughter      CURRENT MEDICATIONS:  Current Outpatient Medications   Medication Sig Dispense Refill      "carvedilol (COREG) 12.5 MG tablet Take 1.5 tablets (18.75 mg) by mouth 2 times daily (with meals) 270 tablet 2     Clopidogrel Bisulfate (PLAVIX PO) Take 75 mg by mouth daily       enalapril (VASOTEC) 5 MG tablet Take 1 tablet (5 mg) by mouth 2 times daily 30 tablet 1     lidocaine VISCOUS (XYLOCAINE) 2 % solution        Magic Mouthwash (FV std formula) lidocaine visc 2% 2.5mL/5mL & maalox/mylanta w/ simeth 2.5mL/5mL & diphenhydrAMINE 5mg/5mL Swish and swallow 10 mLs in mouth every 6 hours as needed for mouth sores       potassium chloride ER (KLOR-CON M) 10 MEQ CR tablet Take 1 tablet by mouth three times per week.       Rosuvastatin Calcium (CRESTOR PO) Take 40 mg by mouth daily       SPIRONOLACTONE PO Take 12.5 mg by mouth daily       Furosemide (LASIX PO) Take 20 mg by mouth         ROS:   Constitutional: No fever, chills, or sweats. Intentional weight loss 15 lbs.   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  No ear ache, epistaxis, sore throat.   Allergies/Immunologic: Negative.   Respiratory: No cough, hemoptysis.   Cardiovascular: As per HPI.   GI: No nausea, vomiting, hematemesis, melena, or hematochezia.  : No urinary frequency, dysuria, or hematuria.   Integument: Negative.   Psychiatric: Negative.   Neuro: Negative.   Endocrinology: Negative.   Musculoskeletal: Negative.    EXAM:  /80 (BP Location: Right arm, Patient Position: Sitting, Cuff Size: Adult Regular)   Pulse 74   Ht 1.702 m (5' 7\")   Wt 64.4 kg (142 lb)   SpO2 98%   BMI 22.24 kg/m    General: appears comfortable, alert and articulate  Head: normocephalic, atraumatic  Eyes: anicteric sclera, EOMI  Neck: no adenopathy  Orophyarynx: moist mucosa, no lesions, dentition intact  Heart: regular, S1/S2, no murmur, gallop, rub, estimated JVP  <8 without HJR  Lungs: clear, no rales or wheezing  Abdomen: soft, non-tender, bowel sounds present, no hepatosplenomegaly  Extremities: no clubbing, cyanosis or edema  Neurological: normal speech and affect, no gross " motor deficits        Labs: Stable renal function, very low NT proBNP      1. Widely patent stents in the ramus intermedius and LAD.   2. Two-vessel coronary artery disease.  3. Moderate left ventricular chamber enlargement.   4. Left ventricular systolic function is severely reduced with a visually estimated ejection fraction of 15 to 20% and global hypokinesis.   5. Anterior apical and inferior akinesis.   6. Possible left ventricular thrombus.     Right heart catheterization from 01/24/2014 shows an RA of 8, RV 30/8, PA 29/16 with a wedge of 11, cardiac output 3.95 L/min, body surface area of 1.87 giving a normal cardiac index, PA saturation of 76%, normal pulmonary vascular resistance.       echo from today     Interpretation Summary  Ischemic CM. LV apical mural thrombus is probably still present.  Moderately (EF 35-40%) reduced left ventricular function is present.  Right ventricular function, chamber size, wall motion, and thickness are  normal.  IVC diameter <2.1 cm collapsing >50% with sniff suggests a normal RA pressure  of 3 mmHg.  Trivial pericardial effusion is present.     Compared to prior, LV fxn appears improved.      Assessment and Plan:   In summary, this is a very pleasant, 65year-old man with a long-standing history of ischemic cardiomyopathy with an ejection fraction in the 25% range (now 35 %) who has been stable on medical therapy for many years with an ICD for primary prevention.      Today, he appears euvolemic without evidence of volume overload and has been without any heart failure hospitalizations.  He is NYHA class II, stage C today.      He is on an excellent medication regimen including high dose statin, Crestor 40 mg daily, enalapril 5 mg twice daily, a full dose aspirin, coreg, aldactone and    For his CAD; he is on statin, ASA -recent negative stress test, not having any angina    Remote CVA: on ASA/plavix - last work up negative (in 2016 for TIA) for cause although there is a  possible LV thrombus on his echo in the last year.  He did have another 6 months of Coumadin-and is now again back on aspirin and Plavix.  They do not definitively see the LV thrombus today although I cannot rule it out.   My preference given the history of stroke would be to have him on Coumadin and aspirin again.  He is so incredibly functional it would be disastrous for him to have another stroke.     With regard to his HF- should he develop symptoms may next move would be to change him from enalapril to Entresto.  He has not wanted to change to Entresto now as he feels his blood pressure to be too low.  We will continue to address at the next visit    With regard to prevention of sudden cardiac death, ICD present .     Return to clinic in 1 year with an echo with Edel, I will see him the following year after that    I have warned him about signs that we would need to be seen sooner such as unintentional weight loss, fatigue or more shortness of breath      Kimberly Maynard MD   of Medicine   [facility] Division of Cardiology         CC  Patient Care Team:  Maykel Barber MD as PCP - General (Internal Medicine)  Caesar, Kimberly Ng MD as MD (Cardiology)  Edel Hernandez, APRN CNP as Nurse Practitioner (Nurse Practitioner - Gerontology)  Kristen Obrien, RN as Specialty Care Coordinator (Cardiology)  Isabell Steel, RN as Registered Nurse (Cardiology)  EDEL HERNANDEZ      Please do not hesitate to contact me if you have any questions/concerns.     Sincerely,     Kimberly Maynard MD

## 2020-10-09 ENCOUNTER — PATIENT OUTREACH (OUTPATIENT)
Dept: CARDIOLOGY | Facility: CLINIC | Age: 65
End: 2020-10-09

## 2020-10-09 DIAGNOSIS — Z79.01 LONG TERM CURRENT USE OF ANTICOAGULANT THERAPY: ICD-10-CM

## 2020-10-09 DIAGNOSIS — I51.3 LV (LEFT VENTRICULAR) MURAL THROMBUS: Primary | ICD-10-CM

## 2020-10-09 NOTE — TELEPHONE ENCOUNTER
Date: 10/9/2020    Time of Call: 5:08 PM     Diagnosis:  LV Thromb     [ VORB ] Ordering provider: Dr Maynard    Order: INR monitoring for LV thrombus, INR 2-3, stop plavix     Order received by: Isabell Steel RN       Follow-up/additional notes: updated Matt, he has been on coumadin in the past. Requested it be sent to [facility] clinic at [facility] in [facility].

## 2020-10-12 ENCOUNTER — TELEPHONE (OUTPATIENT)
Dept: CARDIOLOGY | Facility: CLINIC | Age: 65
End: 2020-10-12

## 2020-10-12 ENCOUNTER — DOCUMENTATION ONLY (OUTPATIENT)
Dept: NURSING | Facility: CLINIC | Age: 65
End: 2020-10-12

## 2020-10-12 DIAGNOSIS — Z79.01 LONG TERM CURRENT USE OF ANTICOAGULANT THERAPY: ICD-10-CM

## 2020-10-12 NOTE — PROGRESS NOTES
Anticoagulation referral received from cardiology. Chart reviewed. Patient lives [facility] and referral has been sent per cardiology to his pcp clinic at [facility]. No further contact needed by this nurse. Jolene Hanks RN

## 2020-10-12 NOTE — TELEPHONE ENCOUNTER
[facility] Call Center    Phone Message    May a detailed message be left on voicemail: yes     Reason for Call: Other: Pt wants to know if he should stay on plavix or not when he goes on the coumadin. Please call back to discuss,     Action Taken: Message routed to:  [facility] cardio    Travel Screening: Not Applicable

## 2020-10-12 NOTE — TELEPHONE ENCOUNTER
Connected with [facility] clinic at [facility].  Faxed INR referral, spoke with Chelsey SHERMAN RN with [facility], she will reach out to Matt today and get him started.

## 2020-12-20 ENCOUNTER — HEALTH MAINTENANCE LETTER (OUTPATIENT)
Age: 65
End: 2020-12-20

## 2021-01-09 ENCOUNTER — ANCILLARY PROCEDURE (OUTPATIENT)
Dept: CARDIOLOGY | Facility: CLINIC | Age: 66
End: 2021-01-09
Attending: INTERNAL MEDICINE
Payer: MEDICARE

## 2021-01-09 DIAGNOSIS — I25.5 ISCHEMIC CARDIOMYOPATHY: ICD-10-CM

## 2021-01-09 PROCEDURE — 93295 DEV INTERROG REMOTE 1/2/MLT: CPT | Performed by: INTERNAL MEDICINE

## 2021-01-09 PROCEDURE — 93296 REM INTERROG EVL PM/IDS: CPT

## 2021-01-19 LAB
MDC_IDC_EPISODE_DTM: NORMAL
MDC_IDC_EPISODE_DURATION: 9 S
MDC_IDC_EPISODE_ID: NORMAL
MDC_IDC_EPISODE_TYPE: NORMAL
MDC_IDC_LEAD_IMPLANT_DT: NORMAL
MDC_IDC_LEAD_LOCATION: NORMAL
MDC_IDC_LEAD_LOCATION_DETAIL_1: NORMAL
MDC_IDC_LEAD_MFG: NORMAL
MDC_IDC_LEAD_MODEL: NORMAL
MDC_IDC_LEAD_POLARITY_TYPE: NORMAL
MDC_IDC_LEAD_SERIAL: NORMAL
MDC_IDC_MSMT_BATTERY_DTM: NORMAL
MDC_IDC_MSMT_BATTERY_REMAINING_LONGEVITY: 36 MO
MDC_IDC_MSMT_BATTERY_REMAINING_PERCENTAGE: 41 %
MDC_IDC_MSMT_BATTERY_STATUS: NORMAL
MDC_IDC_MSMT_CAP_CHARGE_DTM: NORMAL
MDC_IDC_MSMT_CAP_CHARGE_DTM: NORMAL
MDC_IDC_MSMT_CAP_CHARGE_ENERGY: 1 J
MDC_IDC_MSMT_CAP_CHARGE_TIME: 0.3 S
MDC_IDC_MSMT_CAP_CHARGE_TIME: 10.4 S
MDC_IDC_MSMT_CAP_CHARGE_TYPE: NORMAL
MDC_IDC_MSMT_CAP_CHARGE_TYPE: NORMAL
MDC_IDC_MSMT_LEADCHNL_RV_IMPEDANCE_VALUE: 625 OHM
MDC_IDC_MSMT_LEADCHNL_RV_PACING_THRESHOLD_AMPLITUDE: 0.9 V
MDC_IDC_MSMT_LEADCHNL_RV_PACING_THRESHOLD_PULSEWIDTH: 0.5 MS
MDC_IDC_PG_IMPLANT_DTM: NORMAL
MDC_IDC_PG_MFG: NORMAL
MDC_IDC_PG_MODEL: NORMAL
MDC_IDC_PG_SERIAL: NORMAL
MDC_IDC_PG_TYPE: NORMAL
MDC_IDC_SESS_CLINIC_NAME: NORMAL
MDC_IDC_SESS_DTM: NORMAL
MDC_IDC_SESS_TYPE: NORMAL
MDC_IDC_SET_BRADY_LOWRATE: 40 {BEATS}/MIN
MDC_IDC_SET_BRADY_MODE: NORMAL
MDC_IDC_SET_LEADCHNL_RV_PACING_AMPLITUDE: 2.4 V
MDC_IDC_SET_LEADCHNL_RV_PACING_POLARITY: NORMAL
MDC_IDC_SET_LEADCHNL_RV_PACING_PULSEWIDTH: 0.5 MS
MDC_IDC_SET_LEADCHNL_RV_SENSING_ADAPTATION_MODE: NORMAL
MDC_IDC_SET_LEADCHNL_RV_SENSING_POLARITY: NORMAL
MDC_IDC_SET_LEADCHNL_RV_SENSING_SENSITIVITY: 0.6 MV
MDC_IDC_SET_ZONE_DETECTION_INTERVAL: 273 MS
MDC_IDC_SET_ZONE_DETECTION_INTERVAL: 333 MS
MDC_IDC_SET_ZONE_TYPE: NORMAL
MDC_IDC_SET_ZONE_TYPE: NORMAL
MDC_IDC_SET_ZONE_VENDOR_TYPE: NORMAL
MDC_IDC_SET_ZONE_VENDOR_TYPE: NORMAL
MDC_IDC_STAT_BRADY_DTM_END: NORMAL
MDC_IDC_STAT_BRADY_DTM_START: NORMAL
MDC_IDC_STAT_BRADY_RV_PERCENT_PACED: 0 %
MDC_IDC_STAT_EPISODE_RECENT_COUNT: 0
MDC_IDC_STAT_EPISODE_RECENT_COUNT: 1
MDC_IDC_STAT_EPISODE_RECENT_COUNT_DTM_END: NORMAL
MDC_IDC_STAT_EPISODE_RECENT_COUNT_DTM_START: NORMAL
MDC_IDC_STAT_EPISODE_TYPE: NORMAL
MDC_IDC_STAT_EPISODE_VENDOR_TYPE: NORMAL
MDC_IDC_STAT_TACHYTHERAPY_ATP_DELIVERED_RECENT: 0
MDC_IDC_STAT_TACHYTHERAPY_ATP_DELIVERED_TOTAL: 0
MDC_IDC_STAT_TACHYTHERAPY_RECENT_DTM_END: NORMAL
MDC_IDC_STAT_TACHYTHERAPY_RECENT_DTM_START: NORMAL
MDC_IDC_STAT_TACHYTHERAPY_SHOCKS_ABORTED_RECENT: 0
MDC_IDC_STAT_TACHYTHERAPY_SHOCKS_ABORTED_TOTAL: 0
MDC_IDC_STAT_TACHYTHERAPY_SHOCKS_DELIVERED_RECENT: 0
MDC_IDC_STAT_TACHYTHERAPY_SHOCKS_DELIVERED_TOTAL: 1
MDC_IDC_STAT_TACHYTHERAPY_TOTAL_DTM_END: NORMAL
MDC_IDC_STAT_TACHYTHERAPY_TOTAL_DTM_START: NORMAL

## 2021-02-02 ENCOUNTER — TRANSFERRED RECORDS (OUTPATIENT)
Dept: HEALTH INFORMATION MANAGEMENT | Facility: CLINIC | Age: 66
End: 2021-02-02

## 2021-02-28 ENCOUNTER — HEALTH MAINTENANCE LETTER (OUTPATIENT)
Age: 66
End: 2021-02-28

## 2021-04-16 ENCOUNTER — ANCILLARY PROCEDURE (OUTPATIENT)
Dept: CARDIOLOGY | Facility: CLINIC | Age: 66
End: 2021-04-16
Attending: INTERNAL MEDICINE
Payer: MEDICARE

## 2021-04-16 DIAGNOSIS — I25.5 ISCHEMIC CARDIOMYOPATHY: ICD-10-CM

## 2021-04-16 PROCEDURE — 93296 REM INTERROG EVL PM/IDS: CPT

## 2021-04-16 PROCEDURE — 93295 DEV INTERROG REMOTE 1/2/MLT: CPT | Performed by: INTERNAL MEDICINE

## 2021-04-21 LAB
MDC_IDC_EPISODE_DTM: NORMAL
MDC_IDC_EPISODE_DURATION: 9 S
MDC_IDC_EPISODE_ID: NORMAL
MDC_IDC_EPISODE_TYPE: NORMAL
MDC_IDC_LEAD_IMPLANT_DT: NORMAL
MDC_IDC_LEAD_LOCATION: NORMAL
MDC_IDC_LEAD_LOCATION_DETAIL_1: NORMAL
MDC_IDC_LEAD_MFG: NORMAL
MDC_IDC_LEAD_MODEL: NORMAL
MDC_IDC_LEAD_POLARITY_TYPE: NORMAL
MDC_IDC_LEAD_SERIAL: NORMAL
MDC_IDC_MSMT_BATTERY_DTM: NORMAL
MDC_IDC_MSMT_BATTERY_REMAINING_LONGEVITY: 36 MO
MDC_IDC_MSMT_BATTERY_REMAINING_PERCENTAGE: 40 %
MDC_IDC_MSMT_BATTERY_STATUS: NORMAL
MDC_IDC_MSMT_CAP_CHARGE_DTM: NORMAL
MDC_IDC_MSMT_CAP_CHARGE_DTM: NORMAL
MDC_IDC_MSMT_CAP_CHARGE_ENERGY: 1 J
MDC_IDC_MSMT_CAP_CHARGE_TIME: 0.3 S
MDC_IDC_MSMT_CAP_CHARGE_TIME: 10.5 S
MDC_IDC_MSMT_CAP_CHARGE_TYPE: NORMAL
MDC_IDC_MSMT_CAP_CHARGE_TYPE: NORMAL
MDC_IDC_MSMT_LEADCHNL_RV_IMPEDANCE_VALUE: 620 OHM
MDC_IDC_MSMT_LEADCHNL_RV_PACING_THRESHOLD_AMPLITUDE: 0.9 V
MDC_IDC_MSMT_LEADCHNL_RV_PACING_THRESHOLD_PULSEWIDTH: 0.5 MS
MDC_IDC_PG_IMPLANT_DTM: NORMAL
MDC_IDC_PG_MFG: NORMAL
MDC_IDC_PG_MODEL: NORMAL
MDC_IDC_PG_SERIAL: NORMAL
MDC_IDC_PG_TYPE: NORMAL
MDC_IDC_SESS_CLINIC_NAME: NORMAL
MDC_IDC_SESS_DTM: NORMAL
MDC_IDC_SESS_TYPE: NORMAL
MDC_IDC_SET_BRADY_LOWRATE: 40 {BEATS}/MIN
MDC_IDC_SET_BRADY_MODE: NORMAL
MDC_IDC_SET_LEADCHNL_RV_PACING_AMPLITUDE: 2.4 V
MDC_IDC_SET_LEADCHNL_RV_PACING_POLARITY: NORMAL
MDC_IDC_SET_LEADCHNL_RV_PACING_PULSEWIDTH: 0.5 MS
MDC_IDC_SET_LEADCHNL_RV_SENSING_ADAPTATION_MODE: NORMAL
MDC_IDC_SET_LEADCHNL_RV_SENSING_POLARITY: NORMAL
MDC_IDC_SET_LEADCHNL_RV_SENSING_SENSITIVITY: 0.6 MV
MDC_IDC_SET_ZONE_DETECTION_INTERVAL: 273 MS
MDC_IDC_SET_ZONE_DETECTION_INTERVAL: 333 MS
MDC_IDC_SET_ZONE_TYPE: NORMAL
MDC_IDC_SET_ZONE_TYPE: NORMAL
MDC_IDC_SET_ZONE_VENDOR_TYPE: NORMAL
MDC_IDC_SET_ZONE_VENDOR_TYPE: NORMAL
MDC_IDC_STAT_BRADY_DTM_END: NORMAL
MDC_IDC_STAT_BRADY_DTM_START: NORMAL
MDC_IDC_STAT_BRADY_RV_PERCENT_PACED: 0 %
MDC_IDC_STAT_EPISODE_RECENT_COUNT: 0
MDC_IDC_STAT_EPISODE_RECENT_COUNT: 2
MDC_IDC_STAT_EPISODE_RECENT_COUNT_DTM_END: NORMAL
MDC_IDC_STAT_EPISODE_RECENT_COUNT_DTM_START: NORMAL
MDC_IDC_STAT_EPISODE_TYPE: NORMAL
MDC_IDC_STAT_EPISODE_VENDOR_TYPE: NORMAL
MDC_IDC_STAT_TACHYTHERAPY_ATP_DELIVERED_RECENT: 0
MDC_IDC_STAT_TACHYTHERAPY_ATP_DELIVERED_TOTAL: 0
MDC_IDC_STAT_TACHYTHERAPY_RECENT_DTM_END: NORMAL
MDC_IDC_STAT_TACHYTHERAPY_RECENT_DTM_START: NORMAL
MDC_IDC_STAT_TACHYTHERAPY_SHOCKS_ABORTED_RECENT: 0
MDC_IDC_STAT_TACHYTHERAPY_SHOCKS_ABORTED_TOTAL: 0
MDC_IDC_STAT_TACHYTHERAPY_SHOCKS_DELIVERED_RECENT: 0
MDC_IDC_STAT_TACHYTHERAPY_SHOCKS_DELIVERED_TOTAL: 1
MDC_IDC_STAT_TACHYTHERAPY_TOTAL_DTM_END: NORMAL
MDC_IDC_STAT_TACHYTHERAPY_TOTAL_DTM_START: NORMAL

## 2021-04-26 DIAGNOSIS — I50.22 CHRONIC SYSTOLIC CONGESTIVE HEART FAILURE (H): ICD-10-CM

## 2021-04-27 RX ORDER — CARVEDILOL 12.5 MG/1
18.75 TABLET ORAL 2 TIMES DAILY WITH MEALS
Qty: 270 TABLET | Refills: 1 | Status: SHIPPED | OUTPATIENT
Start: 2021-04-27 | End: 2021-11-02

## 2021-04-27 NOTE — TELEPHONE ENCOUNTER
Last Clinic Visit: 10/8/2020  [facility]

## 2021-05-04 ENCOUNTER — ANCILLARY PROCEDURE (OUTPATIENT)
Dept: CARDIOLOGY | Facility: CLINIC | Age: 66
End: 2021-05-04
Attending: INTERNAL MEDICINE
Payer: MEDICARE

## 2021-05-04 DIAGNOSIS — I50.22 CHRONIC SYSTOLIC CONGESTIVE HEART FAILURE (H): Primary | ICD-10-CM

## 2021-05-04 DIAGNOSIS — I25.5 ISCHEMIC CARDIOMYOPATHY: ICD-10-CM

## 2021-05-04 PROCEDURE — 99207 CARDIAC DEVICE CHECK - REMOTE: CPT | Performed by: INTERNAL MEDICINE

## 2021-05-04 PROCEDURE — 93296 REM INTERROG EVL PM/IDS: CPT

## 2021-05-04 NOTE — PROGRESS NOTES
Date: 5/4/2021    Time of Call: 2:57 PM     Diagnosis:  Heart failure, palpitations     [ VORB ] Ordering provider: Jeanie Hernandez NP    Order: BMP. Device check.     Order received by: Kristen Obrien RN     Follow-up/additional notes: orders placed. Called Nitish to discuss. He will get labs done with INR on Thursday at [facility]. Message sent to device clinic to get device downloaded. He reports he will send it in within the next hour. Otherwise feels well, no questions at this time.

## 2021-05-06 ENCOUNTER — PATIENT OUTREACH (OUTPATIENT)
Dept: CARDIOLOGY | Facility: CLINIC | Age: 66
End: 2021-05-06

## 2021-05-06 DIAGNOSIS — I50.22 CHRONIC SYSTOLIC CONGESTIVE HEART FAILURE (H): Primary | ICD-10-CM

## 2021-05-10 LAB
MDC_IDC_EPISODE_DTM: NORMAL
MDC_IDC_EPISODE_DTM: NORMAL
MDC_IDC_EPISODE_DURATION: 8 S
MDC_IDC_EPISODE_ID: NORMAL
MDC_IDC_EPISODE_ID: NORMAL
MDC_IDC_EPISODE_TYPE: NORMAL
MDC_IDC_EPISODE_TYPE: NORMAL
MDC_IDC_LEAD_IMPLANT_DT: NORMAL
MDC_IDC_LEAD_LOCATION: NORMAL
MDC_IDC_LEAD_LOCATION_DETAIL_1: NORMAL
MDC_IDC_LEAD_MFG: NORMAL
MDC_IDC_LEAD_MODEL: NORMAL
MDC_IDC_LEAD_POLARITY_TYPE: NORMAL
MDC_IDC_LEAD_SERIAL: NORMAL
MDC_IDC_MSMT_BATTERY_DTM: NORMAL
MDC_IDC_MSMT_BATTERY_REMAINING_LONGEVITY: 36 MO
MDC_IDC_MSMT_BATTERY_REMAINING_PERCENTAGE: 38 %
MDC_IDC_MSMT_BATTERY_STATUS: NORMAL
MDC_IDC_MSMT_CAP_CHARGE_DTM: NORMAL
MDC_IDC_MSMT_CAP_CHARGE_DTM: NORMAL
MDC_IDC_MSMT_CAP_CHARGE_ENERGY: 1 J
MDC_IDC_MSMT_CAP_CHARGE_TIME: 0.3 S
MDC_IDC_MSMT_CAP_CHARGE_TIME: 10.5 S
MDC_IDC_MSMT_CAP_CHARGE_TYPE: NORMAL
MDC_IDC_MSMT_CAP_CHARGE_TYPE: NORMAL
MDC_IDC_MSMT_LEADCHNL_RV_IMPEDANCE_VALUE: 664 OHM
MDC_IDC_PG_IMPLANT_DTM: NORMAL
MDC_IDC_PG_MFG: NORMAL
MDC_IDC_PG_MODEL: NORMAL
MDC_IDC_PG_SERIAL: NORMAL
MDC_IDC_PG_TYPE: NORMAL
MDC_IDC_SESS_CLINIC_NAME: NORMAL
MDC_IDC_SESS_DTM: NORMAL
MDC_IDC_SESS_TYPE: NORMAL
MDC_IDC_SET_BRADY_LOWRATE: 40 {BEATS}/MIN
MDC_IDC_SET_BRADY_MODE: NORMAL
MDC_IDC_SET_LEADCHNL_RV_PACING_AMPLITUDE: 2.4 V
MDC_IDC_SET_LEADCHNL_RV_PACING_POLARITY: NORMAL
MDC_IDC_SET_LEADCHNL_RV_PACING_PULSEWIDTH: 0.5 MS
MDC_IDC_SET_LEADCHNL_RV_SENSING_ADAPTATION_MODE: NORMAL
MDC_IDC_SET_LEADCHNL_RV_SENSING_POLARITY: NORMAL
MDC_IDC_SET_LEADCHNL_RV_SENSING_SENSITIVITY: 0.6 MV
MDC_IDC_SET_ZONE_DETECTION_INTERVAL: 273 MS
MDC_IDC_SET_ZONE_DETECTION_INTERVAL: 333 MS
MDC_IDC_SET_ZONE_TYPE: NORMAL
MDC_IDC_SET_ZONE_TYPE: NORMAL
MDC_IDC_SET_ZONE_VENDOR_TYPE: NORMAL
MDC_IDC_SET_ZONE_VENDOR_TYPE: NORMAL
MDC_IDC_STAT_BRADY_DTM_END: NORMAL
MDC_IDC_STAT_BRADY_DTM_START: NORMAL
MDC_IDC_STAT_BRADY_RV_PERCENT_PACED: 0 %
MDC_IDC_STAT_EPISODE_RECENT_COUNT: 0
MDC_IDC_STAT_EPISODE_RECENT_COUNT: 1
MDC_IDC_STAT_EPISODE_RECENT_COUNT_DTM_END: NORMAL
MDC_IDC_STAT_EPISODE_RECENT_COUNT_DTM_START: NORMAL
MDC_IDC_STAT_EPISODE_TYPE: NORMAL
MDC_IDC_STAT_EPISODE_VENDOR_TYPE: NORMAL
MDC_IDC_STAT_TACHYTHERAPY_ATP_DELIVERED_RECENT: 0
MDC_IDC_STAT_TACHYTHERAPY_ATP_DELIVERED_TOTAL: 0
MDC_IDC_STAT_TACHYTHERAPY_RECENT_DTM_END: NORMAL
MDC_IDC_STAT_TACHYTHERAPY_RECENT_DTM_START: NORMAL
MDC_IDC_STAT_TACHYTHERAPY_SHOCKS_ABORTED_RECENT: 0
MDC_IDC_STAT_TACHYTHERAPY_SHOCKS_ABORTED_TOTAL: 0
MDC_IDC_STAT_TACHYTHERAPY_SHOCKS_DELIVERED_RECENT: 0
MDC_IDC_STAT_TACHYTHERAPY_SHOCKS_DELIVERED_TOTAL: 1
MDC_IDC_STAT_TACHYTHERAPY_TOTAL_DTM_END: NORMAL
MDC_IDC_STAT_TACHYTHERAPY_TOTAL_DTM_START: NORMAL

## 2021-05-26 ENCOUNTER — PATIENT OUTREACH (OUTPATIENT)
Dept: CARDIOLOGY | Facility: CLINIC | Age: 66
End: 2021-05-26

## 2021-05-26 NOTE — TELEPHONE ENCOUNTER
Called fili to discuss labs. He hasn't been able to get in yet but reports he should be in soon to get an INR checked and will get them drawn at that time. Reports feeling well.

## 2021-06-01 NOTE — TELEPHONE ENCOUNTER
warfarin ANTICOAGULANT (COUMADIN) 2 MG tablet  Last Written Prescription Date:  ?  Last Fill Quantity: ?,   # refills: ?  Last Office Visit : 10/8/2020  Future Office visit:  None    Routing refill request to provider for review/approval because:  Drug not active on patient's medication list      Lolis Wilson RN  Central Triage Red Flags/Med Refills

## 2021-06-03 RX ORDER — WARFARIN SODIUM 2 MG/1
TABLET ORAL
OUTPATIENT
Start: 2021-06-03

## 2021-06-03 NOTE — TELEPHONE ENCOUNTER
Called [facility] and notified them that Mr Hester follows with INR clinic in [place] under Dr Leidy Herrera.  They plan to manually fax to her office.

## 2021-08-19 ENCOUNTER — ANCILLARY PROCEDURE (OUTPATIENT)
Dept: CARDIOLOGY | Facility: CLINIC | Age: 66
End: 2021-08-19
Attending: INTERNAL MEDICINE
Payer: MEDICARE

## 2021-08-19 DIAGNOSIS — I25.5 ISCHEMIC CARDIOMYOPATHY: ICD-10-CM

## 2021-08-19 PROCEDURE — 93296 REM INTERROG EVL PM/IDS: CPT

## 2021-08-23 ENCOUNTER — PATIENT OUTREACH (OUTPATIENT)
Dept: CARDIOLOGY | Facility: CLINIC | Age: 66
End: 2021-08-23

## 2021-08-23 NOTE — TELEPHONE ENCOUNTER
Received message that Matt was wondering about follow up with Dr Maynard.  Per last OV needs follow up with Jeanie Hernandez in [place]- arranged for [facility] with echo (per Dr Maynard OV), labs and device check

## 2021-09-09 LAB
MDC_IDC_EPISODE_DTM: NORMAL
MDC_IDC_EPISODE_ID: NORMAL
MDC_IDC_EPISODE_TYPE: NORMAL
MDC_IDC_LEAD_IMPLANT_DT: NORMAL
MDC_IDC_LEAD_LOCATION: NORMAL
MDC_IDC_LEAD_LOCATION_DETAIL_1: NORMAL
MDC_IDC_LEAD_MFG: NORMAL
MDC_IDC_LEAD_MODEL: NORMAL
MDC_IDC_LEAD_POLARITY_TYPE: NORMAL
MDC_IDC_LEAD_SERIAL: NORMAL
MDC_IDC_MSMT_BATTERY_DTM: NORMAL
MDC_IDC_MSMT_BATTERY_REMAINING_LONGEVITY: 30 MO
MDC_IDC_MSMT_BATTERY_REMAINING_PERCENTAGE: 35 %
MDC_IDC_MSMT_BATTERY_STATUS: NORMAL
MDC_IDC_MSMT_CAP_CHARGE_DTM: NORMAL
MDC_IDC_MSMT_CAP_CHARGE_DTM: NORMAL
MDC_IDC_MSMT_CAP_CHARGE_ENERGY: 1 J
MDC_IDC_MSMT_CAP_CHARGE_TIME: 0.3 S
MDC_IDC_MSMT_CAP_CHARGE_TIME: 10.6 S
MDC_IDC_MSMT_CAP_CHARGE_TYPE: NORMAL
MDC_IDC_MSMT_CAP_CHARGE_TYPE: NORMAL
MDC_IDC_MSMT_LEADCHNL_RV_IMPEDANCE_VALUE: 603 OHM
MDC_IDC_PG_IMPLANT_DTM: NORMAL
MDC_IDC_PG_MFG: NORMAL
MDC_IDC_PG_MODEL: NORMAL
MDC_IDC_PG_SERIAL: NORMAL
MDC_IDC_PG_TYPE: NORMAL
MDC_IDC_SESS_CLINIC_NAME: NORMAL
MDC_IDC_SESS_DTM: NORMAL
MDC_IDC_SESS_TYPE: NORMAL
MDC_IDC_SET_BRADY_LOWRATE: 40 {BEATS}/MIN
MDC_IDC_SET_BRADY_MODE: NORMAL
MDC_IDC_SET_LEADCHNL_RV_PACING_AMPLITUDE: 2.4 V
MDC_IDC_SET_LEADCHNL_RV_PACING_POLARITY: NORMAL
MDC_IDC_SET_LEADCHNL_RV_PACING_PULSEWIDTH: 0.5 MS
MDC_IDC_SET_LEADCHNL_RV_SENSING_ADAPTATION_MODE: NORMAL
MDC_IDC_SET_LEADCHNL_RV_SENSING_POLARITY: NORMAL
MDC_IDC_SET_LEADCHNL_RV_SENSING_SENSITIVITY: 0.6 MV
MDC_IDC_SET_ZONE_DETECTION_INTERVAL: 273 MS
MDC_IDC_SET_ZONE_DETECTION_INTERVAL: 333 MS
MDC_IDC_SET_ZONE_TYPE: NORMAL
MDC_IDC_SET_ZONE_TYPE: NORMAL
MDC_IDC_SET_ZONE_VENDOR_TYPE: NORMAL
MDC_IDC_SET_ZONE_VENDOR_TYPE: NORMAL
MDC_IDC_STAT_BRADY_DTM_END: NORMAL
MDC_IDC_STAT_BRADY_DTM_START: NORMAL
MDC_IDC_STAT_BRADY_RV_PERCENT_PACED: 0 %
MDC_IDC_STAT_EPISODE_RECENT_COUNT: 0
MDC_IDC_STAT_EPISODE_RECENT_COUNT_DTM_END: NORMAL
MDC_IDC_STAT_EPISODE_RECENT_COUNT_DTM_START: NORMAL
MDC_IDC_STAT_EPISODE_TYPE: NORMAL
MDC_IDC_STAT_EPISODE_VENDOR_TYPE: NORMAL
MDC_IDC_STAT_TACHYTHERAPY_ATP_DELIVERED_RECENT: 0
MDC_IDC_STAT_TACHYTHERAPY_ATP_DELIVERED_TOTAL: 0
MDC_IDC_STAT_TACHYTHERAPY_RECENT_DTM_END: NORMAL
MDC_IDC_STAT_TACHYTHERAPY_RECENT_DTM_START: NORMAL
MDC_IDC_STAT_TACHYTHERAPY_SHOCKS_ABORTED_RECENT: 0
MDC_IDC_STAT_TACHYTHERAPY_SHOCKS_ABORTED_TOTAL: 0
MDC_IDC_STAT_TACHYTHERAPY_SHOCKS_DELIVERED_RECENT: 0
MDC_IDC_STAT_TACHYTHERAPY_SHOCKS_DELIVERED_TOTAL: 1
MDC_IDC_STAT_TACHYTHERAPY_TOTAL_DTM_END: NORMAL
MDC_IDC_STAT_TACHYTHERAPY_TOTAL_DTM_START: NORMAL

## 2021-10-03 ENCOUNTER — HEALTH MAINTENANCE LETTER (OUTPATIENT)
Age: 66
End: 2021-10-03

## 2021-10-29 DIAGNOSIS — I50.22 CHRONIC SYSTOLIC CONGESTIVE HEART FAILURE (H): ICD-10-CM

## 2021-11-02 RX ORDER — CARVEDILOL 12.5 MG/1
18.75 TABLET ORAL 2 TIMES DAILY WITH MEALS
Qty: 270 TABLET | Refills: 0 | Status: SHIPPED | OUTPATIENT
Start: 2021-11-02 | End: 2022-02-02

## 2021-11-02 NOTE — TELEPHONE ENCOUNTER
Last Clinic Visit: 10/8/20 recommended  1 year follow up with Jeanie.  Harishue for documented in clinic blood pressure. 90 day refill provided, routed to clinic scheduling for follow up

## 2021-11-17 ENCOUNTER — ANCILLARY PROCEDURE (OUTPATIENT)
Dept: CARDIOLOGY | Facility: CLINIC | Age: 66
End: 2021-11-17
Attending: INTERNAL MEDICINE
Payer: COMMERCIAL

## 2021-11-17 ENCOUNTER — OFFICE VISIT (OUTPATIENT)
Dept: CARDIOLOGY | Facility: CLINIC | Age: 66
End: 2021-11-17
Attending: NURSE PRACTITIONER
Payer: COMMERCIAL

## 2021-11-17 ENCOUNTER — LAB (OUTPATIENT)
Dept: LAB | Facility: CLINIC | Age: 66
End: 2021-11-17
Payer: COMMERCIAL

## 2021-11-17 VITALS
SYSTOLIC BLOOD PRESSURE: 142 MMHG | DIASTOLIC BLOOD PRESSURE: 89 MMHG | BODY MASS INDEX: 23.59 KG/M2 | WEIGHT: 150.6 LBS | OXYGEN SATURATION: 97 % | HEART RATE: 78 BPM

## 2021-11-17 DIAGNOSIS — I50.22 CHRONIC SYSTOLIC CONGESTIVE HEART FAILURE (H): Primary | ICD-10-CM

## 2021-11-17 DIAGNOSIS — I50.22 CHRONIC SYSTOLIC CONGESTIVE HEART FAILURE (H): ICD-10-CM

## 2021-11-17 DIAGNOSIS — I25.5 ISCHEMIC CARDIOMYOPATHY: ICD-10-CM

## 2021-11-17 LAB
ANION GAP SERPL CALCULATED.3IONS-SCNC: 7 MMOL/L (ref 3–14)
BI-PLANE LVEF ECHO: NORMAL
BUN SERPL-MCNC: 8 MG/DL (ref 7–30)
CALCIUM SERPL-MCNC: 8.8 MG/DL (ref 8.5–10.1)
CHLORIDE BLD-SCNC: 102 MMOL/L (ref 94–109)
CO2 SERPL-SCNC: 31 MMOL/L (ref 20–32)
CREAT SERPL-MCNC: 0.8 MG/DL (ref 0.66–1.25)
GFR SERPL CREATININE-BSD FRML MDRD: >90 ML/MIN/1.73M2
GLUCOSE BLD-MCNC: 85 MG/DL (ref 70–99)
POTASSIUM BLD-SCNC: 3.6 MMOL/L (ref 3.4–5.3)
SODIUM SERPL-SCNC: 140 MMOL/L (ref 133–144)

## 2021-11-17 PROCEDURE — 80048 BASIC METABOLIC PNL TOTAL CA: CPT | Performed by: PATHOLOGY

## 2021-11-17 PROCEDURE — 99214 OFFICE O/P EST MOD 30 MIN: CPT | Mod: 25 | Performed by: NURSE PRACTITIONER

## 2021-11-17 PROCEDURE — 93282 PRGRMG EVAL IMPLANTABLE DFB: CPT | Performed by: INTERNAL MEDICINE

## 2021-11-17 PROCEDURE — 93306 TTE W/DOPPLER COMPLETE: CPT | Performed by: INTERNAL MEDICINE

## 2021-11-17 PROCEDURE — 36415 COLL VENOUS BLD VENIPUNCTURE: CPT | Performed by: PATHOLOGY

## 2021-11-17 PROCEDURE — G0463 HOSPITAL OUTPT CLINIC VISIT: HCPCS

## 2021-11-17 RX ORDER — SACUBITRIL AND VALSARTAN 24; 26 MG/1; MG/1
1 TABLET, FILM COATED ORAL 2 TIMES DAILY
COMMUNITY
Start: 2021-11-17 | End: 2021-11-19

## 2021-11-17 RX ADMIN — Medication 5 ML: at 16:20

## 2021-11-17 ASSESSMENT — PAIN SCALES - GENERAL: PAINLEVEL: NO PAIN (0)

## 2021-11-17 NOTE — NURSING NOTE
Chief Complaint   Patient presents with     Follow Up     November 17, 2021- reason for visit: RTN [facility]: 66 year old male presents with Chronic systolic congestive heart failure for follow up     Vitals were taken and medications reconciled.    Toy Pruitt, PEGGY  3:12 PM

## 2021-11-17 NOTE — LETTER
11/17/2021      RE: Matt Hester  [address]  [facility] [address]       Dear Colleague,    Thank you for the opportunity to participate in the care of your patient, Matt Hester, at the [facility] [place] at [facility]. Please see a copy of my visit note below.    HPI: 66 yr old male patient presents for follow up of ICM. He has been feeling well over the summer and has minimal sx of heart failure. 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 In addition he is kept busy with his 2 grandchildren, which he enjoys. He does have some fatigue after long days, but otherwise denies SOB, orthopnea, PND, chest pain, LE edema. His wt has been inching up, but he attributes this to eating too much junk food. He has only used lasix 1x in the past several months and he did not feel it made a difference.  He admits to not taking his Potassium supplement as he ran out, but did get it filed and restarted it this week.     We discussed switching him from Enalapril to Entresto and he is now willing to do this. It had been suggested in the past and he was reluctant to make any changes, but now the compelling data has changed his mind.    PAST MEDICAL HISTORY:  Past Medical History:   Diagnosis Date     Atrial fibrillation (H)      Chronic systolic congestive heart failure (H)      CVA (cerebral vascular accident) (H)      HTN (hypertension)      Hyperlipidemia      ICD (implantable cardioverter-defibrillator) in place      Ischemic cardiomyopathy      Peripheral vascular disease (H)        FAMILY HISTORY:  No family history on file.    SOCIAL HISTORY:  Social History     Socioeconomic History     Marital status: Single     Spouse name: None     Number of children: None     Years of education: None     Highest education level: None   Occupational History     None    Tobacco Use     Smoking status: Never Smoker     Smokeless tobacco: Never Used   Substance and Sexual Activity     Alcohol use: No     Drug use: No     Sexual activity: None   Other Topics Concern     Parent/sibling w/ CABG, MI or angioplasty before 65F 55M? Not Asked   Social History Narrative     None     Social Determinants of Health     Financial Resource Strain: Not on file   Food Insecurity: Not on file   Transportation Needs: Not on file   Physical Activity: Not on file   Stress: Not on file   Social Connections: Not on file   Intimate Partner Violence: Not on file   Housing Stability: Not on file       CURRENT MEDICATIONS:  Current Outpatient Medications   Medication Sig Dispense Refill     carvedilol (COREG) 12.5 MG tablet Take 1.5 tablets (18.75 mg) by mouth 2 times daily (with meals) For additional refills, please schedule a follow-up appointment at [phone] 270 tablet 0     enalapril (VASOTEC) 5 MG tablet Take 1 tablet (5 mg) by mouth 2 times daily 30 tablet 1     potassium chloride ER (KLOR-CON M) 10 MEQ CR tablet Take 1 tablet by mouth three times per week.       Rosuvastatin Calcium (CRESTOR PO) Take 40 mg by mouth daily       SPIRONOLACTONE PO Take 12.5 mg by mouth daily       Furosemide (LASIX PO) Take 20 mg by mouth (Patient not taking: Reported on 11/17/2021)       lidocaine VISCOUS (XYLOCAINE) 2 % solution  (Patient not taking: Reported on 11/17/2021)       Magic Mouthwash (FV std formula) lidocaine visc 2% 2.5mL/5mL & maalox/mylanta w/ simeth 2.5mL/5mL & diphenhydrAMINE 5mg/5mL Swish and swallow 10 mLs in mouth every 6 hours as needed for mouth sores (Patient not taking: Reported on 11/17/2021)         ROS:   Constitutional: No fever, chills, or sweats. No weight gain/loss.   ENT: No visual disturbance, ear ache, epistaxis, sore throat.   Allergies/Immunologic: Negative.   Respiratory: No cough, hemoptysis.   Cardiovascular: As per HPI.   GI: No nausea, vomiting, hematemesis, melena, or  hematochezia.   : No urinary frequency, dysuria, or hematuria.   Integument: Negative.   Psychiatric: Negative.   Neuro: Negative.   Endocrinology: Negative.   Musculoskeletal: Negative.    EXAM:  BP (!) 142/89 (BP Location: Right arm, Patient Position: Chair, Cuff Size: Adult Regular)   Pulse 78   Wt 68.3 kg (150 lb 9.6 oz)   SpO2 97%   BMI 23.59 kg/m    General: appears comfortable, alert and articulate  Head: normocephalic, atraumatic  Eyes: anicteric sclera, EOMI  Neck: no adenopathy  Orophyarynx: moist mucosa, no lesions, dentition intact  Heart: regular, S1/S2, no murmur, gallop, rub, estimated JVP 8cm  Lungs: clear, no rales or wheezing  Abdomen: soft, non-tender, bowel sounds present, no hepatosplenomegaly  Extremities: no clubbing, cyanosis or edema  Neurological: normal speech and affect, no gross motor deficits    Labs:  CBC RESULTS:  Lab Results   Component Value Date    WBC 7.1 07/21/2016    RBC 5.37 07/21/2016    HGB 15.0 07/21/2016    HCT 46.5 07/21/2016    MCV 87 07/21/2016    MCH 27.9 07/21/2016    MCHC 32.3 07/21/2016    RDW 14.0 07/21/2016     07/21/2016       CMP RESULTS:  Lab Results   Component Value Date     11/17/2021     10/10/2019    POTASSIUM 3.6 11/17/2021    POTASSIUM 3.4 10/10/2019    CHLORIDE 102 11/17/2021    CHLORIDE 101 10/10/2019    CO2 31 11/17/2021    CO2 28 10/10/2019    ANIONGAP 7 11/17/2021    ANIONGAP 6 10/10/2019    GLC 85 11/17/2021     (H) 10/10/2019    BUN 8 11/17/2021    BUN 6 (L) 10/10/2019    CR 0.80 11/17/2021    CR 0.88 10/10/2019    GFRESTIMATED >90 11/17/2021    GFRESTIMATED >90 10/10/2019    GFRESTBLACK >90 10/10/2019    JD 8.8 11/17/2021    JD 8.5 10/10/2019    BILITOTAL 0.6 12/06/2018    ALBUMIN 3.2 (L) 12/06/2018    ALKPHOS 77 12/06/2018    ALT 14 12/06/2018    AST 9 12/06/2018        INR RESULTS:  Lab Results   Component Value Date    INR 1.00 07/21/2016       Lab Results   Component Value Date    MAG 2.3 07/21/2016     No  results found for: NTBNPI  Lab Results   Component Value Date    NTBNP 204 (H) 10/10/2019       ECHO 11.17.21  Interpretation Summary  Biplane LVEF is 30%.  Mild left ventricular dilation is present.  Apical wall akinesis with small mural thrombus.  Right ventricular function, chamber size, wall motion, and thickness are  normal.  Pulmonary artery systolic pressure cannot be assessed.  The inferior vena cava is normal.  No pericardial effusion is present.    Assessment and Plan:   1. Chronic systolic  heart failure secondary to ICM with stable EF.of 30%..    Stage C  NYHA Class II  ACEi/ARB yes - will stop enalapril x 36 hours  and switch to Entresto 24-26 mg.   BB yes  Aldosterone antagonist yes  SCD prophylaxis ICD  % BiV pacing: N/A  Fluid status euvolemic  NSAID use: no  Sleep Apnea Evaluation: N/A    2. CAD: no angina sx, no change in EF; Pt on BB/Statin/ASA -     3. Left Mural Thrombus: on Warfarin - Has had 2 CVA's while on Plavix/ASA - will remain on Warfarin life long  Follow-up with phone call to assess how he is doing with medication change.  Follow up with Dr. Maynard in 6 months.    CC  Patient Care Team:  Maykel Barber MD as PCP - General (Internal Medicine)  Caesar, Kimberly Ng MD as MD (Cardiology)  Jeanie Hernandez APRN CNP as Nurse Practitioner (Nurse Practitioner - Gerontology)  Kristen Obrien, RN as Specialty Care Coordinator (Cardiology)  Isabell Steel, RN as Registered Nurse (Cardiology)      Sincerely,     NEEMA Turk CNP

## 2021-11-17 NOTE — PATIENT INSTRUCTIONS
Take your medicines every day, as directed    Changes made today:  o Check your entresto at home. If they are 24-26mg tablets and not , they are okay to take. If they are a different dose, or , please call me and I will send a new prescription for you.  o Once you have a good entresto, please STOP your enalapril. You need to wait 36 hours before starting the entresto. Entresto will be taken 1 tablet twice daily.   Monitor Your Weight and Symptoms    Contact us if you:      Gain 2 pounds in one day or 5 pounds in one week    Feel more short of breath    Notice more leg swelling    Feel lightheadeded   Change your lifestyle    Limit Salt or Sodium:    2000 mg  Limit Fluids:    2000 mL or approximately 64 ounces  Eat a Heart Healthy Diet    Low in saturated fats  Stay Active:    Aim to move at least 150 minutes every  week         To Contact us    During Business Hours:  [phone], option # 1 (University)  Then option # 4 (medical questions)     After hours, weekends or holidays:   [phone], Option #4  Ask to speak to the On-Call Cardiologist. Inform them you are a CORE/heart failure patient at the [facility].     Use CAPPTURE allows you to communicate directly with your heart team through secure messaging.    [employer] can be accessed any time on your phone, computer, or tablet.    If you need assistance, we'd be happy to help!         Keep your Heart Appointments:    Labs and echo today

## 2021-11-17 NOTE — NURSING NOTE
Diet: Patient instructed regarding a heart failure healthy diet, including discussion of reduced fat and 2000 mg daily sodium restriction, daily weights, medication purpose and compliance, fluid restrictions and resources for patient and family to access for assistance with heart failure management.       Labs: Patient was given results of the laboratory testing obtained today and patient was instructed about when to return for the next laboratory testing.     Med Reconcile: Reviewed and verified all current medications with the patient. The updated medication list was printed and given to the patient. STOp enalapril.  START entresto 24-26mg BID.     Return Appointment: Patient given instructions regarding scheduling next clinic visit.     Patient stated he understood all health information given and agreed to call with further questions or concerns.     Kristen Obrien RN

## 2021-11-17 NOTE — PROGRESS NOTES
HPI: 66 yr old male patient presents for follow up of ICM. He has been feeling well over the summer and has minimal sx of heart failure. 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 In addition he is kept busy with his 2 grandchildren, which he enjoys. He does have some fatigue after long days, but otherwise denies SOB, orthopnea, PND, chest pain, LE edema. His wt has been inching up, but he attributes this to eating too much junk food. He has only used lasix 1x in the past several months and he did not feel it made a difference.  He admits to not taking his Potassium supplement as he ran out, but did get it filed and restarted it this week.     We discussed switching him from Enalapril to Entresto and he is now willing to do this. It had been suggested in the past and he was reluctant to make any changes, but now the compelling data has changed his mind.    PAST MEDICAL HISTORY:  Past Medical History:   Diagnosis Date     Atrial fibrillation (H)      Chronic systolic congestive heart failure (H)      CVA (cerebral vascular accident) (H)      HTN (hypertension)      Hyperlipidemia      ICD (implantable cardioverter-defibrillator) in place      Ischemic cardiomyopathy      Peripheral vascular disease (H)        FAMILY HISTORY:  No family history on file.    SOCIAL HISTORY:  Social History     Socioeconomic History     Marital status: Single     Spouse name: None     Number of children: None     Years of education: None     Highest education level: None   Occupational History     None   Tobacco Use     Smoking status: Never Smoker     Smokeless tobacco: Never Used   Substance and Sexual Activity     Alcohol use: No     Drug use: No     Sexual activity: None   Other Topics Concern     Parent/sibling w/ CABG, MI or angioplasty before 65F 55M? Not Asked   Social History Narrative     None     Social Determinants of Health      Financial Resource Strain: Not on file   Food Insecurity: Not on file   Transportation Needs: Not on file   Physical Activity: Not on file   Stress: Not on file   Social Connections: Not on file   Intimate Partner Violence: Not on file   Housing Stability: Not on file       CURRENT MEDICATIONS:  Current Outpatient Medications   Medication Sig Dispense Refill     carvedilol (COREG) 12.5 MG tablet Take 1.5 tablets (18.75 mg) by mouth 2 times daily (with meals) For additional refills, please schedule a follow-up appointment at [phone] 270 tablet 0     enalapril (VASOTEC) 5 MG tablet Take 1 tablet (5 mg) by mouth 2 times daily 30 tablet 1     potassium chloride ER (KLOR-CON M) 10 MEQ CR tablet Take 1 tablet by mouth three times per week.       Rosuvastatin Calcium (CRESTOR PO) Take 40 mg by mouth daily       SPIRONOLACTONE PO Take 12.5 mg by mouth daily       Furosemide (LASIX PO) Take 20 mg by mouth (Patient not taking: Reported on 11/17/2021)       lidocaine VISCOUS (XYLOCAINE) 2 % solution  (Patient not taking: Reported on 11/17/2021)       Magic Mouthwash (FV std formula) lidocaine visc 2% 2.5mL/5mL & maalox/mylanta w/ simeth 2.5mL/5mL & diphenhydrAMINE 5mg/5mL Swish and swallow 10 mLs in mouth every 6 hours as needed for mouth sores (Patient not taking: Reported on 11/17/2021)         ROS:   Constitutional: No fever, chills, or sweats. No weight gain/loss.   ENT: No visual disturbance, ear ache, epistaxis, sore throat.   Allergies/Immunologic: Negative.   Respiratory: No cough, hemoptysis.   Cardiovascular: As per HPI.   GI: No nausea, vomiting, hematemesis, melena, or hematochezia.   : No urinary frequency, dysuria, or hematuria.   Integument: Negative.   Psychiatric: Negative.   Neuro: Negative.   Endocrinology: Negative.   Musculoskeletal: Negative.    EXAM:  BP (!) 142/89 (BP Location: Right arm, Patient Position: Chair, Cuff Size: Adult Regular)   Pulse 78   Wt 68.3 kg (150 lb 9.6 oz)   SpO2 97%    BMI 23.59 kg/m    General: appears comfortable, alert and articulate  Head: normocephalic, atraumatic  Eyes: anicteric sclera, EOMI  Neck: no adenopathy  Orophyarynx: moist mucosa, no lesions, dentition intact  Heart: regular, S1/S2, no murmur, gallop, rub, estimated JVP 8cm  Lungs: clear, no rales or wheezing  Abdomen: soft, non-tender, bowel sounds present, no hepatosplenomegaly  Extremities: no clubbing, cyanosis or edema  Neurological: normal speech and affect, no gross motor deficits    Labs:  CBC RESULTS:  Lab Results   Component Value Date    WBC 7.1 07/21/2016    RBC 5.37 07/21/2016    HGB 15.0 07/21/2016    HCT 46.5 07/21/2016    MCV 87 07/21/2016    MCH 27.9 07/21/2016    MCHC 32.3 07/21/2016    RDW 14.0 07/21/2016     07/21/2016       CMP RESULTS:  Lab Results   Component Value Date     11/17/2021     10/10/2019    POTASSIUM 3.6 11/17/2021    POTASSIUM 3.4 10/10/2019    CHLORIDE 102 11/17/2021    CHLORIDE 101 10/10/2019    CO2 31 11/17/2021    CO2 28 10/10/2019    ANIONGAP 7 11/17/2021    ANIONGAP 6 10/10/2019    GLC 85 11/17/2021     (H) 10/10/2019    BUN 8 11/17/2021    BUN 6 (L) 10/10/2019    CR 0.80 11/17/2021    CR 0.88 10/10/2019    GFRESTIMATED >90 11/17/2021    GFRESTIMATED >90 10/10/2019    GFRESTBLACK >90 10/10/2019    JD 8.8 11/17/2021    JD 8.5 10/10/2019    BILITOTAL 0.6 12/06/2018    ALBUMIN 3.2 (L) 12/06/2018    ALKPHOS 77 12/06/2018    ALT 14 12/06/2018    AST 9 12/06/2018        INR RESULTS:  Lab Results   Component Value Date    INR 1.00 07/21/2016       Lab Results   Component Value Date    MAG 2.3 07/21/2016     No results found for: NTBNPI  Lab Results   Component Value Date    NTBNP 204 (H) 10/10/2019       ECHO 11.17.21  Interpretation Summary  Biplane LVEF is 30%.  Mild left ventricular dilation is present.  Apical wall akinesis with small mural thrombus.  Right ventricular function, chamber size, wall motion, and thickness are  normal.  Pulmonary  artery systolic pressure cannot be assessed.  The inferior vena cava is normal.  No pericardial effusion is present.    Assessment and Plan:   1. Chronic systolic  heart failure secondary to ICM with stable EF.of 30%..    Stage C  NYHA Class II  ACEi/ARB yes - will stop enalapril x 36 hours  and switch to Entresto 24-26 mg.   BB yes  Aldosterone antagonist yes  SCD prophylaxis ICD  % BiV pacing: N/A  Fluid status euvolemic  NSAID use: no  Sleep Apnea Evaluation: N/A    2. CAD: no angina sx, no change in EF; Pt on BB/Statin/ASA -     3. Left Mural Thrombus: on Warfarin - Has had 2 CVA's while on Plavix/ASA - will remain on Warfarin life long          Follow-up with phone call to assess how he is doing with medication change.  Follow up with Dr. Maynard in 6 months.    CC  Patient Care Team:  Maykel Barber MD as PCP - General (Internal Medicine)  Byron Maynard MD as MD (Cardiology)  Jeanie Hernandez APRN CNP as Nurse Practitioner (Nurse Practitioner - Gerontology)  Kristen Obrien, RN as Specialty Care Coordinator (Cardiology)  Isabell Steel, RN as Registered Nurse (Cardiology)  Byron Maynard MD as Assigned Heart and Vascular Provider  BYRON MAYNARD

## 2021-11-19 DIAGNOSIS — E78.5 HYPERLIPIDEMIA: Primary | ICD-10-CM

## 2021-11-19 DIAGNOSIS — I50.22 CHRONIC SYSTOLIC CONGESTIVE HEART FAILURE (H): ICD-10-CM

## 2021-11-19 LAB
MDC_IDC_LEAD_IMPLANT_DT: NORMAL
MDC_IDC_LEAD_LOCATION: NORMAL
MDC_IDC_LEAD_LOCATION_DETAIL_1: NORMAL
MDC_IDC_LEAD_MFG: NORMAL
MDC_IDC_LEAD_MODEL: NORMAL
MDC_IDC_LEAD_POLARITY_TYPE: NORMAL
MDC_IDC_LEAD_SERIAL: NORMAL
MDC_IDC_MSMT_BATTERY_DTM: NORMAL
MDC_IDC_MSMT_BATTERY_REMAINING_LONGEVITY: 30 MO
MDC_IDC_MSMT_BATTERY_REMAINING_PERCENTAGE: 32 %
MDC_IDC_MSMT_BATTERY_STATUS: NORMAL
MDC_IDC_MSMT_CAP_CHARGE_DTM: NORMAL
MDC_IDC_MSMT_CAP_CHARGE_DTM: NORMAL
MDC_IDC_MSMT_CAP_CHARGE_ENERGY: 1 J
MDC_IDC_MSMT_CAP_CHARGE_TIME: 0.3 S
MDC_IDC_MSMT_CAP_CHARGE_TIME: 10.7 S
MDC_IDC_MSMT_CAP_CHARGE_TYPE: NORMAL
MDC_IDC_MSMT_CAP_CHARGE_TYPE: NORMAL
MDC_IDC_MSMT_LEADCHNL_RV_IMPEDANCE_VALUE: 691 OHM
MDC_IDC_MSMT_LEADCHNL_RV_PACING_THRESHOLD_AMPLITUDE: 0.9 V
MDC_IDC_MSMT_LEADCHNL_RV_PACING_THRESHOLD_PULSEWIDTH: 0.5 MS
MDC_IDC_PG_IMPLANT_DTM: NORMAL
MDC_IDC_PG_MFG: NORMAL
MDC_IDC_PG_MODEL: NORMAL
MDC_IDC_PG_SERIAL: NORMAL
MDC_IDC_PG_TYPE: NORMAL
MDC_IDC_SESS_CLINIC_NAME: NORMAL
MDC_IDC_SESS_DTM: NORMAL
MDC_IDC_SESS_TYPE: NORMAL
MDC_IDC_SET_BRADY_LOWRATE: 40 {BEATS}/MIN
MDC_IDC_SET_BRADY_MODE: NORMAL
MDC_IDC_SET_LEADCHNL_RV_PACING_AMPLITUDE: 2.4 V
MDC_IDC_SET_LEADCHNL_RV_PACING_POLARITY: NORMAL
MDC_IDC_SET_LEADCHNL_RV_PACING_PULSEWIDTH: 0.5 MS
MDC_IDC_SET_LEADCHNL_RV_SENSING_ADAPTATION_MODE: NORMAL
MDC_IDC_SET_LEADCHNL_RV_SENSING_POLARITY: NORMAL
MDC_IDC_SET_LEADCHNL_RV_SENSING_SENSITIVITY: 0.6 MV
MDC_IDC_SET_ZONE_DETECTION_INTERVAL: 273 MS
MDC_IDC_SET_ZONE_DETECTION_INTERVAL: 333 MS
MDC_IDC_SET_ZONE_TYPE: NORMAL
MDC_IDC_SET_ZONE_TYPE: NORMAL
MDC_IDC_SET_ZONE_VENDOR_TYPE: NORMAL
MDC_IDC_SET_ZONE_VENDOR_TYPE: NORMAL
MDC_IDC_STAT_BRADY_DTM_END: NORMAL
MDC_IDC_STAT_BRADY_DTM_START: NORMAL
MDC_IDC_STAT_BRADY_RV_PERCENT_PACED: 1 %
MDC_IDC_STAT_EPISODE_RECENT_COUNT: 0
MDC_IDC_STAT_EPISODE_RECENT_COUNT: 3
MDC_IDC_STAT_EPISODE_RECENT_COUNT_DTM_END: NORMAL
MDC_IDC_STAT_EPISODE_RECENT_COUNT_DTM_START: NORMAL
MDC_IDC_STAT_EPISODE_TYPE: NORMAL
MDC_IDC_STAT_EPISODE_VENDOR_TYPE: NORMAL
MDC_IDC_STAT_TACHYTHERAPY_ATP_DELIVERED_RECENT: 0
MDC_IDC_STAT_TACHYTHERAPY_ATP_DELIVERED_TOTAL: 0
MDC_IDC_STAT_TACHYTHERAPY_RECENT_DTM_END: NORMAL
MDC_IDC_STAT_TACHYTHERAPY_RECENT_DTM_START: NORMAL
MDC_IDC_STAT_TACHYTHERAPY_SHOCKS_ABORTED_RECENT: 0
MDC_IDC_STAT_TACHYTHERAPY_SHOCKS_ABORTED_TOTAL: 0
MDC_IDC_STAT_TACHYTHERAPY_SHOCKS_DELIVERED_RECENT: 0
MDC_IDC_STAT_TACHYTHERAPY_SHOCKS_DELIVERED_TOTAL: 1
MDC_IDC_STAT_TACHYTHERAPY_TOTAL_DTM_END: NORMAL
MDC_IDC_STAT_TACHYTHERAPY_TOTAL_DTM_START: NORMAL

## 2021-11-19 RX ORDER — ROSUVASTATIN CALCIUM 40 MG/1
40 TABLET, COATED ORAL DAILY
Qty: 90 TABLET | Refills: 1 | Status: SHIPPED | OUTPATIENT
Start: 2021-11-19 | End: 2023-04-11

## 2021-11-19 RX ORDER — SACUBITRIL AND VALSARTAN 24; 26 MG/1; MG/1
1 TABLET, FILM COATED ORAL 2 TIMES DAILY
Qty: 180 TABLET | Refills: 1 | Status: SHIPPED | OUTPATIENT
Start: 2021-11-19 | End: 2022-02-11

## 2021-12-03 ENCOUNTER — PATIENT OUTREACH (OUTPATIENT)
Dept: CARDIOLOGY | Facility: CLINIC | Age: 66
End: 2021-12-03
Payer: COMMERCIAL

## 2021-12-03 DIAGNOSIS — I50.22 CHRONIC SYSTOLIC CONGESTIVE HEART FAILURE (H): Primary | ICD-10-CM

## 2021-12-03 NOTE — TELEPHONE ENCOUNTER
Called Matt to check in after starting entresto. He reports feeling alright. States the first couple days his heart rate jumped up to 115bpm for about an hour after he took entresto but states this has resolved and he overall feels well. States he has been on entresto for about 8-10 days now. States he doesn't feel dizzy or lightheaded. Reports blood pressure of 125/90 usually. Discussed he should get labs done about 2 weeks after being on entresto. He is due for INR check at local clinic next week so will also get a BMP at that time. Order faxed for him.  Will check in again with him after lab results. No questions at this time.

## 2021-12-14 ENCOUNTER — PATIENT OUTREACH (OUTPATIENT)
Dept: CARDIOLOGY | Facility: CLINIC | Age: 66
End: 2021-12-14
Payer: COMMERCIAL

## 2021-12-14 NOTE — TELEPHONE ENCOUNTER
Called Matt to check in after being on entresto a few weeks and review lab results. Left message asking for call or mychart back to check in.

## 2022-01-30 DIAGNOSIS — I50.22 CHRONIC SYSTOLIC CONGESTIVE HEART FAILURE (H): ICD-10-CM

## 2022-02-02 RX ORDER — CARVEDILOL 12.5 MG/1
18.75 TABLET ORAL 2 TIMES DAILY WITH MEALS
Qty: 270 TABLET | Refills: 1 | Status: SHIPPED | OUTPATIENT
Start: 2022-02-02 | End: 2023-02-23

## 2022-02-02 NOTE — TELEPHONE ENCOUNTER
CARVEDILOL 12.5 MG TABLET  Last Written Prescription Date:   11/2/2021  Last Fill Quantity: 270,   # refills: 0  Last Office Visit :  11/17/2021  Future Office visit:  None  270 Tabs, 1 Refills sent to pharm 2/2/2022      Lolis Wilson RN  Central Triage Red Flags/Med Refills

## 2022-02-11 ENCOUNTER — TELEPHONE (OUTPATIENT)
Dept: CARDIOLOGY | Facility: CLINIC | Age: 67
End: 2022-02-11
Payer: COMMERCIAL

## 2022-02-11 NOTE — TELEPHONE ENCOUNTER
[facility] Call Center    Phone Message    May a detailed message be left on voicemail: yes     Reason for Call: Other: pharmacy is asking is patient is still on medication entresto and needs clarifcation on medication enalapril (VASOTEC) 5 MG      Action Taken: Other: clinical pool    Travel Screening: Not Applicable

## 2022-02-23 ENCOUNTER — ANCILLARY PROCEDURE (OUTPATIENT)
Dept: CARDIOLOGY | Facility: CLINIC | Age: 67
End: 2022-02-23
Attending: INTERNAL MEDICINE
Payer: MEDICARE

## 2022-02-23 DIAGNOSIS — I25.5 ISCHEMIC CARDIOMYOPATHY: ICD-10-CM

## 2022-02-23 PROCEDURE — 93296 REM INTERROG EVL PM/IDS: CPT

## 2022-02-23 PROCEDURE — 93295 DEV INTERROG REMOTE 1/2/MLT: CPT | Performed by: INTERNAL MEDICINE

## 2022-03-20 ENCOUNTER — HEALTH MAINTENANCE LETTER (OUTPATIENT)
Age: 67
End: 2022-03-20

## 2022-03-31 LAB
MDC_IDC_EPISODE_DTM: NORMAL
MDC_IDC_EPISODE_ID: NORMAL
MDC_IDC_EPISODE_TYPE: NORMAL
MDC_IDC_LEAD_IMPLANT_DT: NORMAL
MDC_IDC_LEAD_LOCATION: NORMAL
MDC_IDC_LEAD_LOCATION_DETAIL_1: NORMAL
MDC_IDC_LEAD_MFG: NORMAL
MDC_IDC_LEAD_MODEL: NORMAL
MDC_IDC_LEAD_POLARITY_TYPE: NORMAL
MDC_IDC_LEAD_SERIAL: NORMAL
MDC_IDC_MSMT_BATTERY_DTM: NORMAL
MDC_IDC_MSMT_BATTERY_REMAINING_LONGEVITY: 24 MO
MDC_IDC_MSMT_BATTERY_REMAINING_PERCENTAGE: 28 %
MDC_IDC_MSMT_BATTERY_STATUS: NORMAL
MDC_IDC_MSMT_CAP_CHARGE_DTM: NORMAL
MDC_IDC_MSMT_CAP_CHARGE_DTM: NORMAL
MDC_IDC_MSMT_CAP_CHARGE_ENERGY: 1 J
MDC_IDC_MSMT_CAP_CHARGE_TIME: 0.3 S
MDC_IDC_MSMT_CAP_CHARGE_TIME: 10.8 S
MDC_IDC_MSMT_CAP_CHARGE_TYPE: NORMAL
MDC_IDC_MSMT_CAP_CHARGE_TYPE: NORMAL
MDC_IDC_MSMT_LEADCHNL_RV_IMPEDANCE_VALUE: 690 OHM
MDC_IDC_PG_IMPLANT_DTM: NORMAL
MDC_IDC_PG_MFG: NORMAL
MDC_IDC_PG_MODEL: NORMAL
MDC_IDC_PG_SERIAL: NORMAL
MDC_IDC_PG_TYPE: NORMAL
MDC_IDC_SESS_CLINIC_NAME: NORMAL
MDC_IDC_SESS_DTM: NORMAL
MDC_IDC_SESS_TYPE: NORMAL
MDC_IDC_SET_BRADY_LOWRATE: 40 {BEATS}/MIN
MDC_IDC_SET_BRADY_MODE: NORMAL
MDC_IDC_SET_LEADCHNL_RV_PACING_AMPLITUDE: 2.4 V
MDC_IDC_SET_LEADCHNL_RV_PACING_POLARITY: NORMAL
MDC_IDC_SET_LEADCHNL_RV_PACING_PULSEWIDTH: 0.5 MS
MDC_IDC_SET_LEADCHNL_RV_SENSING_ADAPTATION_MODE: NORMAL
MDC_IDC_SET_LEADCHNL_RV_SENSING_POLARITY: NORMAL
MDC_IDC_SET_LEADCHNL_RV_SENSING_SENSITIVITY: 0.6 MV
MDC_IDC_SET_ZONE_DETECTION_INTERVAL: 273 MS
MDC_IDC_SET_ZONE_DETECTION_INTERVAL: 333 MS
MDC_IDC_SET_ZONE_TYPE: NORMAL
MDC_IDC_SET_ZONE_TYPE: NORMAL
MDC_IDC_SET_ZONE_VENDOR_TYPE: NORMAL
MDC_IDC_SET_ZONE_VENDOR_TYPE: NORMAL
MDC_IDC_STAT_BRADY_DTM_END: NORMAL
MDC_IDC_STAT_BRADY_DTM_START: NORMAL
MDC_IDC_STAT_BRADY_RV_PERCENT_PACED: 0 %
MDC_IDC_STAT_EPISODE_RECENT_COUNT: 0
MDC_IDC_STAT_EPISODE_RECENT_COUNT_DTM_END: NORMAL
MDC_IDC_STAT_EPISODE_RECENT_COUNT_DTM_START: NORMAL
MDC_IDC_STAT_EPISODE_TYPE: NORMAL
MDC_IDC_STAT_EPISODE_VENDOR_TYPE: NORMAL
MDC_IDC_STAT_TACHYTHERAPY_ATP_DELIVERED_RECENT: 0
MDC_IDC_STAT_TACHYTHERAPY_ATP_DELIVERED_TOTAL: 0
MDC_IDC_STAT_TACHYTHERAPY_RECENT_DTM_END: NORMAL
MDC_IDC_STAT_TACHYTHERAPY_RECENT_DTM_START: NORMAL
MDC_IDC_STAT_TACHYTHERAPY_SHOCKS_ABORTED_RECENT: 0
MDC_IDC_STAT_TACHYTHERAPY_SHOCKS_ABORTED_TOTAL: 0
MDC_IDC_STAT_TACHYTHERAPY_SHOCKS_DELIVERED_RECENT: 0
MDC_IDC_STAT_TACHYTHERAPY_SHOCKS_DELIVERED_TOTAL: 1
MDC_IDC_STAT_TACHYTHERAPY_TOTAL_DTM_END: NORMAL
MDC_IDC_STAT_TACHYTHERAPY_TOTAL_DTM_START: NORMAL

## 2022-05-31 ENCOUNTER — ANCILLARY PROCEDURE (OUTPATIENT)
Dept: CARDIOLOGY | Facility: CLINIC | Age: 67
End: 2022-05-31
Attending: INTERNAL MEDICINE
Payer: MEDICARE

## 2022-05-31 DIAGNOSIS — I25.5 ISCHEMIC CARDIOMYOPATHY: ICD-10-CM

## 2022-05-31 PROCEDURE — 93296 REM INTERROG EVL PM/IDS: CPT

## 2022-05-31 PROCEDURE — 93295 DEV INTERROG REMOTE 1/2/MLT: CPT | Performed by: INTERNAL MEDICINE

## 2022-06-12 LAB
MDC_IDC_EPISODE_DTM: NORMAL
MDC_IDC_EPISODE_DTM: NORMAL
MDC_IDC_EPISODE_DURATION: 7 S
MDC_IDC_EPISODE_ID: NORMAL
MDC_IDC_EPISODE_ID: NORMAL
MDC_IDC_EPISODE_TYPE: NORMAL
MDC_IDC_EPISODE_TYPE: NORMAL
MDC_IDC_LEAD_IMPLANT_DT: NORMAL
MDC_IDC_LEAD_LOCATION: NORMAL
MDC_IDC_LEAD_LOCATION_DETAIL_1: NORMAL
MDC_IDC_LEAD_MFG: NORMAL
MDC_IDC_LEAD_MODEL: NORMAL
MDC_IDC_LEAD_POLARITY_TYPE: NORMAL
MDC_IDC_LEAD_SERIAL: NORMAL
MDC_IDC_MSMT_BATTERY_DTM: NORMAL
MDC_IDC_MSMT_BATTERY_REMAINING_LONGEVITY: 24 MO
MDC_IDC_MSMT_BATTERY_REMAINING_PERCENTAGE: 26 %
MDC_IDC_MSMT_BATTERY_STATUS: NORMAL
MDC_IDC_MSMT_CAP_CHARGE_DTM: NORMAL
MDC_IDC_MSMT_CAP_CHARGE_DTM: NORMAL
MDC_IDC_MSMT_CAP_CHARGE_ENERGY: 1 J
MDC_IDC_MSMT_CAP_CHARGE_TIME: 0.3 S
MDC_IDC_MSMT_CAP_CHARGE_TIME: 10.9 S
MDC_IDC_MSMT_CAP_CHARGE_TYPE: NORMAL
MDC_IDC_MSMT_CAP_CHARGE_TYPE: NORMAL
MDC_IDC_MSMT_LEADCHNL_RV_IMPEDANCE_VALUE: 675 OHM
MDC_IDC_PG_IMPLANT_DTM: NORMAL
MDC_IDC_PG_MFG: NORMAL
MDC_IDC_PG_MODEL: NORMAL
MDC_IDC_PG_SERIAL: NORMAL
MDC_IDC_PG_TYPE: NORMAL
MDC_IDC_SESS_CLINIC_NAME: NORMAL
MDC_IDC_SESS_DTM: NORMAL
MDC_IDC_SESS_TYPE: NORMAL
MDC_IDC_SET_BRADY_LOWRATE: 40 {BEATS}/MIN
MDC_IDC_SET_BRADY_MODE: NORMAL
MDC_IDC_SET_LEADCHNL_RV_PACING_AMPLITUDE: 2.4 V
MDC_IDC_SET_LEADCHNL_RV_PACING_POLARITY: NORMAL
MDC_IDC_SET_LEADCHNL_RV_PACING_PULSEWIDTH: 0.5 MS
MDC_IDC_SET_LEADCHNL_RV_SENSING_ADAPTATION_MODE: NORMAL
MDC_IDC_SET_LEADCHNL_RV_SENSING_POLARITY: NORMAL
MDC_IDC_SET_LEADCHNL_RV_SENSING_SENSITIVITY: 0.6 MV
MDC_IDC_SET_ZONE_DETECTION_INTERVAL: 273 MS
MDC_IDC_SET_ZONE_DETECTION_INTERVAL: 333 MS
MDC_IDC_SET_ZONE_TYPE: NORMAL
MDC_IDC_SET_ZONE_TYPE: NORMAL
MDC_IDC_SET_ZONE_VENDOR_TYPE: NORMAL
MDC_IDC_SET_ZONE_VENDOR_TYPE: NORMAL
MDC_IDC_STAT_BRADY_DTM_END: NORMAL
MDC_IDC_STAT_BRADY_DTM_START: NORMAL
MDC_IDC_STAT_BRADY_RV_PERCENT_PACED: 0 %
MDC_IDC_STAT_EPISODE_RECENT_COUNT: 0
MDC_IDC_STAT_EPISODE_RECENT_COUNT: 1
MDC_IDC_STAT_EPISODE_RECENT_COUNT_DTM_END: NORMAL
MDC_IDC_STAT_EPISODE_RECENT_COUNT_DTM_START: NORMAL
MDC_IDC_STAT_EPISODE_TYPE: NORMAL
MDC_IDC_STAT_EPISODE_VENDOR_TYPE: NORMAL
MDC_IDC_STAT_TACHYTHERAPY_ATP_DELIVERED_RECENT: 0
MDC_IDC_STAT_TACHYTHERAPY_ATP_DELIVERED_TOTAL: 0
MDC_IDC_STAT_TACHYTHERAPY_RECENT_DTM_END: NORMAL
MDC_IDC_STAT_TACHYTHERAPY_RECENT_DTM_START: NORMAL
MDC_IDC_STAT_TACHYTHERAPY_SHOCKS_ABORTED_RECENT: 0
MDC_IDC_STAT_TACHYTHERAPY_SHOCKS_ABORTED_TOTAL: 0
MDC_IDC_STAT_TACHYTHERAPY_SHOCKS_DELIVERED_RECENT: 0
MDC_IDC_STAT_TACHYTHERAPY_SHOCKS_DELIVERED_TOTAL: 1
MDC_IDC_STAT_TACHYTHERAPY_TOTAL_DTM_END: NORMAL
MDC_IDC_STAT_TACHYTHERAPY_TOTAL_DTM_START: NORMAL

## 2022-08-31 ENCOUNTER — ANCILLARY PROCEDURE (OUTPATIENT)
Dept: CARDIOLOGY | Facility: CLINIC | Age: 67
End: 2022-08-31
Attending: INTERNAL MEDICINE
Payer: MEDICARE

## 2022-08-31 DIAGNOSIS — I25.5 ISCHEMIC CARDIOMYOPATHY: ICD-10-CM

## 2022-08-31 PROCEDURE — 93295 DEV INTERROG REMOTE 1/2/MLT: CPT | Performed by: INTERNAL MEDICINE

## 2022-08-31 PROCEDURE — 93296 REM INTERROG EVL PM/IDS: CPT

## 2022-09-03 LAB
MDC_IDC_EPISODE_DTM: NORMAL
MDC_IDC_EPISODE_DURATION: 6 S
MDC_IDC_EPISODE_DURATION: 6 S
MDC_IDC_EPISODE_DURATION: 7 S
MDC_IDC_EPISODE_DURATION: 9 S
MDC_IDC_EPISODE_ID: NORMAL
MDC_IDC_EPISODE_TYPE: NORMAL
MDC_IDC_LEAD_IMPLANT_DT: NORMAL
MDC_IDC_LEAD_LOCATION: NORMAL
MDC_IDC_LEAD_LOCATION_DETAIL_1: NORMAL
MDC_IDC_LEAD_MFG: NORMAL
MDC_IDC_LEAD_MODEL: NORMAL
MDC_IDC_LEAD_POLARITY_TYPE: NORMAL
MDC_IDC_LEAD_SERIAL: NORMAL
MDC_IDC_MSMT_BATTERY_DTM: NORMAL
MDC_IDC_MSMT_BATTERY_REMAINING_LONGEVITY: 18 MO
MDC_IDC_MSMT_BATTERY_REMAINING_PERCENTAGE: 24 %
MDC_IDC_MSMT_BATTERY_STATUS: NORMAL
MDC_IDC_MSMT_CAP_CHARGE_DTM: NORMAL
MDC_IDC_MSMT_CAP_CHARGE_DTM: NORMAL
MDC_IDC_MSMT_CAP_CHARGE_ENERGY: 1 J
MDC_IDC_MSMT_CAP_CHARGE_TIME: 0.3 S
MDC_IDC_MSMT_CAP_CHARGE_TIME: 11 S
MDC_IDC_MSMT_CAP_CHARGE_TYPE: NORMAL
MDC_IDC_MSMT_CAP_CHARGE_TYPE: NORMAL
MDC_IDC_MSMT_LEADCHNL_RV_IMPEDANCE_VALUE: 687 OHM
MDC_IDC_PG_IMPLANT_DTM: NORMAL
MDC_IDC_PG_MFG: NORMAL
MDC_IDC_PG_MODEL: NORMAL
MDC_IDC_PG_SERIAL: NORMAL
MDC_IDC_PG_TYPE: NORMAL
MDC_IDC_SESS_CLINIC_NAME: NORMAL
MDC_IDC_SESS_DTM: NORMAL
MDC_IDC_SESS_TYPE: NORMAL
MDC_IDC_SET_BRADY_LOWRATE: 40 {BEATS}/MIN
MDC_IDC_SET_BRADY_MODE: NORMAL
MDC_IDC_SET_LEADCHNL_RV_PACING_AMPLITUDE: 2.4 V
MDC_IDC_SET_LEADCHNL_RV_PACING_POLARITY: NORMAL
MDC_IDC_SET_LEADCHNL_RV_PACING_PULSEWIDTH: 0.5 MS
MDC_IDC_SET_LEADCHNL_RV_SENSING_ADAPTATION_MODE: NORMAL
MDC_IDC_SET_LEADCHNL_RV_SENSING_POLARITY: NORMAL
MDC_IDC_SET_LEADCHNL_RV_SENSING_SENSITIVITY: 0.6 MV
MDC_IDC_SET_ZONE_DETECTION_INTERVAL: 273 MS
MDC_IDC_SET_ZONE_DETECTION_INTERVAL: 333 MS
MDC_IDC_SET_ZONE_TYPE: NORMAL
MDC_IDC_SET_ZONE_TYPE: NORMAL
MDC_IDC_SET_ZONE_VENDOR_TYPE: NORMAL
MDC_IDC_SET_ZONE_VENDOR_TYPE: NORMAL
MDC_IDC_STAT_BRADY_DTM_END: NORMAL
MDC_IDC_STAT_BRADY_DTM_START: NORMAL
MDC_IDC_STAT_BRADY_RV_PERCENT_PACED: 0 %
MDC_IDC_STAT_EPISODE_RECENT_COUNT: 0
MDC_IDC_STAT_EPISODE_RECENT_COUNT: 4
MDC_IDC_STAT_EPISODE_RECENT_COUNT_DTM_END: NORMAL
MDC_IDC_STAT_EPISODE_RECENT_COUNT_DTM_START: NORMAL
MDC_IDC_STAT_EPISODE_TYPE: NORMAL
MDC_IDC_STAT_EPISODE_VENDOR_TYPE: NORMAL
MDC_IDC_STAT_TACHYTHERAPY_ATP_DELIVERED_RECENT: 0
MDC_IDC_STAT_TACHYTHERAPY_ATP_DELIVERED_TOTAL: 0
MDC_IDC_STAT_TACHYTHERAPY_RECENT_DTM_END: NORMAL
MDC_IDC_STAT_TACHYTHERAPY_RECENT_DTM_START: NORMAL
MDC_IDC_STAT_TACHYTHERAPY_SHOCKS_ABORTED_RECENT: 0
MDC_IDC_STAT_TACHYTHERAPY_SHOCKS_ABORTED_TOTAL: 0
MDC_IDC_STAT_TACHYTHERAPY_SHOCKS_DELIVERED_RECENT: 0
MDC_IDC_STAT_TACHYTHERAPY_SHOCKS_DELIVERED_TOTAL: 1
MDC_IDC_STAT_TACHYTHERAPY_TOTAL_DTM_END: NORMAL
MDC_IDC_STAT_TACHYTHERAPY_TOTAL_DTM_START: NORMAL

## 2022-09-10 ENCOUNTER — HEALTH MAINTENANCE LETTER (OUTPATIENT)
Age: 67
End: 2022-09-10

## 2022-12-01 ENCOUNTER — ANCILLARY PROCEDURE (OUTPATIENT)
Dept: CARDIOLOGY | Facility: CLINIC | Age: 67
End: 2022-12-01
Attending: INTERNAL MEDICINE
Payer: MEDICARE

## 2022-12-01 DIAGNOSIS — I25.5 ISCHEMIC CARDIOMYOPATHY: ICD-10-CM

## 2022-12-01 PROCEDURE — 93296 REM INTERROG EVL PM/IDS: CPT

## 2022-12-01 PROCEDURE — 93295 DEV INTERROG REMOTE 1/2/MLT: CPT | Performed by: INTERNAL MEDICINE

## 2022-12-05 NOTE — TELEPHONE ENCOUNTER
Called Nitish to review lab results. Discussed that potassium was low at 3.3 He reports he hasn't been taking prescribed potassium but started a few days ago when he developed palpitations. He is also wondering if the claritin he has been taking for allergies was playing a role so he has stopped this as well. Discussed that he should begin taking potassium 10mEq 3x week as ordered. Will plan for lab recheck in 2 weeks. Asked him to call if palpitations continue as we may need to do event monitor if they do continue. He states understanding, no questions at this time.    Patient states she has been taking her thyroid medication every day. States she has had a bunch of the medication at home d/t not remembering to take it. States when she spoke to writer last, she took the advise of picking a time to take the medication and patient states that has worked ever since.          Kirby Shall called requesting a refill of the below medication which has been pended for you:     Requested Prescriptions     Pending Prescriptions Disp Refills    losartan (COZAAR) 25 MG tablet [Pharmacy Med Name: LOSARTAN TABS 25MG] 90 tablet 3     Sig: TAKE 1 TABLET DAILY    atenolol (TENORMIN) 50 MG tablet [Pharmacy Med Name: ATENOLOL TABS 50MG] 90 tablet 3     Sig: TAKE 1 TABLET DAILY    levothyroxine (SYNTHROID) 25 MCG tablet [Pharmacy Med Name: L-THYROXINE (SYNTHROID) TABS 25MCG] 90 tablet 3     Sig: TAKE 1 TABLET DAILY FIRST THING IN THE MORNING, ON AN EMPTY STOMACH, 30 MINUTES PRIOR TO OTHER MEDICATIONS OR FOOD       Last Appointment Date: 12/2/2022  Next Appointment Date: 3/17/2023    Allergies   Allergen Reactions    Benazepril Other (See Comments)     cough    Dynabac [Dirithromycin]     Lisinopril Other (See Comments)     Cough      Other      Generic Prozac

## 2022-12-10 LAB
MDC_IDC_LEAD_IMPLANT_DT: NORMAL
MDC_IDC_LEAD_LOCATION: NORMAL
MDC_IDC_LEAD_LOCATION_DETAIL_1: NORMAL
MDC_IDC_LEAD_MFG: NORMAL
MDC_IDC_LEAD_MODEL: NORMAL
MDC_IDC_LEAD_POLARITY_TYPE: NORMAL
MDC_IDC_LEAD_SERIAL: NORMAL
MDC_IDC_MSMT_BATTERY_DTM: NORMAL
MDC_IDC_MSMT_BATTERY_REMAINING_LONGEVITY: 18 MO
MDC_IDC_MSMT_BATTERY_REMAINING_PERCENTAGE: 22 %
MDC_IDC_MSMT_BATTERY_STATUS: NORMAL
MDC_IDC_MSMT_CAP_CHARGE_DTM: NORMAL
MDC_IDC_MSMT_CAP_CHARGE_DTM: NORMAL
MDC_IDC_MSMT_CAP_CHARGE_ENERGY: 1 J
MDC_IDC_MSMT_CAP_CHARGE_TIME: 0.3 S
MDC_IDC_MSMT_CAP_CHARGE_TIME: 10.8 S
MDC_IDC_MSMT_CAP_CHARGE_TYPE: NORMAL
MDC_IDC_MSMT_CAP_CHARGE_TYPE: NORMAL
MDC_IDC_MSMT_LEADCHNL_RV_IMPEDANCE_VALUE: 642 OHM
MDC_IDC_PG_IMPLANT_DTM: NORMAL
MDC_IDC_PG_MFG: NORMAL
MDC_IDC_PG_MODEL: NORMAL
MDC_IDC_PG_SERIAL: NORMAL
MDC_IDC_PG_TYPE: NORMAL
MDC_IDC_SESS_CLINIC_NAME: NORMAL
MDC_IDC_SESS_DTM: NORMAL
MDC_IDC_SESS_TYPE: NORMAL
MDC_IDC_SET_BRADY_LOWRATE: 40 {BEATS}/MIN
MDC_IDC_SET_BRADY_MODE: NORMAL
MDC_IDC_SET_LEADCHNL_RV_PACING_AMPLITUDE: 2.4 V
MDC_IDC_SET_LEADCHNL_RV_PACING_POLARITY: NORMAL
MDC_IDC_SET_LEADCHNL_RV_PACING_PULSEWIDTH: 0.5 MS
MDC_IDC_SET_LEADCHNL_RV_SENSING_ADAPTATION_MODE: NORMAL
MDC_IDC_SET_LEADCHNL_RV_SENSING_POLARITY: NORMAL
MDC_IDC_SET_LEADCHNL_RV_SENSING_SENSITIVITY: 0.6 MV
MDC_IDC_SET_ZONE_DETECTION_INTERVAL: 273 MS
MDC_IDC_SET_ZONE_DETECTION_INTERVAL: 333 MS
MDC_IDC_SET_ZONE_TYPE: NORMAL
MDC_IDC_SET_ZONE_TYPE: NORMAL
MDC_IDC_SET_ZONE_VENDOR_TYPE: NORMAL
MDC_IDC_SET_ZONE_VENDOR_TYPE: NORMAL
MDC_IDC_STAT_BRADY_DTM_END: NORMAL
MDC_IDC_STAT_BRADY_DTM_START: NORMAL
MDC_IDC_STAT_BRADY_RV_PERCENT_PACED: 0 %
MDC_IDC_STAT_EPISODE_RECENT_COUNT: 0
MDC_IDC_STAT_EPISODE_RECENT_COUNT_DTM_END: NORMAL
MDC_IDC_STAT_EPISODE_RECENT_COUNT_DTM_START: NORMAL
MDC_IDC_STAT_EPISODE_TYPE: NORMAL
MDC_IDC_STAT_EPISODE_VENDOR_TYPE: NORMAL
MDC_IDC_STAT_TACHYTHERAPY_ATP_DELIVERED_RECENT: 0
MDC_IDC_STAT_TACHYTHERAPY_ATP_DELIVERED_TOTAL: 0
MDC_IDC_STAT_TACHYTHERAPY_RECENT_DTM_END: NORMAL
MDC_IDC_STAT_TACHYTHERAPY_RECENT_DTM_START: NORMAL
MDC_IDC_STAT_TACHYTHERAPY_SHOCKS_ABORTED_RECENT: 0
MDC_IDC_STAT_TACHYTHERAPY_SHOCKS_ABORTED_TOTAL: 0
MDC_IDC_STAT_TACHYTHERAPY_SHOCKS_DELIVERED_RECENT: 0
MDC_IDC_STAT_TACHYTHERAPY_SHOCKS_DELIVERED_TOTAL: 1
MDC_IDC_STAT_TACHYTHERAPY_TOTAL_DTM_END: NORMAL
MDC_IDC_STAT_TACHYTHERAPY_TOTAL_DTM_START: NORMAL

## 2023-02-20 DIAGNOSIS — I50.22 CHRONIC SYSTOLIC CONGESTIVE HEART FAILURE (H): ICD-10-CM

## 2023-02-23 RX ORDER — CARVEDILOL 12.5 MG/1
TABLET ORAL
Qty: 270 TABLET | Refills: 0 | Status: SHIPPED | OUTPATIENT
Start: 2023-02-23 | End: 2023-04-11

## 2023-03-08 ENCOUNTER — ANCILLARY PROCEDURE (OUTPATIENT)
Dept: CARDIOLOGY | Facility: CLINIC | Age: 68
End: 2023-03-08
Attending: INTERNAL MEDICINE
Payer: MEDICARE

## 2023-03-08 DIAGNOSIS — I25.5 ISCHEMIC CARDIOMYOPATHY: ICD-10-CM

## 2023-03-08 PROCEDURE — 93295 DEV INTERROG REMOTE 1/2/MLT: CPT | Performed by: INTERNAL MEDICINE

## 2023-03-08 PROCEDURE — 93296 REM INTERROG EVL PM/IDS: CPT

## 2023-03-20 LAB
MDC_IDC_EPISODE_DTM: NORMAL
MDC_IDC_EPISODE_DTM: NORMAL
MDC_IDC_EPISODE_DURATION: 8 S
MDC_IDC_EPISODE_ID: NORMAL
MDC_IDC_EPISODE_ID: NORMAL
MDC_IDC_EPISODE_TYPE: NORMAL
MDC_IDC_EPISODE_TYPE: NORMAL
MDC_IDC_LEAD_IMPLANT_DT: NORMAL
MDC_IDC_LEAD_LOCATION: NORMAL
MDC_IDC_LEAD_LOCATION_DETAIL_1: NORMAL
MDC_IDC_LEAD_MFG: NORMAL
MDC_IDC_LEAD_MODEL: NORMAL
MDC_IDC_LEAD_POLARITY_TYPE: NORMAL
MDC_IDC_LEAD_SERIAL: NORMAL
MDC_IDC_MSMT_BATTERY_DTM: NORMAL
MDC_IDC_MSMT_BATTERY_REMAINING_LONGEVITY: 18 MO
MDC_IDC_MSMT_BATTERY_REMAINING_PERCENTAGE: 18 %
MDC_IDC_MSMT_BATTERY_STATUS: NORMAL
MDC_IDC_MSMT_CAP_CHARGE_DTM: NORMAL
MDC_IDC_MSMT_CAP_CHARGE_DTM: NORMAL
MDC_IDC_MSMT_CAP_CHARGE_ENERGY: 1 J
MDC_IDC_MSMT_CAP_CHARGE_TIME: 0.3 S
MDC_IDC_MSMT_CAP_CHARGE_TIME: 11.1 S
MDC_IDC_MSMT_CAP_CHARGE_TYPE: NORMAL
MDC_IDC_MSMT_CAP_CHARGE_TYPE: NORMAL
MDC_IDC_MSMT_LEADCHNL_RV_IMPEDANCE_VALUE: 660 OHM
MDC_IDC_PG_IMPLANT_DTM: NORMAL
MDC_IDC_PG_MFG: NORMAL
MDC_IDC_PG_MODEL: NORMAL
MDC_IDC_PG_SERIAL: NORMAL
MDC_IDC_PG_TYPE: NORMAL
MDC_IDC_SESS_CLINIC_NAME: NORMAL
MDC_IDC_SESS_DTM: NORMAL
MDC_IDC_SESS_TYPE: NORMAL
MDC_IDC_SET_BRADY_LOWRATE: 40 {BEATS}/MIN
MDC_IDC_SET_BRADY_MODE: NORMAL
MDC_IDC_SET_LEADCHNL_RV_PACING_AMPLITUDE: 2.4 V
MDC_IDC_SET_LEADCHNL_RV_PACING_POLARITY: NORMAL
MDC_IDC_SET_LEADCHNL_RV_PACING_PULSEWIDTH: 0.5 MS
MDC_IDC_SET_LEADCHNL_RV_SENSING_ADAPTATION_MODE: NORMAL
MDC_IDC_SET_LEADCHNL_RV_SENSING_POLARITY: NORMAL
MDC_IDC_SET_LEADCHNL_RV_SENSING_SENSITIVITY: 0.6 MV
MDC_IDC_SET_ZONE_DETECTION_INTERVAL: 273 MS
MDC_IDC_SET_ZONE_DETECTION_INTERVAL: 333 MS
MDC_IDC_SET_ZONE_TYPE: NORMAL
MDC_IDC_SET_ZONE_TYPE: NORMAL
MDC_IDC_SET_ZONE_VENDOR_TYPE: NORMAL
MDC_IDC_SET_ZONE_VENDOR_TYPE: NORMAL
MDC_IDC_STAT_BRADY_DTM_END: NORMAL
MDC_IDC_STAT_BRADY_DTM_START: NORMAL
MDC_IDC_STAT_BRADY_RV_PERCENT_PACED: 0 %
MDC_IDC_STAT_EPISODE_RECENT_COUNT: 0
MDC_IDC_STAT_EPISODE_RECENT_COUNT: 1
MDC_IDC_STAT_EPISODE_RECENT_COUNT_DTM_END: NORMAL
MDC_IDC_STAT_EPISODE_RECENT_COUNT_DTM_START: NORMAL
MDC_IDC_STAT_EPISODE_TYPE: NORMAL
MDC_IDC_STAT_EPISODE_VENDOR_TYPE: NORMAL
MDC_IDC_STAT_TACHYTHERAPY_ATP_DELIVERED_RECENT: 0
MDC_IDC_STAT_TACHYTHERAPY_ATP_DELIVERED_TOTAL: 0
MDC_IDC_STAT_TACHYTHERAPY_RECENT_DTM_END: NORMAL
MDC_IDC_STAT_TACHYTHERAPY_RECENT_DTM_START: NORMAL
MDC_IDC_STAT_TACHYTHERAPY_SHOCKS_ABORTED_RECENT: 0
MDC_IDC_STAT_TACHYTHERAPY_SHOCKS_ABORTED_TOTAL: 0
MDC_IDC_STAT_TACHYTHERAPY_SHOCKS_DELIVERED_RECENT: 0
MDC_IDC_STAT_TACHYTHERAPY_SHOCKS_DELIVERED_TOTAL: 1
MDC_IDC_STAT_TACHYTHERAPY_TOTAL_DTM_END: NORMAL
MDC_IDC_STAT_TACHYTHERAPY_TOTAL_DTM_START: NORMAL

## 2023-04-03 ENCOUNTER — LAB (OUTPATIENT)
Dept: LAB | Facility: CLINIC | Age: 68
End: 2023-04-03
Attending: INTERNAL MEDICINE
Payer: COMMERCIAL

## 2023-04-03 ENCOUNTER — OFFICE VISIT (OUTPATIENT)
Dept: CARDIOLOGY | Facility: CLINIC | Age: 68
End: 2023-04-03
Attending: INTERNAL MEDICINE
Payer: MEDICARE

## 2023-04-03 VITALS
HEART RATE: 72 BPM | OXYGEN SATURATION: 97 % | DIASTOLIC BLOOD PRESSURE: 85 MMHG | SYSTOLIC BLOOD PRESSURE: 134 MMHG | BODY MASS INDEX: 24.34 KG/M2 | WEIGHT: 155.4 LBS

## 2023-04-03 DIAGNOSIS — I50.22 CHRONIC SYSTOLIC CONGESTIVE HEART FAILURE (H): ICD-10-CM

## 2023-04-03 DIAGNOSIS — I50.22 CHRONIC SYSTOLIC CONGESTIVE HEART FAILURE (H): Primary | ICD-10-CM

## 2023-04-03 LAB
ANION GAP SERPL CALCULATED.3IONS-SCNC: 9 MMOL/L (ref 7–15)
BUN SERPL-MCNC: 5.7 MG/DL (ref 8–23)
CALCIUM SERPL-MCNC: 9.1 MG/DL (ref 8.8–10.2)
CHLORIDE SERPL-SCNC: 100 MMOL/L (ref 98–107)
CREAT SERPL-MCNC: 0.83 MG/DL (ref 0.67–1.17)
DEPRECATED HCO3 PLAS-SCNC: 29 MMOL/L (ref 22–29)
GFR SERPL CREATININE-BSD FRML MDRD: >90 ML/MIN/1.73M2
GLUCOSE SERPL-MCNC: 111 MG/DL (ref 70–99)
POTASSIUM SERPL-SCNC: 4 MMOL/L (ref 3.4–5.3)
SODIUM SERPL-SCNC: 138 MMOL/L (ref 136–145)

## 2023-04-03 PROCEDURE — 99215 OFFICE O/P EST HI 40 MIN: CPT | Performed by: INTERNAL MEDICINE

## 2023-04-03 PROCEDURE — 82310 ASSAY OF CALCIUM: CPT

## 2023-04-03 PROCEDURE — 36415 COLL VENOUS BLD VENIPUNCTURE: CPT

## 2023-04-03 PROCEDURE — G0463 HOSPITAL OUTPT CLINIC VISIT: HCPCS | Performed by: INTERNAL MEDICINE

## 2023-04-03 RX ORDER — EZETIMIBE 10 MG/1
1 TABLET ORAL
COMMUNITY
Start: 2023-01-26

## 2023-04-03 NOTE — PROGRESS NOTES
[facility]    April 3, 2023     Dear Colleagues:        We had the pleasure of seeing Mr. Matt Hester in the [facility] today.    68 year-old man with a long-standing history of ischemic cardiomyopathy, and I will detail his cardiac history below.     His first MI was in 1995.  In 2003, he had an anterior wall MI requiring stenting.  He had a single-chamber ICD implanted in 03/2004 for primary prevention and a generator change on 01/2010 and again in 06/2011.  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  He has regained function of his left arm but his visual deficit persists.    Overall he has had very long-term stability in his cardiac function with no functional limitation and no diuretic requirement.   We had tried to put him on Entresto previously and he did not tolerate it.     He is presently on beta-blocker spironolactone and enalapril and tolerating these well.  He continues to not need any diuretics.     He did have COVID in the fall in the setting of having COVID-19 he was hospitalized for several weeks and did have what was thought to be a new stroke.  He remains on Coumadin long-term for history of LV thrombus and history of stroke without recovery in LV function.     He has no residual deficits from this most recent small stroke and overall feels he is made a full recovery.     No orthopnea, PND, chest pain, palpitations, syncope, ICD shocks.        About a year ago he had a recurrent LV thrombus for which we put him on Coumadin and aspirin along for 6 months.         PAST MEDICAL HISTORY:  Past Medical History   Diagnosis Date     Chronic systolic congestive heart failure (H)      Ischemic cardiomyopathy      ICD (implantable cardioverter-defibrillator) in place      HTN (hypertension)      Hyperlipidemia      Peripheral  vascular disease (H)      CVA (cerebral vascular accident) (H)      Atrial fibrillation (H), paroxysmal     TIA     Other medical history includes   - colonic polyp in 11/2010   - atrophic gastritis  -  Haro esophagus   - benign neoplasm of the colon in 2011   - chronic abdominal pain  -  diverticulosis, bile duct hematoma per liver biopsy,  - colectomy 01/05/2011  - a stroke in 10/2011   - right occipital residual vision changes  - Hypertension   - nasal polyp   - ICD placement in 06/2011 for primary prevention.      ALLERGIES:     1.  Aggrenox, severe nausea.   2.  Alcohol, hives.   3.  Effient, rash and itching.   4.  Demerol, itching, nausea and vomiting.   5.  Flagyl.   6.  Levaquin.     Current Outpatient Medications   Medication Sig Dispense Refill     carvedilol (COREG) 12.5 MG tablet Take 1 1/2 tabs  tablet 0     enalapril (VASOTEC) 5 MG tablet Take 1 tablet (5 mg) by mouth 2 times daily 180 tablet 1     ezetimibe (ZETIA) 10 MG tablet Take 1 tablet by mouth daily at 2 pm       potassium chloride ER (KLOR-CON M) 10 MEQ CR tablet Take 1 tablet by mouth three times per week.       rosuvastatin (CRESTOR) 40 MG tablet Take 1 tablet (40 mg) by mouth daily 90 tablet 1     SPIRONOLACTONE PO Take 12.5 mg by mouth daily       Coumadin     SOCIAL HISTORY:  The patient works full-time in the construction business.  He does not drink, smoke or use drugs, and he never has.  [place] of [place].  He has 1 son and a grandson that he is very close with and now another grand daughter        ROS:   Constitutional: No fever, chills, or sweats.  Weight is recently stable.   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  No ear ache, epistaxis, sore throat.   Allergies/Immunologic: Negative.   Respiratory: No cough, hemoptysis.   Cardiovascular: As per HPI.   GI: No nausea, vomiting, hematemesis, melena, or hematochezia.  : No urinary frequency, dysuria, or hematuria.   Integument: Negative.   Psychiatric: Negative.    Neuro: Negative.   Endocrinology: Negative.   Musculoskeletal: Negative.    EXAM:  /85 (BP Location: Right arm, Patient Position: Sitting, Cuff Size: Adult Small)   Pulse 72   Wt 70.5 kg (155 lb 6.4 oz)   SpO2 97%   BMI 24.34 kg/m    General: appears comfortable, alert and articulate  Head: normocephalic, atraumatic  Eyes: anicteric sclera, EOMI  Neck: no adenopathy  Orophyarynx: moist mucosa, no lesions, dentition intact  Heart: regular, S1/S2, no murmur, gallop, rub, estimated JVP  <8 without HJR  Lungs: clear, no rales or wheezing  Abdomen: soft, non-tender, bowel sounds present, no hepatosplenomegaly  Extremities: no clubbing, cyanosis or edema  Neurological: normal speech and affect, no gross motor deficits        Labs: Stable renal function, NT proBNP is pending      Last angiogram:     1. Widely patent stents in the ramus intermedius and LAD.   2. Two-vessel coronary artery disease.  3. Moderate left ventricular chamber enlargement.   4. Left ventricular systolic function is severely reduced with a visually estimated ejection fraction of 15 to 20% and global hypokinesis.   5. Anterior apical and inferior akinesis.   6. Possible left ventricular thrombus.     Echo October 2022:  Qualitative ejection fraction is 20-25% (severely reduced).   Wall motion abnormalities are present. Anteroseptal akinesis. Anterior hypokinesis. Inferior hypokinesis. The inferior and inferolateral wall segments appears   relatively spared.   Normal right ventricular systolic function.   The right ventricular cavity size is qualitatively normal.   Right ventricle appears hypertrophied.   Catheter/pacemaker lead noted within the right ventricle.   Normal chamber sizes.   Mildly abnormal diastolic function (Grade I), may be normal variant for age.   No clinically significant valvular regurgitation or stenosis.   Pulmnary artery pressure could not be estimated.   TR signal inadequate to allow accurate estimate RV systolic  pressure.   Trace pericardial effusion.   Compared to the previous echo dated 2/27/2020, no signficant change.         Left Ventricle   The left ventricular systolic function is severely reduced. Qualitative ejection fraction is 20-25% (severely reduced). The left ventricle is normal size. There   is normal left ventricular wall thickness. Wall motion abnormalities are present. Anteroseptal akinesis. Anterior hypokinesis. Inferior hypokinesis. The   inferior and inferolateral wall segments appears relatively spared.     Right Ventricle   Normal right ventricular systolic function. The right ventricular cavity size is qualitatively normal. Catheter/pacemaker lead noted within the right ventricle.   Right ventricle appears hypertrophied.     Atria   Left atrium is qualitatively normal. Left atrium is 19.2 ml/m^2 by volume. Right atrium is qualitatively normal in size.     Diastolic Function   Mildly abnormal diastolic function (Grade I), may be normal variant for age.     Aortic Valve   The aortic valve is tricuspid. No significant aortic insufficiency. There is no aortic stenosis.     Mitral Valve   The mitral valve anatomy is normal. There is no significant mitral regurgitation. No mitral valve stenosis.     Tricuspid Valve   The tricuspid valve anatomy is normal. No significant tricuspid regurgitation. There is no tricuspid stenosis.     Pulmonic Valve   Pulmonic valve not well visualized. No significant pulmonic regurgitation. No pulmonic stenosis.     Pericardium/Pleura   Trace pericardial effusion.     Vessels   The aortic root is normal in size for body surface area. Aortic root measures 3.6 cm cm in diameter. Proximal ascending aorta is normal in size for body surface   area. Proximal ascending aorta measures 3.4 cm cm in diameter.     Septae   No color doppler evidence for atrial septal defect or patent foramen ovale. A bubble study was not performed.     Hemodynamics   TR signal inadequate to allow accurate  estimate RV systolic pressure. Inferior vena cava size normal and collapsibility > 50% normal indicating normal right   atrial pressure (3 mm Hg).       Assessment and Plan:   In summary, this is a very pleasant, 65year-old man with a long-standing history of ischemic cardiomyopathy with an ejection fraction in the 25% range (now 35 %) who has been stable on medical therapy for many years with an ICD for primary prevention.      Today, he appears euvolemic without evidence of volume overload and has been without any heart failure hospitalizations.  He is NYHA class II, stage C today.      With regard to his HF-he has not tolerated Entresto in the past, he is on enalapril, beta-blocker spironolactone and requiring no diuretics.  Euvolemic on exam today.  Stable endorgan function.  He would not have qualified for the Farxiga trial given absence of real limitation as well his lack of elevated natruretic peptide's and no diuretic requirement.  Should he worsen in the future that would be my next move      For his CAD; he is on statin,coumadin  not having any angina, BB     Prior CVAs: He had one in [id number] was negative that time he was on aspirin Plavix for period of time and then there was an LV thrombus identified, he has been on Coumadin since then and remains on Coumadin-minimal residual deficits      With regard to prevention of sudden cardiac death, ICD present - no arrhythmias on most recent device check    He will have an echo in October with his cardiologist near [place] and I will see him at his next annual with me.      Kimberly Maynard MD   of Medicine   [facility] Division of Cardiology         CC  Patient Care Team:  Leidy Everett MD as PCP - General (Family Medicine)  Caesar, Kimberly Ng MD as MD (Cardiology)  Jeanie Hernandez APRN CNP as Nurse Practitioner (Nurse Practitioner - Gerontology)  Kristen Obrien, ALETA as Specialty Care Coordinator  (Cardiology)  Isabell Steel, RN as Registered Nurse (Cardiology)  Edel Valdivia, APRN CNP as Assigned Heart and Vascular Provider  EDEL VALDIVIA

## 2023-04-03 NOTE — LETTER
4/3/2023      RE: Matt Hester  [address]  [facility] [address]       Dear Colleague,    Thank you for the opportunity to participate in the care of your patient, Matt Hester, at the [facility] [place] [facility]. Please see a copy of my visit note below.    [facility]    April 3, 2023     Dear Colleagues:        We had the pleasure of seeing Mr. Matt Hester in the [facility] today.    68 year-old man with a long-standing history of ischemic cardiomyopathy, and I will detail his cardiac history below.     His first MI was in 1995.  In 2003, he had an anterior wall MI requiring stenting.  He had a single-chamber ICD implanted in 03/2004 for primary prevention and a generator change on 01/2010 and again in 06/2011.  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  He has regained function of his left arm but his visual deficit persists.    Overall he has had very long-term stability in his cardiac function with no functional limitation and no diuretic requirement.   We had tried to put him on Entresto previously and he did not tolerate it.     He is presently on beta-blocker spironolactone and enalapril and tolerating these well.  He continues to not need any diuretics.     He did have COVID in the fall in the setting of having COVID-19 he was hospitalized for several weeks and did have what was thought to be a new stroke.  He remains on Coumadin long-term for history of LV thrombus and history of stroke without recovery in LV function.     He has no residual deficits from this most recent small stroke and overall feels he is made a full recovery.     No orthopnea, PND, chest pain, palpitations, syncope, ICD shocks.        About a year ago he had a recurrent LV  thrombus for which we put him on Coumadin and aspirin along for 6 months.         PAST MEDICAL HISTORY:  Past Medical History   Diagnosis Date    Chronic systolic congestive heart failure (H)     Ischemic cardiomyopathy     ICD (implantable cardioverter-defibrillator) in place     HTN (hypertension)     Hyperlipidemia     Peripheral vascular disease (H)     CVA (cerebral vascular accident) (H)     Atrial fibrillation (H), paroxysmal     TIA     Other medical history includes   - colonic polyp in 11/2010   - atrophic gastritis  -  Haro esophagus   - benign neoplasm of the colon in 2011   - chronic abdominal pain  -  diverticulosis, bile duct hematoma per liver biopsy,  - colectomy 01/05/2011  - a stroke in 10/2011   - right occipital residual vision changes  - Hypertension   - nasal polyp   - ICD placement in 06/2011 for primary prevention.      ALLERGIES:     1.  Aggrenox, severe nausea.   2.  Alcohol, hives.   3.  Effient, rash and itching.   4.  Demerol, itching, nausea and vomiting.   5.  Flagyl.   6.  Levaquin.     Current Outpatient Medications   Medication Sig Dispense Refill    carvedilol (COREG) 12.5 MG tablet Take 1 1/2 tabs  tablet 0    enalapril (VASOTEC) 5 MG tablet Take 1 tablet (5 mg) by mouth 2 times daily 180 tablet 1    ezetimibe (ZETIA) 10 MG tablet Take 1 tablet by mouth daily at 2 pm      potassium chloride ER (KLOR-CON M) 10 MEQ CR tablet Take 1 tablet by mouth three times per week.      rosuvastatin (CRESTOR) 40 MG tablet Take 1 tablet (40 mg) by mouth daily 90 tablet 1    SPIRONOLACTONE PO Take 12.5 mg by mouth daily       Coumadin     SOCIAL HISTORY:  The patient works full-time in the construction business.  He does not drink, smoke or use drugs, and he never has.  [place] lives [place] 20 minutes north of [place].  He has 1 son and a grandson that he is very close with and now another grand daughter        ROS:   Constitutional: No fever, chills, or sweats.  Weight is recently stable.   ENT:  Blind in left eye.  No ear ache, epistaxis, sore throat.   Allergies/Immunologic: Negative.   Respiratory: No cough, hemoptysis.   Cardiovascular: As per HPI.   GI: No nausea, vomiting, hematemesis, melena, or hematochezia.  : No urinary frequency, dysuria, or hematuria.   Integument: Negative.   Psychiatric: Negative.   Neuro: Negative.   Endocrinology: Negative.   Musculoskeletal: Negative.    EXAM:  /85 (BP Location: Right arm, Patient Position: Sitting, Cuff Size: Adult Small)   Pulse 72   Wt 70.5 kg (155 lb 6.4 oz)   SpO2 97%   BMI 24.34 kg/m    General: appears comfortable, alert and articulate  Head: normocephalic, atraumatic  Eyes: anicteric sclera, EOMI  Neck: no adenopathy  Orophyarynx: moist mucosa, no lesions, dentition intact  Heart: regular, S1/S2, no murmur, gallop, rub, estimated JVP  <8 without HJR  Lungs: clear, no rales or wheezing  Abdomen: soft, non-tender, bowel sounds present, no hepatosplenomegaly  Extremities: no clubbing, cyanosis or edema  Neurological: normal speech and affect, no gross motor deficits        Labs: Stable renal function, NT proBNP is pending      Last angiogram:     1. Widely patent stents in the ramus intermedius and LAD.   2. Two-vessel coronary artery disease.  3. Moderate left ventricular chamber enlargement.   4. Left ventricular systolic function is severely reduced with a visually estimated ejection fraction of 15 to 20% and global hypokinesis.   5. Anterior apical and inferior akinesis.   6. Possible left ventricular thrombus.     Echo October 2022:  Qualitative ejection fraction is 20-25% (severely reduced).   Wall motion abnormalities are present. Anteroseptal akinesis. Anterior hypokinesis. Inferior hypokinesis. The inferior and inferolateral wall segments appears   relatively spared.   Normal right ventricular systolic function.   The right ventricular cavity size is qualitatively normal.   Right ventricle appears hypertrophied.   Catheter/pacemaker  lead noted within the right ventricle.   Normal chamber sizes.   Mildly abnormal diastolic function (Grade I), may be normal variant for age.   No clinically significant valvular regurgitation or stenosis.   Pulmnary artery pressure could not be estimated.   TR signal inadequate to allow accurate estimate RV systolic pressure.   Trace pericardial effusion.   Compared to the previous echo dated 2/27/2020, no signficant change.         Left Ventricle   The left ventricular systolic function is severely reduced. Qualitative ejection fraction is 20-25% (severely reduced). The left ventricle is normal size. There   is normal left ventricular wall thickness. Wall motion abnormalities are present. Anteroseptal akinesis. Anterior hypokinesis. Inferior hypokinesis. The   inferior and inferolateral wall segments appears relatively spared.     Right Ventricle   Normal right ventricular systolic function. The right ventricular cavity size is qualitatively normal. Catheter/pacemaker lead noted within the right ventricle.   Right ventricle appears hypertrophied.     Atria   Left atrium is qualitatively normal. Left atrium is 19.2 ml/m^2 by volume. Right atrium is qualitatively normal in size.     Diastolic Function   Mildly abnormal diastolic function (Grade I), may be normal variant for age.     Aortic Valve   The aortic valve is tricuspid. No significant aortic insufficiency. There is no aortic stenosis.     Mitral Valve   The mitral valve anatomy is normal. There is no significant mitral regurgitation. No mitral valve stenosis.     Tricuspid Valve   The tricuspid valve anatomy is normal. No significant tricuspid regurgitation. There is no tricuspid stenosis.     Pulmonic Valve   Pulmonic valve not well visualized. No significant pulmonic regurgitation. No pulmonic stenosis.     Pericardium/Pleura   Trace pericardial effusion.     Vessels   The aortic root is normal in size for body surface area. Aortic root measures 3.6 cm cm  in diameter. Proximal ascending aorta is normal in size for body surface   area. Proximal ascending aorta measures 3.4 cm cm in diameter.     Septae   No color doppler evidence for atrial septal defect or patent foramen ovale. A bubble study was not performed.     Hemodynamics   TR signal inadequate to allow accurate estimate RV systolic pressure. Inferior vena cava size normal and collapsibility > 50% normal indicating normal right   atrial pressure (3 mm Hg).       Assessment and Plan:   In summary, this is a very pleasant, 65year-old man with a long-standing history of ischemic cardiomyopathy with an ejection fraction in the 25% range (now 35 %) who has been stable on medical therapy for many years with an ICD for primary prevention.      Today, he appears euvolemic without evidence of volume overload and has been without any heart failure hospitalizations.  He is NYHA class II, stage C today.      With regard to his HF-he has not tolerated Entresto in the past, he is on enalapril, beta-blocker spironolactone and requiring no diuretics.  Euvolemic on exam today.  Stable endorgan function.  He would not have qualified for the Farxiga trial given absence of real limitation as well his lack of elevated natruretic peptide's and no diuretic requirement.  Should he worsen in the future that would be my next move      For his CAD; he is on statin,coumadin  not having any angina, BB     Prior CVAs: He had one in [id number] was negative that time he was on aspirin Plavix for period of time and then there was an LV thrombus identified, he has been on Coumadin since then and remains on Coumadin-minimal residual deficits      With regard to prevention of sudden cardiac death, ICD present - no arrhythmias on most recent device check    He will have an echo in October with his cardiologist near [place] and I will see him at his next annual with me.      Kimberly Maynard MD   of Medicine   [employer]   [place] [facility] of Cardiology         CC  Patient Care Team:  Leidy Everett MD as PCP - General (Family Medicine)  Caesar, Kimberly Ng MD as MD (Cardiology)  Edel Hernandez APRN CNP as Nurse Practitioner (Nurse Practitioner - Gerontology)  Kristen Obrien, ALETA as Specialty Care Coordinator (Cardiology)  Isabell Steel, RN as Registered Nurse (Cardiology)  Edel Hernandez APRN CNP as Assigned Heart and Vascular Provider  EDEL HERNANDEZ

## 2023-04-03 NOTE — NURSING NOTE
Diet: Patient instructed regarding a heart failure healthy diet, including discussion of reduced fat and 2000 mg daily sodium restriction, daily weights, medication purpose and compliance, fluid restrictions and resources for patient and family to access for assistance with heart failure management.       Labs: Patient was given results of the laboratory testing obtained today and patient was instructed about when to return for the next laboratory testing.     Med Reconcile: Reviewed and verified all current medications with the patient. The updated medication list was printed and given to the patient.     Med changes: none    Return Appointment: Patient given instructions regarding scheduling next clinic visit: Echo in October, and follow up with Dr Lewis in a year    Patient stated he understood all health information given and agreed to call with further questions or concerns.     Isabell Steel RN

## 2023-04-03 NOTE — NURSING NOTE
Chief Complaint   Patient presents with     Labs Only     Blood drawn via VPT by LPN.      ARIE Chapa LPN

## 2023-04-07 DIAGNOSIS — E78.5 HYPERLIPIDEMIA: ICD-10-CM

## 2023-04-07 DIAGNOSIS — I50.22 CHRONIC SYSTOLIC CONGESTIVE HEART FAILURE (H): ICD-10-CM

## 2023-04-11 RX ORDER — ROSUVASTATIN CALCIUM 40 MG/1
40 TABLET, COATED ORAL DAILY
Qty: 90 TABLET | Refills: 3 | Status: SHIPPED | OUTPATIENT
Start: 2023-04-11 | End: 2024-05-23

## 2023-04-11 RX ORDER — CARVEDILOL 12.5 MG/1
TABLET ORAL
Qty: 270 TABLET | Refills: 3 | Status: SHIPPED | OUTPATIENT
Start: 2023-04-11

## 2023-04-11 NOTE — TELEPHONE ENCOUNTER
Last Clinic Visit: 4/3/2023  [facility]  Lipids done outside  In care everywhere on 1/25/23

## 2023-04-30 ENCOUNTER — HEALTH MAINTENANCE LETTER (OUTPATIENT)
Age: 68
End: 2023-04-30

## 2023-05-24 ENCOUNTER — TRANSFERRED RECORDS (OUTPATIENT)
Dept: HEALTH INFORMATION MANAGEMENT | Facility: CLINIC | Age: 68
End: 2023-05-24

## 2023-05-30 NOTE — PROGRESS NOTES
Called Matt. He states he received testing results yesterday. His entresto is  so I sent him a new rx through for him and he dede  at his regular pharmacy. He also states he needs refill for crestor so this was sent through for him as well. Reviewed instructions for starting entresto and he states understanding. I will plan to call him in a couple weeks to check on him. He has no further questions.    Mauc Instructions: By selecting yes to the question below the MAUC number will be added into the note.  This will be calculated automatically based on the diagnosis chosen, the size entered, the body zone selected (H,M,L) and the specific indications you chose. You will also have the option to override the Mohs AUC if you disagree with the automatically calculated number and this option is found in the Case Summary tab.

## 2023-06-09 ENCOUNTER — ANCILLARY PROCEDURE (OUTPATIENT)
Dept: CARDIOLOGY | Facility: CLINIC | Age: 68
End: 2023-06-09
Attending: INTERNAL MEDICINE
Payer: MEDICARE

## 2023-06-09 DIAGNOSIS — I25.5 ISCHEMIC CARDIOMYOPATHY: ICD-10-CM

## 2023-06-09 PROCEDURE — 93295 DEV INTERROG REMOTE 1/2/MLT: CPT | Performed by: INTERNAL MEDICINE

## 2023-06-09 PROCEDURE — 93296 REM INTERROG EVL PM/IDS: CPT

## 2023-06-12 ENCOUNTER — PATIENT OUTREACH (OUTPATIENT)
Dept: CARDIOLOGY | Facility: CLINIC | Age: 68
End: 2023-06-12
Payer: COMMERCIAL

## 2023-06-12 NOTE — TELEPHONE ENCOUNTER
Dr Caesar Day was reviewing your chart and noted that you may benefit from a baby aspirin.  However, she did want me to verify with you, are you taking a full aspirin (325) daily?    Isabell ROMAN RN   Cardiology Care Coordinator - Heart Failure/ [facility]  [facility]

## 2023-06-13 LAB
MDC_IDC_EPISODE_DTM: NORMAL
MDC_IDC_EPISODE_DURATION: 15 S
MDC_IDC_EPISODE_ID: NORMAL
MDC_IDC_EPISODE_TYPE: NORMAL
MDC_IDC_EPISODE_VENDOR_TYPE: NORMAL
MDC_IDC_LEAD_IMPLANT_DT: NORMAL
MDC_IDC_LEAD_LOCATION: NORMAL
MDC_IDC_LEAD_LOCATION_DETAIL_1: NORMAL
MDC_IDC_LEAD_MFG: NORMAL
MDC_IDC_LEAD_MODEL: NORMAL
MDC_IDC_LEAD_POLARITY_TYPE: NORMAL
MDC_IDC_LEAD_SERIAL: NORMAL
MDC_IDC_MSMT_BATTERY_DTM: NORMAL
MDC_IDC_MSMT_BATTERY_REMAINING_LONGEVITY: 18 MO
MDC_IDC_MSMT_BATTERY_REMAINING_PERCENTAGE: 17 %
MDC_IDC_MSMT_BATTERY_STATUS: NORMAL
MDC_IDC_MSMT_CAP_CHARGE_DTM: NORMAL
MDC_IDC_MSMT_CAP_CHARGE_DTM: NORMAL
MDC_IDC_MSMT_CAP_CHARGE_ENERGY: 1 J
MDC_IDC_MSMT_CAP_CHARGE_TIME: 0.3 S
MDC_IDC_MSMT_CAP_CHARGE_TIME: 11.3 S
MDC_IDC_MSMT_CAP_CHARGE_TYPE: NORMAL
MDC_IDC_MSMT_CAP_CHARGE_TYPE: NORMAL
MDC_IDC_MSMT_LEADCHNL_RV_IMPEDANCE_VALUE: 646 OHM
MDC_IDC_PG_IMPLANT_DTM: NORMAL
MDC_IDC_PG_MFG: NORMAL
MDC_IDC_PG_MODEL: NORMAL
MDC_IDC_PG_SERIAL: NORMAL
MDC_IDC_PG_TYPE: NORMAL
MDC_IDC_SESS_CLINIC_NAME: NORMAL
MDC_IDC_SESS_DTM: NORMAL
MDC_IDC_SESS_TYPE: NORMAL
MDC_IDC_SET_BRADY_LOWRATE: 40 {BEATS}/MIN
MDC_IDC_SET_BRADY_MODE: NORMAL
MDC_IDC_SET_LEADCHNL_RV_PACING_AMPLITUDE: 2.4 V
MDC_IDC_SET_LEADCHNL_RV_PACING_POLARITY: NORMAL
MDC_IDC_SET_LEADCHNL_RV_PACING_PULSEWIDTH: 0.5 MS
MDC_IDC_SET_LEADCHNL_RV_SENSING_ADAPTATION_MODE: NORMAL
MDC_IDC_SET_LEADCHNL_RV_SENSING_POLARITY: NORMAL
MDC_IDC_SET_LEADCHNL_RV_SENSING_SENSITIVITY: 0.6 MV
MDC_IDC_SET_ZONE_DETECTION_INTERVAL: 273 MS
MDC_IDC_SET_ZONE_DETECTION_INTERVAL: 333 MS
MDC_IDC_SET_ZONE_TYPE: NORMAL
MDC_IDC_SET_ZONE_TYPE: NORMAL
MDC_IDC_SET_ZONE_VENDOR_TYPE: NORMAL
MDC_IDC_SET_ZONE_VENDOR_TYPE: NORMAL
MDC_IDC_STAT_BRADY_DTM_END: NORMAL
MDC_IDC_STAT_BRADY_DTM_START: NORMAL
MDC_IDC_STAT_BRADY_RV_PERCENT_PACED: 0 %
MDC_IDC_STAT_EPISODE_RECENT_COUNT: 0
MDC_IDC_STAT_EPISODE_RECENT_COUNT: 1
MDC_IDC_STAT_EPISODE_RECENT_COUNT_DTM_END: NORMAL
MDC_IDC_STAT_EPISODE_RECENT_COUNT_DTM_START: NORMAL
MDC_IDC_STAT_EPISODE_TYPE: NORMAL
MDC_IDC_STAT_EPISODE_VENDOR_TYPE: NORMAL
MDC_IDC_STAT_TACHYTHERAPY_ATP_DELIVERED_RECENT: 0
MDC_IDC_STAT_TACHYTHERAPY_ATP_DELIVERED_TOTAL: 0
MDC_IDC_STAT_TACHYTHERAPY_RECENT_DTM_END: NORMAL
MDC_IDC_STAT_TACHYTHERAPY_RECENT_DTM_START: NORMAL
MDC_IDC_STAT_TACHYTHERAPY_SHOCKS_ABORTED_RECENT: 0
MDC_IDC_STAT_TACHYTHERAPY_SHOCKS_ABORTED_TOTAL: 0
MDC_IDC_STAT_TACHYTHERAPY_SHOCKS_DELIVERED_RECENT: 0
MDC_IDC_STAT_TACHYTHERAPY_SHOCKS_DELIVERED_TOTAL: 1
MDC_IDC_STAT_TACHYTHERAPY_TOTAL_DTM_END: NORMAL
MDC_IDC_STAT_TACHYTHERAPY_TOTAL_DTM_START: NORMAL

## 2023-06-15 RX ORDER — ASPIRIN 325 MG
162 TABLET ORAL DAILY
Qty: 45 TABLET | Refills: 3 | COMMUNITY
Start: 2023-06-15

## 2023-06-15 NOTE — PROGRESS NOTES
"Called Matt to follow up on if he is actually taking Asprin or not, chart review suggests that he is taking 365 mg daily.  Verified with Matt that he take a 182 of aspirin daily.      He also stated that he has \"clots in the left side of his heart\" and is wondering if it is ok to fly to [place] for the [facility] conference.    "

## 2023-06-19 NOTE — TELEPHONE ENCOUNTER
Updated Epic that Nitish is taking Aspirin 162 mg daily and updated Nitish that Dr Maynard had no objection to him flying to [place].

## 2023-09-12 ENCOUNTER — ANCILLARY PROCEDURE (OUTPATIENT)
Dept: CARDIOLOGY | Facility: CLINIC | Age: 68
End: 2023-09-12
Attending: INTERNAL MEDICINE
Payer: MEDICARE

## 2023-09-12 DIAGNOSIS — I25.5 ISCHEMIC CARDIOMYOPATHY: ICD-10-CM

## 2023-09-12 PROCEDURE — 93296 REM INTERROG EVL PM/IDS: CPT

## 2023-09-12 PROCEDURE — 93295 DEV INTERROG REMOTE 1/2/MLT: CPT | Performed by: INTERNAL MEDICINE

## 2023-12-14 ENCOUNTER — ANCILLARY PROCEDURE (OUTPATIENT)
Dept: CARDIOLOGY | Facility: CLINIC | Age: 68
End: 2023-12-14
Attending: INTERNAL MEDICINE
Payer: MEDICARE

## 2023-12-14 DIAGNOSIS — I25.5 ISCHEMIC CARDIOMYOPATHY: ICD-10-CM

## 2023-12-14 PROCEDURE — 93296 REM INTERROG EVL PM/IDS: CPT

## 2023-12-14 PROCEDURE — 93295 DEV INTERROG REMOTE 1/2/MLT: CPT | Performed by: INTERNAL MEDICINE

## 2024-01-08 LAB
MDC_IDC_EPISODE_DTM: NORMAL
MDC_IDC_EPISODE_DTM: NORMAL
MDC_IDC_EPISODE_DURATION: 7 S
MDC_IDC_EPISODE_DURATION: 8 S
MDC_IDC_EPISODE_ID: NORMAL
MDC_IDC_EPISODE_ID: NORMAL
MDC_IDC_EPISODE_TYPE: NORMAL
MDC_IDC_EPISODE_TYPE: NORMAL
MDC_IDC_EPISODE_TYPE_INDUCED: NO
MDC_IDC_EPISODE_TYPE_INDUCED: NO
MDC_IDC_LEAD_CONNECTION_STATUS: NORMAL
MDC_IDC_LEAD_IMPLANT_DT: NORMAL
MDC_IDC_LEAD_LOCATION: NORMAL
MDC_IDC_LEAD_LOCATION_DETAIL_1: NORMAL
MDC_IDC_LEAD_MFG: NORMAL
MDC_IDC_LEAD_MODEL: NORMAL
MDC_IDC_LEAD_POLARITY_TYPE: NORMAL
MDC_IDC_LEAD_SERIAL: NORMAL
MDC_IDC_MSMT_BATTERY_DTM: NORMAL
MDC_IDC_MSMT_BATTERY_REMAINING_LONGEVITY: 12 MO
MDC_IDC_MSMT_BATTERY_REMAINING_PERCENTAGE: 13 %
MDC_IDC_MSMT_BATTERY_STATUS: NORMAL
MDC_IDC_MSMT_CAP_CHARGE_DTM: NORMAL
MDC_IDC_MSMT_CAP_CHARGE_DTM: NORMAL
MDC_IDC_MSMT_CAP_CHARGE_ENERGY: 1 J
MDC_IDC_MSMT_CAP_CHARGE_TIME: 0.3 S
MDC_IDC_MSMT_CAP_CHARGE_TIME: 11.7 S
MDC_IDC_MSMT_CAP_CHARGE_TYPE: NORMAL
MDC_IDC_MSMT_CAP_CHARGE_TYPE: NORMAL
MDC_IDC_MSMT_LEADCHNL_RV_IMPEDANCE_VALUE: 652 OHM
MDC_IDC_MSMT_LEADCHNL_RV_PACING_THRESHOLD_AMPLITUDE: 1 V
MDC_IDC_MSMT_LEADCHNL_RV_PACING_THRESHOLD_PULSEWIDTH: 0.5 MS
MDC_IDC_PG_IMPLANT_DTM: NORMAL
MDC_IDC_PG_MFG: NORMAL
MDC_IDC_PG_MODEL: NORMAL
MDC_IDC_PG_SERIAL: NORMAL
MDC_IDC_PG_TYPE: NORMAL
MDC_IDC_SESS_CLINIC_NAME: NORMAL
MDC_IDC_SESS_DTM: NORMAL
MDC_IDC_SESS_TYPE: NORMAL
MDC_IDC_SET_BRADY_LOWRATE: 40 {BEATS}/MIN
MDC_IDC_SET_BRADY_MODE: NORMAL
MDC_IDC_SET_LEADCHNL_RV_PACING_AMPLITUDE: 2.4 V
MDC_IDC_SET_LEADCHNL_RV_PACING_POLARITY: NORMAL
MDC_IDC_SET_LEADCHNL_RV_PACING_PULSEWIDTH: 0.5 MS
MDC_IDC_SET_LEADCHNL_RV_SENSING_ADAPTATION_MODE: NORMAL
MDC_IDC_SET_LEADCHNL_RV_SENSING_POLARITY: NORMAL
MDC_IDC_SET_LEADCHNL_RV_SENSING_SENSITIVITY: 0.6 MV
MDC_IDC_SET_ZONE_DETECTION_INTERVAL: 273 MS
MDC_IDC_SET_ZONE_DETECTION_INTERVAL: 333 MS
MDC_IDC_SET_ZONE_STATUS: NORMAL
MDC_IDC_SET_ZONE_STATUS: NORMAL
MDC_IDC_SET_ZONE_TYPE: NORMAL
MDC_IDC_SET_ZONE_TYPE: NORMAL
MDC_IDC_SET_ZONE_VENDOR_TYPE: NORMAL
MDC_IDC_SET_ZONE_VENDOR_TYPE: NORMAL
MDC_IDC_STAT_BRADY_DTM_END: NORMAL
MDC_IDC_STAT_BRADY_DTM_START: NORMAL
MDC_IDC_STAT_BRADY_RV_PERCENT_PACED: 0 %
MDC_IDC_STAT_EPISODE_RECENT_COUNT: 0
MDC_IDC_STAT_EPISODE_RECENT_COUNT: 0
MDC_IDC_STAT_EPISODE_RECENT_COUNT: 1
MDC_IDC_STAT_EPISODE_RECENT_COUNT: 3
MDC_IDC_STAT_EPISODE_RECENT_COUNT_DTM_END: NORMAL
MDC_IDC_STAT_EPISODE_RECENT_COUNT_DTM_START: NORMAL
MDC_IDC_STAT_EPISODE_TYPE: NORMAL
MDC_IDC_STAT_EPISODE_VENDOR_TYPE: NORMAL
MDC_IDC_STAT_TACHYTHERAPY_ATP_DELIVERED_RECENT: 0
MDC_IDC_STAT_TACHYTHERAPY_ATP_DELIVERED_TOTAL: 0
MDC_IDC_STAT_TACHYTHERAPY_RECENT_DTM_END: NORMAL
MDC_IDC_STAT_TACHYTHERAPY_RECENT_DTM_START: NORMAL
MDC_IDC_STAT_TACHYTHERAPY_SHOCKS_ABORTED_RECENT: 0
MDC_IDC_STAT_TACHYTHERAPY_SHOCKS_ABORTED_TOTAL: 0
MDC_IDC_STAT_TACHYTHERAPY_SHOCKS_DELIVERED_RECENT: 0
MDC_IDC_STAT_TACHYTHERAPY_SHOCKS_DELIVERED_TOTAL: 1
MDC_IDC_STAT_TACHYTHERAPY_TOTAL_DTM_END: NORMAL
MDC_IDC_STAT_TACHYTHERAPY_TOTAL_DTM_START: NORMAL

## 2024-01-16 ENCOUNTER — TELEPHONE (OUTPATIENT)
Dept: CARDIOLOGY | Facility: CLINIC | Age: 69
End: 2024-01-16
Payer: COMMERCIAL

## 2024-01-16 NOTE — TELEPHONE ENCOUNTER
Spoke with pt and scheduled f/up with Dr. Maynard, labs and device check Candy Akhil on 1/16/2024 at 12:17 PM

## 2024-02-08 LAB
MDC_IDC_EPISODE_DTM: NORMAL
MDC_IDC_EPISODE_ID: NORMAL
MDC_IDC_EPISODE_TYPE: NORMAL
MDC_IDC_LEAD_IMPLANT_DT: NORMAL
MDC_IDC_LEAD_LOCATION: NORMAL
MDC_IDC_LEAD_LOCATION_DETAIL_1: NORMAL
MDC_IDC_LEAD_MFG: NORMAL
MDC_IDC_LEAD_MODEL: NORMAL
MDC_IDC_LEAD_POLARITY_TYPE: NORMAL
MDC_IDC_LEAD_SERIAL: NORMAL
MDC_IDC_MSMT_BATTERY_DTM: NORMAL
MDC_IDC_MSMT_BATTERY_REMAINING_LONGEVITY: 12 MO
MDC_IDC_MSMT_BATTERY_REMAINING_PERCENTAGE: 16 %
MDC_IDC_MSMT_BATTERY_STATUS: NORMAL
MDC_IDC_MSMT_CAP_CHARGE_DTM: NORMAL
MDC_IDC_MSMT_CAP_CHARGE_DTM: NORMAL
MDC_IDC_MSMT_CAP_CHARGE_ENERGY: 1 J
MDC_IDC_MSMT_CAP_CHARGE_TIME: 0.3 S
MDC_IDC_MSMT_CAP_CHARGE_TIME: 11.5 S
MDC_IDC_MSMT_CAP_CHARGE_TYPE: NORMAL
MDC_IDC_MSMT_CAP_CHARGE_TYPE: NORMAL
MDC_IDC_MSMT_LEADCHNL_RV_IMPEDANCE_VALUE: 647 OHM
MDC_IDC_PG_IMPLANT_DTM: NORMAL
MDC_IDC_PG_MFG: NORMAL
MDC_IDC_PG_MODEL: NORMAL
MDC_IDC_PG_SERIAL: NORMAL
MDC_IDC_PG_TYPE: NORMAL
MDC_IDC_SESS_CLINIC_NAME: NORMAL
MDC_IDC_SESS_DTM: NORMAL
MDC_IDC_SESS_TYPE: NORMAL
MDC_IDC_SET_BRADY_LOWRATE: 40 {BEATS}/MIN
MDC_IDC_SET_BRADY_MODE: NORMAL
MDC_IDC_SET_LEADCHNL_RV_PACING_AMPLITUDE: 2.4 V
MDC_IDC_SET_LEADCHNL_RV_PACING_POLARITY: NORMAL
MDC_IDC_SET_LEADCHNL_RV_PACING_PULSEWIDTH: 0.5 MS
MDC_IDC_SET_LEADCHNL_RV_SENSING_ADAPTATION_MODE: NORMAL
MDC_IDC_SET_LEADCHNL_RV_SENSING_POLARITY: NORMAL
MDC_IDC_SET_LEADCHNL_RV_SENSING_SENSITIVITY: 0.6 MV
MDC_IDC_SET_ZONE_DETECTION_INTERVAL: 273 MS
MDC_IDC_SET_ZONE_DETECTION_INTERVAL: 333 MS
MDC_IDC_SET_ZONE_TYPE: NORMAL
MDC_IDC_SET_ZONE_TYPE: NORMAL
MDC_IDC_SET_ZONE_VENDOR_TYPE: NORMAL
MDC_IDC_SET_ZONE_VENDOR_TYPE: NORMAL
MDC_IDC_STAT_BRADY_DTM_END: NORMAL
MDC_IDC_STAT_BRADY_DTM_START: NORMAL
MDC_IDC_STAT_BRADY_RV_PERCENT_PACED: 0 %
MDC_IDC_STAT_EPISODE_RECENT_COUNT: 0
MDC_IDC_STAT_EPISODE_RECENT_COUNT: 1
MDC_IDC_STAT_EPISODE_RECENT_COUNT_DTM_END: NORMAL
MDC_IDC_STAT_EPISODE_RECENT_COUNT_DTM_START: NORMAL
MDC_IDC_STAT_EPISODE_TYPE: NORMAL
MDC_IDC_STAT_EPISODE_VENDOR_TYPE: NORMAL
MDC_IDC_STAT_TACHYTHERAPY_ATP_DELIVERED_RECENT: 0
MDC_IDC_STAT_TACHYTHERAPY_ATP_DELIVERED_TOTAL: 0
MDC_IDC_STAT_TACHYTHERAPY_RECENT_DTM_END: NORMAL
MDC_IDC_STAT_TACHYTHERAPY_RECENT_DTM_START: NORMAL
MDC_IDC_STAT_TACHYTHERAPY_SHOCKS_ABORTED_RECENT: 0
MDC_IDC_STAT_TACHYTHERAPY_SHOCKS_ABORTED_TOTAL: 0
MDC_IDC_STAT_TACHYTHERAPY_SHOCKS_DELIVERED_RECENT: 0
MDC_IDC_STAT_TACHYTHERAPY_SHOCKS_DELIVERED_TOTAL: 1
MDC_IDC_STAT_TACHYTHERAPY_TOTAL_DTM_END: NORMAL
MDC_IDC_STAT_TACHYTHERAPY_TOTAL_DTM_START: NORMAL

## 2024-03-25 DIAGNOSIS — I50.22 CHRONIC SYSTOLIC CONGESTIVE HEART FAILURE (H): Primary | ICD-10-CM

## 2024-03-28 ENCOUNTER — OFFICE VISIT (OUTPATIENT)
Dept: CARDIOLOGY | Facility: CLINIC | Age: 69
End: 2024-03-28
Attending: INTERNAL MEDICINE
Payer: COMMERCIAL

## 2024-03-28 ENCOUNTER — LAB (OUTPATIENT)
Dept: LAB | Facility: CLINIC | Age: 69
End: 2024-03-28
Attending: INTERNAL MEDICINE
Payer: COMMERCIAL

## 2024-03-28 VITALS
OXYGEN SATURATION: 96 % | WEIGHT: 153 LBS | BODY MASS INDEX: 23.96 KG/M2 | SYSTOLIC BLOOD PRESSURE: 135 MMHG | HEART RATE: 72 BPM | DIASTOLIC BLOOD PRESSURE: 90 MMHG

## 2024-03-28 DIAGNOSIS — I50.22 CHRONIC SYSTOLIC CONGESTIVE HEART FAILURE (H): ICD-10-CM

## 2024-03-28 DIAGNOSIS — I25.5 ISCHEMIC CARDIOMYOPATHY: ICD-10-CM

## 2024-03-28 LAB
ANION GAP SERPL CALCULATED.3IONS-SCNC: 8 MMOL/L (ref 7–15)
BUN SERPL-MCNC: 9.9 MG/DL (ref 8–23)
CALCIUM SERPL-MCNC: 9.2 MG/DL (ref 8.8–10.2)
CHLORIDE SERPL-SCNC: 100 MMOL/L (ref 98–107)
CREAT SERPL-MCNC: 0.84 MG/DL (ref 0.67–1.17)
DEPRECATED HCO3 PLAS-SCNC: 29 MMOL/L (ref 22–29)
EGFRCR SERPLBLD CKD-EPI 2021: >90 ML/MIN/1.73M2
GLUCOSE SERPL-MCNC: 106 MG/DL (ref 70–99)
MDC_IDC_EPISODE_DTM: NORMAL
MDC_IDC_EPISODE_ID: NORMAL
MDC_IDC_EPISODE_TYPE: NORMAL
MDC_IDC_LEAD_CONNECTION_STATUS: NORMAL
MDC_IDC_LEAD_IMPLANT_DT: NORMAL
MDC_IDC_LEAD_LOCATION: NORMAL
MDC_IDC_LEAD_LOCATION_DETAIL_1: NORMAL
MDC_IDC_LEAD_MFG: NORMAL
MDC_IDC_LEAD_MODEL: NORMAL
MDC_IDC_LEAD_POLARITY_TYPE: NORMAL
MDC_IDC_LEAD_SERIAL: NORMAL
MDC_IDC_MSMT_BATTERY_DTM: NORMAL
MDC_IDC_MSMT_BATTERY_REMAINING_LONGEVITY: 9 MO
MDC_IDC_MSMT_BATTERY_REMAINING_PERCENTAGE: 10 %
MDC_IDC_MSMT_BATTERY_STATUS: NORMAL
MDC_IDC_MSMT_CAP_CHARGE_DTM: NORMAL
MDC_IDC_MSMT_CAP_CHARGE_DTM: NORMAL
MDC_IDC_MSMT_CAP_CHARGE_ENERGY: 1 J
MDC_IDC_MSMT_CAP_CHARGE_TIME: 0.3 S
MDC_IDC_MSMT_CAP_CHARGE_TIME: 12.1 S
MDC_IDC_MSMT_CAP_CHARGE_TYPE: NORMAL
MDC_IDC_MSMT_CAP_CHARGE_TYPE: NORMAL
MDC_IDC_MSMT_LEADCHNL_RV_IMPEDANCE_VALUE: 697 OHM
MDC_IDC_MSMT_LEADCHNL_RV_PACING_THRESHOLD_AMPLITUDE: 0.8 V
MDC_IDC_MSMT_LEADCHNL_RV_PACING_THRESHOLD_PULSEWIDTH: 0.5 MS
MDC_IDC_PG_IMPLANT_DTM: NORMAL
MDC_IDC_PG_MFG: NORMAL
MDC_IDC_PG_MODEL: NORMAL
MDC_IDC_PG_SERIAL: NORMAL
MDC_IDC_PG_TYPE: NORMAL
MDC_IDC_SESS_CLINIC_NAME: NORMAL
MDC_IDC_SESS_DTM: NORMAL
MDC_IDC_SESS_TYPE: NORMAL
MDC_IDC_SET_BRADY_LOWRATE: 40 {BEATS}/MIN
MDC_IDC_SET_BRADY_MODE: NORMAL
MDC_IDC_SET_LEADCHNL_RV_PACING_AMPLITUDE: 2.4 V
MDC_IDC_SET_LEADCHNL_RV_PACING_POLARITY: NORMAL
MDC_IDC_SET_LEADCHNL_RV_PACING_PULSEWIDTH: 0.5 MS
MDC_IDC_SET_LEADCHNL_RV_SENSING_ADAPTATION_MODE: NORMAL
MDC_IDC_SET_LEADCHNL_RV_SENSING_POLARITY: NORMAL
MDC_IDC_SET_LEADCHNL_RV_SENSING_SENSITIVITY: 0.6 MV
MDC_IDC_SET_ZONE_DETECTION_INTERVAL: 273 MS
MDC_IDC_SET_ZONE_DETECTION_INTERVAL: 333 MS
MDC_IDC_SET_ZONE_STATUS: NORMAL
MDC_IDC_SET_ZONE_STATUS: NORMAL
MDC_IDC_SET_ZONE_TYPE: NORMAL
MDC_IDC_SET_ZONE_TYPE: NORMAL
MDC_IDC_SET_ZONE_VENDOR_TYPE: NORMAL
MDC_IDC_SET_ZONE_VENDOR_TYPE: NORMAL
MDC_IDC_STAT_BRADY_DTM_END: NORMAL
MDC_IDC_STAT_BRADY_DTM_START: NORMAL
MDC_IDC_STAT_BRADY_RV_PERCENT_PACED: 0 %
MDC_IDC_STAT_EPISODE_RECENT_COUNT: 0
MDC_IDC_STAT_EPISODE_RECENT_COUNT: 0
MDC_IDC_STAT_EPISODE_RECENT_COUNT: 1
MDC_IDC_STAT_EPISODE_RECENT_COUNT: 3
MDC_IDC_STAT_EPISODE_RECENT_COUNT_DTM_END: NORMAL
MDC_IDC_STAT_EPISODE_RECENT_COUNT_DTM_START: NORMAL
MDC_IDC_STAT_EPISODE_TYPE: NORMAL
MDC_IDC_STAT_EPISODE_VENDOR_TYPE: NORMAL
MDC_IDC_STAT_TACHYTHERAPY_ATP_DELIVERED_RECENT: 0
MDC_IDC_STAT_TACHYTHERAPY_ATP_DELIVERED_TOTAL: 0
MDC_IDC_STAT_TACHYTHERAPY_RECENT_DTM_END: NORMAL
MDC_IDC_STAT_TACHYTHERAPY_RECENT_DTM_START: NORMAL
MDC_IDC_STAT_TACHYTHERAPY_SHOCKS_ABORTED_RECENT: 0
MDC_IDC_STAT_TACHYTHERAPY_SHOCKS_ABORTED_TOTAL: 0
MDC_IDC_STAT_TACHYTHERAPY_SHOCKS_DELIVERED_RECENT: 0
MDC_IDC_STAT_TACHYTHERAPY_SHOCKS_DELIVERED_TOTAL: 1
MDC_IDC_STAT_TACHYTHERAPY_TOTAL_DTM_END: NORMAL
MDC_IDC_STAT_TACHYTHERAPY_TOTAL_DTM_START: NORMAL
NT-PROBNP SERPL-MCNC: 466 PG/ML (ref 0–900)
POTASSIUM SERPL-SCNC: 4.3 MMOL/L (ref 3.4–5.3)
SODIUM SERPL-SCNC: 137 MMOL/L (ref 135–145)

## 2024-03-28 PROCEDURE — 80048 BASIC METABOLIC PNL TOTAL CA: CPT | Performed by: PATHOLOGY

## 2024-03-28 PROCEDURE — 99214 OFFICE O/P EST MOD 30 MIN: CPT | Mod: 25 | Performed by: INTERNAL MEDICINE

## 2024-03-28 PROCEDURE — 93282 PRGRMG EVAL IMPLANTABLE DFB: CPT | Performed by: INTERNAL MEDICINE

## 2024-03-28 PROCEDURE — 36415 COLL VENOUS BLD VENIPUNCTURE: CPT | Performed by: PATHOLOGY

## 2024-03-28 PROCEDURE — G0463 HOSPITAL OUTPT CLINIC VISIT: HCPCS | Performed by: INTERNAL MEDICINE

## 2024-03-28 PROCEDURE — 83880 ASSAY OF NATRIURETIC PEPTIDE: CPT | Performed by: PATHOLOGY

## 2024-03-28 RX ORDER — WARFARIN SODIUM 2 MG/1
TABLET ORAL
COMMUNITY

## 2024-03-28 RX ORDER — ASPIRIN 81 MG/1
81 TABLET ORAL DAILY
COMMUNITY

## 2024-03-28 ASSESSMENT — PAIN SCALES - GENERAL: PAINLEVEL: NO PAIN (0)

## 2024-03-28 NOTE — NURSING NOTE
Diet: Patient instructed regarding a heart failure healthy diet, including discussion of reduced fat and 2000 mg daily sodium restriction, daily weights, medication purpose and compliance, fluid restrictions and resources for patient and family to access for assistance with heart failure management.       Labs: Patient was given results of the laboratory testing obtained today and patient was instructed about when to return for the next laboratory testing.     Med Reconcile: Reviewed and verified all current medications with the patient. The updated medication list was printed and given to the patient.     Med changes: no changes     Return Appointment: Patient given instructions regarding scheduling next clinic visit: Follow up with Dr Maynard in a year.  EP team will reach out about generator change    Patient stated he understood all health information given and agreed to call with further questions or concerns.     Isabell Steel RN

## 2024-03-28 NOTE — PROGRESS NOTES
[facility]      March 28, 2024       Dear Colleagues:        We had the pleasure of seeing Mr. Matt Hester in the [facility] today.    long-standing history of ischemic cardiomyopathy     Cardiac history:     His first MI was in 1995.  In 2003, he had an anterior wall MI requiring stenting.  He had a single-chamber ICD implanted in 03/2004 for primary prevention and a generator change on 01/2010 and again in 06/2011.  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  He has regained function of his left arm but his visual deficit persists.    Overall he has had very long-term stability in his cardiac function with no functional limitation and no diuretic requirement.   We had tried to put him on Entresto previously and he did not tolerate it.        Major health updates:   He did have COVID in the fall in the setting of having COVID-19 he was hospitalized for several weeks and did have what was thought to be a new stroke.  He remains on Coumadin long-term for history of LV thrombus and history of stroke without recovery in LV function.   Have a workup for this showing no carotid stenoses and no cerebral aneurysms requiring any further intervention.   He has no residual deficits from this most recent stroke and overall feels he is made a full recovery-the only residual deficit is some sensations of vertigo.     About a year ago he had a recurrent LV thrombus for which we put him on Coumadin and aspirin along for 6 months.     This visit cardiac symptoms:   stable symptoms from the time of our last visit.   Can walk several blocks without difficulty.   No PND/orthopnea, edema   No chest pain   No admissions for HF.  BPs low in the am: 90s over 50s    He is presently on beta-blocker spironolactone and enalapril and tolerating these well.  He  continues to not need any diuretics.      PAST MEDICAL HISTORY:  Past Medical History   Diagnosis Date    Chronic systolic congestive heart failure (H)     Ischemic cardiomyopathy     ICD (implantable cardioverter-defibrillator) in place     HTN (hypertension)     Hyperlipidemia     Peripheral vascular disease (H)     CVA (cerebral vascular accident) (H)     Atrial fibrillation (H), paroxysmal     TIA     Other medical history includes   - colonic polyp in 11/2010   - atrophic gastritis  -  Haro esophagus   - benign neoplasm of the colon in 2011   - chronic abdominal pain  -  diverticulosis, bile duct hematoma per liver biopsy,  - colectomy 01/05/2011  - a stroke in 10/2011   - right occipital residual vision changes  - Hypertension   - nasal polyp   - ICD placement in 06/2011 for primary prevention.      ALLERGIES:     1.  Aggrenox, severe nausea.   2.  Alcohol, hives.   3.  Effient, rash and itching.   4.  Demerol, itching, nausea and vomiting.   5.  Flagyl.   6.  Levaquin.     Current Outpatient Medications   Medication Sig Dispense Refill    aspirin 81 MG EC tablet Take 81 mg by mouth daily      carvedilol (COREG) 12.5 MG tablet TAKE 1 1/2 TABLETS BY MOUTH TWO TIMES PER  tablet 3    enalapril (VASOTEC) 5 MG tablet Take 1 tablet (5 mg) by mouth 2 times daily 180 tablet 1    ezetimibe (ZETIA) 10 MG tablet Take 1 tablet by mouth daily at 2 pm      potassium chloride ER (KLOR-CON M) 10 MEQ CR tablet Take 10 mEq by mouth once a week Take 1 tablet by mouth three times per week.      rosuvastatin (CRESTOR) 40 MG tablet Take 1 tablet (40 mg) by mouth daily 90 tablet 3    SPIRONOLACTONE PO Take 12.5 mg by mouth daily      warfarin ANTICOAGULANT (COUMADIN) 2 MG tablet TAKE 1.5 TABLETS TUES THURS AND SAT AND 2 TABLETS REST OF WEEK OR AS DIRECTED BY WARFARIN CLINIC.      aspirin (ASA) 325 MG tablet Take 0.5 tablets (162 mg) by mouth daily (Patient not taking: Reported on 3/28/2024) 45 tablet 3     Coumadin      SOCIAL HISTORY:  The patient works full-time in the construction business.  He does not drink, smoke or use drugs, and he never has.  [place].  He has 1 son and a grandson that he is very close with and now another grand daughter      All relevant ROS were reviewed in the HPI.     EXAM:  BP (!) 135/90 (BP Location: Right arm, Patient Position: Chair, Cuff Size: Adult Regular)   Pulse 72   Wt 69.4 kg (153 lb)   SpO2 96%   BMI 23.96 kg/m    General: appears comfortable, alert and articulate  Head: normocephalic, atraumatic  Eyes: anicteric sclera, EOMI  Neck: no adenopathy  Orophyarynx: moist mucosa, no lesions, dentition intact  Heart: regular, S1/S2, no murmur, gallop, rub, estimated JVP  <8 without HJR  Lungs: clear, no rales or wheezing  Abdomen: soft, non-tender, bowel sounds present, no hepatosplenomegaly  Extremities: no clubbing, cyanosis or edema- thin thighs   Neurological: normal speech and affect, no gross motor deficits      Last angiogram:   1. Widely patent stents in the ramus intermedius and LAD.   2. Two-vessel coronary artery disease.  3. Moderate left ventricular chamber enlargement.   4. Left ventricular systolic function is severely reduced with a visually estimated ejection fraction of 15 to 20% and global hypokinesis.   5. Anterior apical and inferior akinesis.   6. Possible left ventricular thrombus.       All lab trends, imaging, recent echos personally reviewed with the patient.          Latest Reference Range & Units 03/28/24 10:38   Sodium 135 - 145 mmol/L 137   Potassium 3.4 - 5.3 mmol/L 4.3   Chloride 98 - 107 mmol/L 100   Carbon Dioxide (CO2) 22 - 29 mmol/L 29   Urea Nitrogen 8.0 - 23.0 mg/dL 9.9   Creatinine 0.67 - 1.17 mg/dL 0.84   GFR Estimate >60 mL/min/1.73m2 >90   Calcium 8.8 - 10.2 mg/dL 9.2   Anion Gap 7 - 15 mmol/L 8   Glucose 70 - 99 mg/dL 106 (H)   N-Terminal Pro Bnp 0 - 900 pg/mL 466   (H): Data is abnormally high      Patient seen in  clinic for evaluation and iterative programming of their ICD per MD orders.      Device: [employer] E102 TELIGEN 100  Normal device function.  Mode: VVI 40 bpm  : <1%  Intrinsic rhythm: VS 74 bpm  Lead Trends Appear Stable  Estimated battery longevity to RRT = 9 months.   Ventricular Arrhythmia: 3 NSVT episodes recorded lasting 2-5 sec at 184-201 bpm.  Setting Changes: None  Patient has an appointment to see Dr. Maynard today.   Plan: Device follow-up every 3 months. Message sent to        Echo 6/23     Technically difficult study.   The left ventricular systolic function is severely reduced.   There are regional wall motion abnormalities as specified.   The left ventricular diastolic function is moderately abnormal (Grade II, pseudonormal) with elevated filling pressures.   Mild tricuspid regurgitation.   Doppler findings do not suggest pulmonary hypertension.   There is no pericardial effusion.   Catheter/pacemaker lead noted within the right ventricle.   Compared to echocardiogram dated 10/3/22, there has been resolution of pericardial effusion without other significant change.       10/02/2022     IMPRESSION:   1. Normal head CTA without evidence of cutoff sign or aneurysm. Satisfactory arborization involving the distal branches of the bilateral middle cerebral arteries.     2. Neck CTA: arteries intact without stenosis or other focal lesions such as dissection or pseudoaneurysm. '''    Assessment and Plan:   In summary, this is a very pleasant, 65year-old man with a long-standing history of ischemic cardiomyopathy with an ejection fraction in the 25% range (now 3) who has been stable on medical therapy for many years with an ICD for primary prevention.      Today, he appears euvolemic without evidence of volume overload and has been without any heart failure hospitalizations.  He is NYHA class II, stage C today.      With regard to his HF-he has not tolerated Entresto in the past, he is on enalapril,  beta-blocker spironolactone and requiring no diuretics.  Euvolemic on exam today.  Stable endorgan function.      He would not have qualified for the Farxiga trial given absence of real limitation as well his lack of elevated natruretic peptide's and no diuretic requirement.  Should he worsen in the future that would be my next move    For his CAD; he is on statin,coumadin  not having any angina, BB     Prior CVAs: He had one in [id number] was negative that time he was on aspirin Plavix for period of time and then there was an LV thrombus identified, he has been on Coumadin since then. Last stroke occurred in the setting of covid but now on Coumadin and low dose ASA - minimal residual deficits    prevention of sudden cardiac death, ICD present - no arrhythmias on most recent device check- due for a generator change which he would like to do here     He will have an echo in October with his cardiologist near [place] and I will see him at his next annual with me.      Kimberly Maynard MD   of Medicine   [facility] Division of Cardiology         CC  Patient Care Team:  Leidy Everett MD as PCP - General (Family Medicine)  Kimberly Maynard MD as MD (Cardiology)  Edel Hernandez, APRN CNP as Nurse Practitioner (Nurse Practitioner - Gerontology)  Kristen Obrien, RN as Specialty Care Coordinator (Cardiology)  Isabell Steel, RN as Registered Nurse (Cardiology)  Kimberly Maynard MD as Assigned Heart and Vascular Provider  EDEL HERNANDEZ

## 2024-03-28 NOTE — PATIENT INSTRUCTIONS
"Cardiology Providers you saw during your visit:  Dr. Maynard    Medication changes:   No changes    Follow up:  Follow up in 1 year with labs   EP discussing generator change      Please call if you have :  1. Weight gain of more than 2 pounds in a day or 5 pounds in a week  2. Increased shortness of breath, swelling or bloating  3. Dizziness, lightheadedness   4. Any questions or concerns.       Follow the American Heart Association Diet and Lifestyle recommendations:  Limit saturated fat, trans fat, sodium, red meat, sweets and sugar-sweetened beverages. If you choose to eat red meat, compare labels and select the leanest cuts available.  Aim for at least 150 minutes of moderate physical activity or 75 minutes of vigorous physical activity - or an equal combination of both - each week.      During business hours: [phone], press option # 1 to schedule and leave a message for your care team.        After hours, weekends or holidays: On Call Cardiologist- [phone]   option #4 and ask to speak to the on-call Cardiologist. Inform them you are a CORE/heart failure patient at the [place].      Heart Failure Support Group     dates:    Monday, , 1-2pm     Monday,  , 1-2pm     Monday,  , 1-2pm     Monday, , 1-2pm     Monday, May 6th, 1-2pm     Monday, , 1-2pm     Monday, , 1-2pm     Monday, , 1-2pm     Monday, , 1-2pm     Monday, , 1-2pm     Monday, , 1-2pm     Monday, , 1-2pm     Isabell Steel RN BSN CHFN  Cardiology Care Coordinator - Heart Failure/ C.O.R.E. Clinic  [facility]   Questions and [phone]   First press #1 for the [facility] [facility] and \"To send a message to your care team\"    "

## 2024-03-28 NOTE — LETTER
3/28/2024      RE: Matt Hester  [address]  [place] [facility] [address]       Dear Colleague,    Thank you for the opportunity to participate in the care of your patient, Matt Hester, at the [facility] [place] at [facility]. Please see a copy of my visit note below.    [facility]      March 28, 2024       Dear Colleagues:        We had the pleasure of seeing Mr. Matt Hester in the [facility] today.    long-standing history of ischemic cardiomyopathy     Cardiac history:     His first MI was in 1995.  In 2003, he had an anterior wall MI requiring stenting.  He had a single-chamber ICD implanted in 03/2004 for primary prevention and a generator change on 01/2010 and again in 06/2011.  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  He has regained function of his left arm but his visual deficit persists.    Overall he has had very long-term stability in his cardiac function with no functional limitation and no diuretic requirement.   We had tried to put him on Entresto previously and he did not tolerate it.        Major health updates:   He did have COVID in the fall in the setting of having COVID-19 he was hospitalized for several weeks and did have what was thought to be a new stroke.  He remains on Coumadin long-term for history of LV thrombus and history of stroke without recovery in LV function.   Have a workup for this showing no carotid stenoses and no cerebral aneurysms requiring any further intervention.   He has no residual deficits from this most recent stroke and overall feels he is made a full recovery-the only residual deficit is some sensations of vertigo.     About a year ago he had a recurrent LV thrombus for which we put him on Coumadin and aspirin along  for 6 months.     This visit cardiac symptoms:   stable symptoms from the time of our last visit.   Can walk several blocks without difficulty.   No PND/orthopnea, edema   No chest pain   No admissions for HF.  BPs low in the am: 90s over 50s    He is presently on beta-blocker spironolactone and enalapril and tolerating these well.  He continues to not need any diuretics.      PAST MEDICAL HISTORY:  Past Medical History   Diagnosis Date    Chronic systolic congestive heart failure (H)     Ischemic cardiomyopathy     ICD (implantable cardioverter-defibrillator) in place     HTN (hypertension)     Hyperlipidemia     Peripheral vascular disease (H)     CVA (cerebral vascular accident) (H)     Atrial fibrillation (H), paroxysmal     TIA     Other medical history includes   - colonic polyp in 11/2010   - atrophic gastritis  -  Haro esophagus   - benign neoplasm of the colon in 2011   - chronic abdominal pain  -  diverticulosis, bile duct hematoma per liver biopsy,  - colectomy 01/05/2011  - a stroke in 10/2011   - right occipital residual vision changes  - Hypertension   - nasal polyp   - ICD placement in 06/2011 for primary prevention.      ALLERGIES:     1.  Aggrenox, severe nausea.   2.  Alcohol, hives.   3.  Effient, rash and itching.   4.  Demerol, itching, nausea and vomiting.   5.  Flagyl.   6.  Levaquin.     Current Outpatient Medications   Medication Sig Dispense Refill    aspirin 81 MG EC tablet Take 81 mg by mouth daily      carvedilol (COREG) 12.5 MG tablet TAKE 1 1/2 TABLETS BY MOUTH TWO TIMES PER  tablet 3    enalapril (VASOTEC) 5 MG tablet Take 1 tablet (5 mg) by mouth 2 times daily 180 tablet 1    ezetimibe (ZETIA) 10 MG tablet Take 1 tablet by mouth daily at 2 pm      potassium chloride ER (KLOR-CON M) 10 MEQ CR tablet Take 10 mEq by mouth once a week Take 1 tablet by mouth three times per week.      rosuvastatin (CRESTOR) 40 MG tablet Take 1 tablet (40 mg) by mouth daily 90 tablet 3     SPIRONOLACTONE PO Take 12.5 mg by mouth daily      warfarin ANTICOAGULANT (COUMADIN) 2 MG tablet TAKE 1.5 TABLETS TUES THURS AND SAT AND 2 TABLETS REST OF WEEK OR AS DIRECTED BY WARFARIN CLINIC.      aspirin (ASA) 325 MG tablet Take 0.5 tablets (162 mg) by mouth daily (Patient not taking: Reported on 3/28/2024) 45 tablet 3     Coumadin     SOCIAL HISTORY:  The patient works full-time in the construction business.  He does not drink, smoke or use drugs, and he never has.  [place].  He has 1 son and a grandson that he is very close with and now another grand daughter      All relevant ROS were reviewed in the HPI.     EXAM:  BP (!) 135/90 (BP Location: Right arm, Patient Position: Chair, Cuff Size: Adult Regular)   Pulse 72   Wt 69.4 kg (153 lb)   SpO2 96%   BMI 23.96 kg/m    General: appears comfortable, alert and articulate  Head: normocephalic, atraumatic  Eyes: anicteric sclera, EOMI  Neck: no adenopathy  Orophyarynx: moist mucosa, no lesions, dentition intact  Heart: regular, S1/S2, no murmur, gallop, rub, estimated JVP  <8 without HJR  Lungs: clear, no rales or wheezing  Abdomen: soft, non-tender, bowel sounds present, no hepatosplenomegaly  Extremities: no clubbing, cyanosis or edema- thin thighs   Neurological: normal speech and affect, no gross motor deficits      Last angiogram:   1. Widely patent stents in the ramus intermedius and LAD.   2. Two-vessel coronary artery disease.  3. Moderate left ventricular chamber enlargement.   4. Left ventricular systolic function is severely reduced with a visually estimated ejection fraction of 15 to 20% and global hypokinesis.   5. Anterior apical and inferior akinesis.   6. Possible left ventricular thrombus.       All lab trends, imaging, recent echos personally reviewed with the patient.          Latest Reference Range & Units 03/28/24 10:38   Sodium 135 - 145 mmol/L 137   Potassium 3.4 - 5.3 mmol/L 4.3   Chloride 98 - 107 mmol/L  100   Carbon Dioxide (CO2) 22 - 29 mmol/L 29   Urea Nitrogen 8.0 - 23.0 mg/dL 9.9   Creatinine 0.67 - 1.17 mg/dL 0.84   GFR Estimate >60 mL/min/1.73m2 >90   Calcium 8.8 - 10.2 mg/dL 9.2   Anion Gap 7 - 15 mmol/L 8   Glucose 70 - 99 mg/dL 106 (H)   N-Terminal Pro Bnp 0 - 900 pg/mL 466   (H): Data is abnormally high      Patient seen in clinic for evaluation and iterative programming of their ICD per MD orders.      Device: [employer] E102 TELIGEN 100  Normal device function.  Mode: VVI 40 bpm  : <1%  Intrinsic rhythm: VS 74 bpm  Lead Trends Appear Stable  Estimated battery longevity to RRT = 9 months.   Ventricular Arrhythmia: 3 NSVT episodes recorded lasting 2-5 sec at 184-201 bpm.  Setting Changes: None  Patient has an appointment to see Dr. Maynard today.   Plan: Device follow-up every 3 months. Message sent to        Echo 6/23     Technically difficult study.   The left ventricular systolic function is severely reduced.   There are regional wall motion abnormalities as specified.   The left ventricular diastolic function is moderately abnormal (Grade II, pseudonormal) with elevated filling pressures.   Mild tricuspid regurgitation.   Doppler findings do not suggest pulmonary hypertension.   There is no pericardial effusion.   Catheter/pacemaker lead noted within the right ventricle.   Compared to echocardiogram dated 10/3/22, there has been resolution of pericardial effusion without other significant change.       10/02/2022     IMPRESSION:   1. Normal head CTA without evidence of cutoff sign or aneurysm. Satisfactory arborization involving the distal branches of the bilateral middle cerebral arteries.     2. Neck CTA: arteries intact without stenosis or other focal lesions such as dissection or pseudoaneurysm. '''    Assessment and Plan:   In summary, this is a very pleasant, 65year-old man with a long-standing history of ischemic cardiomyopathy with an ejection fraction in the 25% range (now 3) who  has been stable on medical therapy for many years with an ICD for primary prevention.      Today, he appears euvolemic without evidence of volume overload and has been without any heart failure hospitalizations.  He is NYHA class II, stage C today.      With regard to his HF-he has not tolerated Entresto in the past, he is on enalapril, beta-blocker spironolactone and requiring no diuretics.  Euvolemic on exam today.  Stable endorgan function.      He would not have qualified for the Farxiga trial given absence of real limitation as well his lack of elevated natruretic peptide's and no diuretic requirement.  Should he worsen in the future that would be my next move    For his CAD; he is on statin,coumadin  not having any angina, BB     Prior CVAs: He had one in [id number] was negative that time he was on aspirin Plavix for period of time and then there was an LV thrombus identified, he has been on Coumadin since then. Last stroke occurred in the setting of covid but now on Coumadin and low dose ASA - minimal residual deficits    prevention of sudden cardiac death, ICD present - no arrhythmias on most recent device check- due for a generator change which he would like to do here     He will have an echo in October with his cardiologist near [place] and I will see him at his next annual with me.      Kimberly Maynard MD   of Medicine   [facility] Division of Cardiology         CC  Patient Care Team:  Leidy Everett MD as PCP - General (Family Medicine)

## 2024-03-28 NOTE — NURSING NOTE
Chief Complaint   Patient presents with    Follow Up     Return Heart Failure- presents with Chronic systolic congestive heart failure for follow up with labs and device prior     Vitals were taken and medications reconciled.    Angel Toure, PEGGY  11:56 AM

## 2024-06-19 ENCOUNTER — ANCILLARY PROCEDURE (OUTPATIENT)
Dept: CARDIOLOGY | Facility: CLINIC | Age: 69
End: 2024-06-19
Attending: INTERNAL MEDICINE
Payer: MEDICARE

## 2024-06-19 DIAGNOSIS — I25.5 ISCHEMIC CARDIOMYOPATHY: ICD-10-CM

## 2024-06-19 PROCEDURE — 93295 DEV INTERROG REMOTE 1/2/MLT: CPT | Performed by: INTERNAL MEDICINE

## 2024-06-19 PROCEDURE — 93296 REM INTERROG EVL PM/IDS: CPT

## 2024-06-19 NOTE — TELEPHONE ENCOUNTER
RECORDS RECEIVED FROM:    DATE RECEIVED:    NOTES STATUS DETAILS   OFFICE NOTE from referring provider  Internal    OFFICE NOTE from other cardiologists  Internal 3-28-24 Dr. Maynard   RECORDS from hospital/ED Care Everywhere 5-24-23   MEDICATION LIST Internal    GENERAL CARDIO RECORDS   (ALL APPOINTMENT TYPES)     LABS (CBC,BMP,CMP, TSH) Internal    EKG (STRIPS & REPORTS) In process 5-24-23 Requested    MONITORS (STRIPS & REPORTS) N/A    ECHOS (IMAGES AND REPORTS) In process 6-23-23 Requested   STRESS TESTS (IMAGES AND REPORTS) N/A    cMRI (IMAGES AND REPORTS) N/A    Cardiac cath (IMAGES AND REPORTS) N/A    CT/CTA (IMAGES AND REPORTS) In process 5-24-23 Requested     Action 6/19/24 [facility]  Fax #[phone]   Action Taken Requested:  Echo  CT Chest 6-23-23 5-24-23     Resolved images in PACS 6/20       Action 6/19/24   Fax #[phone]   Action Taken Requested EKG 5-24-23    Sent EKG to scanning 6/20

## 2024-06-20 ENCOUNTER — VIRTUAL VISIT (OUTPATIENT)
Dept: CARDIOLOGY | Facility: CLINIC | Age: 69
End: 2024-06-20
Attending: INTERNAL MEDICINE
Payer: COMMERCIAL

## 2024-06-20 ENCOUNTER — PRE VISIT (OUTPATIENT)
Dept: CARDIOLOGY | Facility: CLINIC | Age: 69
End: 2024-06-20
Payer: COMMERCIAL

## 2024-06-20 VITALS
HEART RATE: 72 BPM | DIASTOLIC BLOOD PRESSURE: 81 MMHG | WEIGHT: 144 LBS | BODY MASS INDEX: 22.6 KG/M2 | SYSTOLIC BLOOD PRESSURE: 128 MMHG | HEIGHT: 67 IN

## 2024-06-20 DIAGNOSIS — Z95.810 ICD (IMPLANTABLE CARDIOVERTER-DEFIBRILLATOR), DUAL, IN SITU: ICD-10-CM

## 2024-06-20 DIAGNOSIS — I42.8 OTHER CARDIOMYOPATHY (H): Primary | ICD-10-CM

## 2024-06-20 LAB
MDC_IDC_EPISODE_DTM: NORMAL
MDC_IDC_EPISODE_DURATION: 5 S
MDC_IDC_EPISODE_DURATION: 7 S
MDC_IDC_EPISODE_DURATION: 7 S
MDC_IDC_EPISODE_ID: NORMAL
MDC_IDC_EPISODE_TYPE: NORMAL
MDC_IDC_EPISODE_TYPE_INDUCED: NO
MDC_IDC_LEAD_CONNECTION_STATUS: NORMAL
MDC_IDC_LEAD_IMPLANT_DT: NORMAL
MDC_IDC_LEAD_LOCATION: NORMAL
MDC_IDC_LEAD_LOCATION_DETAIL_1: NORMAL
MDC_IDC_LEAD_MFG: NORMAL
MDC_IDC_LEAD_MODEL: NORMAL
MDC_IDC_LEAD_POLARITY_TYPE: NORMAL
MDC_IDC_LEAD_SERIAL: NORMAL
MDC_IDC_MSMT_BATTERY_DTM: NORMAL
MDC_IDC_MSMT_BATTERY_REMAINING_LONGEVITY: 5 MO
MDC_IDC_MSMT_BATTERY_REMAINING_PERCENTAGE: 5 %
MDC_IDC_MSMT_BATTERY_STATUS: NORMAL
MDC_IDC_MSMT_CAP_CHARGE_DTM: NORMAL
MDC_IDC_MSMT_CAP_CHARGE_DTM: NORMAL
MDC_IDC_MSMT_CAP_CHARGE_ENERGY: 1 J
MDC_IDC_MSMT_CAP_CHARGE_TIME: 0.3 S
MDC_IDC_MSMT_CAP_CHARGE_TIME: 12 S
MDC_IDC_MSMT_CAP_CHARGE_TYPE: NORMAL
MDC_IDC_MSMT_CAP_CHARGE_TYPE: NORMAL
MDC_IDC_MSMT_LEADCHNL_RV_IMPEDANCE_VALUE: 654 OHM
MDC_IDC_PG_IMPLANT_DTM: NORMAL
MDC_IDC_PG_MFG: NORMAL
MDC_IDC_PG_MODEL: NORMAL
MDC_IDC_PG_SERIAL: NORMAL
MDC_IDC_PG_TYPE: NORMAL
MDC_IDC_SESS_CLINIC_NAME: NORMAL
MDC_IDC_SESS_DTM: NORMAL
MDC_IDC_SESS_TYPE: NORMAL
MDC_IDC_SET_BRADY_LOWRATE: 40 {BEATS}/MIN
MDC_IDC_SET_BRADY_MODE: NORMAL
MDC_IDC_SET_LEADCHNL_RV_PACING_AMPLITUDE: 2.4 V
MDC_IDC_SET_LEADCHNL_RV_PACING_POLARITY: NORMAL
MDC_IDC_SET_LEADCHNL_RV_PACING_PULSEWIDTH: 0.5 MS
MDC_IDC_SET_LEADCHNL_RV_SENSING_ADAPTATION_MODE: NORMAL
MDC_IDC_SET_LEADCHNL_RV_SENSING_POLARITY: NORMAL
MDC_IDC_SET_LEADCHNL_RV_SENSING_SENSITIVITY: 0.6 MV
MDC_IDC_SET_ZONE_DETECTION_INTERVAL: 273 MS
MDC_IDC_SET_ZONE_DETECTION_INTERVAL: 333 MS
MDC_IDC_SET_ZONE_STATUS: NORMAL
MDC_IDC_SET_ZONE_STATUS: NORMAL
MDC_IDC_SET_ZONE_TYPE: NORMAL
MDC_IDC_SET_ZONE_TYPE: NORMAL
MDC_IDC_SET_ZONE_VENDOR_TYPE: NORMAL
MDC_IDC_SET_ZONE_VENDOR_TYPE: NORMAL
MDC_IDC_STAT_BRADY_DTM_END: NORMAL
MDC_IDC_STAT_BRADY_DTM_START: NORMAL
MDC_IDC_STAT_BRADY_RV_PERCENT_PACED: 0 %
MDC_IDC_STAT_EPISODE_RECENT_COUNT: 0
MDC_IDC_STAT_EPISODE_RECENT_COUNT: 3
MDC_IDC_STAT_EPISODE_RECENT_COUNT_DTM_END: NORMAL
MDC_IDC_STAT_EPISODE_RECENT_COUNT_DTM_START: NORMAL
MDC_IDC_STAT_EPISODE_TYPE: NORMAL
MDC_IDC_STAT_EPISODE_VENDOR_TYPE: NORMAL
MDC_IDC_STAT_TACHYTHERAPY_ATP_DELIVERED_RECENT: 0
MDC_IDC_STAT_TACHYTHERAPY_ATP_DELIVERED_TOTAL: 0
MDC_IDC_STAT_TACHYTHERAPY_RECENT_DTM_END: NORMAL
MDC_IDC_STAT_TACHYTHERAPY_RECENT_DTM_START: NORMAL
MDC_IDC_STAT_TACHYTHERAPY_SHOCKS_ABORTED_RECENT: 0
MDC_IDC_STAT_TACHYTHERAPY_SHOCKS_ABORTED_TOTAL: 0
MDC_IDC_STAT_TACHYTHERAPY_SHOCKS_DELIVERED_RECENT: 0
MDC_IDC_STAT_TACHYTHERAPY_SHOCKS_DELIVERED_TOTAL: 1
MDC_IDC_STAT_TACHYTHERAPY_TOTAL_DTM_END: NORMAL
MDC_IDC_STAT_TACHYTHERAPY_TOTAL_DTM_START: NORMAL

## 2024-06-20 PROCEDURE — 99215 OFFICE O/P EST HI 40 MIN: CPT | Mod: 95 | Performed by: INTERNAL MEDICINE

## 2024-06-20 ASSESSMENT — PAIN SCALES - GENERAL: PAINLEVEL: NO PAIN (0)

## 2024-06-20 NOTE — PROGRESS NOTES
Virtual Visit Details    Type of service:  Video Visit     HPI:     Mr. Hester is a pleasant 69-year-old male followed by Dr. Maynard in the heart failure clinic.  He has had primary prevention ICD in place for many years and has had approximately 4 generator replacements.      Patient has history of LV dysfunction and LV thrombus with stroke.  He is currently being treated with warfarin.  He has not required heart failure admissions.  He does tend to run relatively low blood pressure.    His ICD was interrogated recently and shows a battery longevity of about 5 months remaining.  In the past the device changes have been undertaken in [place] but given his follow-up here and the fact that the EP in [place] is retired we will arrange for the device to be changed at the [facility].    Mr. Hester does have history of ischemic heart disease but is well compensated.  He is taking warfarin and generally has an INR in the range of 3.0.  We will want to have him hold his warfarin for about 2 days prior to the procedure.    At this visit Mr. Hester was present along with a friend who is a nurse and will be accompanying him for the device change probably in the October timeframe.      Dr Maynard note 3/24  His first MI was in 1995.  In 2003, he had an anterior wall MI requiring stenting.  He had a single-chamber ICD implanted in 03/2004 for primary prevention and a generator change on 01/2010 and again in 06/2011.  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  He has regained function of his left arm but his visual deficit persists.     Overall he has had very long-term stability in his cardiac function with no functional limitation and no diuretic requirement.   We had tried to put him on Entresto previously and he did not tolerate it.     Major health updates:   He did have COVID in the fall in the setting  of having COVID-19 he was hospitalized for several weeks and did have what was thought to be a new stroke.  He remains on Coumadin long-term for history of LV thrombus and history of stroke without recovery in LV function.     PAST MEDICAL HISTORY:  Past Medical History:   Diagnosis Date    Atrial fibrillation (H)     Chronic systolic congestive heart failure (H)     CVA (cerebral vascular accident) (H)     HTN (hypertension)     Hyperlipidemia     ICD (implantable cardioverter-defibrillator) in place     Ischemic cardiomyopathy     Peripheral vascular disease (H24)        CURRENT MEDICATIONS:  Current Outpatient Medications   Medication Sig Dispense Refill    aspirin (ASA) 325 MG tablet Take 0.5 tablets (162 mg) by mouth daily (Patient not taking: Reported on 3/28/2024) 45 tablet 3    aspirin 81 MG EC tablet Take 81 mg by mouth daily      carvedilol (COREG) 12.5 MG tablet TAKE 1 1/2 TABLETS BY MOUTH TWO TIMES PER  tablet 3    enalapril (VASOTEC) 5 MG tablet Take 1 tablet (5 mg) by mouth 2 times daily 180 tablet 1    ezetimibe (ZETIA) 10 MG tablet Take 1 tablet by mouth daily at 2 pm      potassium chloride ER (KLOR-CON M) 10 MEQ CR tablet Take 10 mEq by mouth once a week Take 1 tablet by mouth three times per week.      rosuvastatin (CRESTOR) 40 MG tablet TAKE 1 TABLET BY MOUTH EVERY DAY 90 tablet 0    SPIRONOLACTONE PO Take 12.5 mg by mouth daily      warfarin ANTICOAGULANT (COUMADIN) 2 MG tablet TAKE 1.5 TABLETS TUES THURS AND SAT AND 2 TABLETS REST OF WEEK OR AS DIRECTED BY WARFARIN CLINIC.         PAST SURGICAL HISTORY:  No past surgical history on file.    ALLERGIES:     Allergies   Allergen Reactions    Alcohol     Aspirin-Dipyridamole Er     Demerol [Meperidine]     Flagyl [Metronidazole]     Levaquin [Levofloxacin]     Pollen Extract     Sulfamethoxazole-Trimethoprim     Entresto [Sacubitril-Valsartan] Fatigue     Did not tolerate    Prasugrel Itching and Rash       FAMILY HISTORY:  No family history  "on file.    SOCIAL HISTORY:  Social History     Tobacco Use    Smoking status: Never    Smokeless tobacco: Never   Substance Use Topics    Alcohol use: No    Drug use: No       ROS:   Constitutional: No fever, chills, or sweats. Weight stable.   ENT: No visual disturbance, ear ache, epistaxis, sore throat.   Cardiovascular: As per HPI.   Respiratory: No cough, hemoptysis.    GI: No nausea, vomiting  : No hematuria.   Integument: Negative.   Psychiatric: Negative.   Hematologic:   no easy bleeding.  Neuro: Negative.   Endocrinology: No significant heat or cold intolerance   Musculoskeletal: No myalgia.    Exam:  /81   Pulse 72   Ht 1.702 m (5' 7\")   Wt 65.3 kg (144 lb)   BMI 22.55 kg/m    GENERAL APPEARANCE: healthy, alert and no distress  HEENT: no icterus,  no central cyanosis  NECK:  JVP not elevated  RESPIRATORY no rales, rhonchi or wheezes, no use of accessory muscles, no retractions, respirations are unlabored, normal respiratory rate  EXTREMITIES: peripheral pulses normal, no edema, no bruits  NEURO: alert and oriented to person/place/time, normal speech, gait and affect  SKIN: no ecchymoses, no rashes reported    Labs:  CBC RESULTS:   Lab Results   Component Value Date    WBC 7.1 07/21/2016    RBC 5.37 07/21/2016    HGB 15.0 07/21/2016    HCT 46.5 07/21/2016    MCV 87 07/21/2016    MCH 27.9 07/21/2016    MCHC 32.3 07/21/2016    RDW 14.0 07/21/2016     07/21/2016       BMP RESULTS:  Lab Results   Component Value Date     03/28/2024     10/10/2019    POTASSIUM 4.3 03/28/2024    POTASSIUM 3.6 11/17/2021    POTASSIUM 3.4 10/10/2019    CHLORIDE 100 03/28/2024    CHLORIDE 102 11/17/2021    CHLORIDE 101 10/10/2019    CO2 29 03/28/2024    CO2 31 11/17/2021    CO2 28 10/10/2019    ANIONGAP 8 03/28/2024    ANIONGAP 7 11/17/2021    ANIONGAP 6 10/10/2019     (H) 03/28/2024    GLC 85 11/17/2021     (H) 10/10/2019    BUN 9.9 03/28/2024    BUN 8 11/17/2021    BUN 6 (L) 10/10/2019 "    CR 0.84 03/28/2024    CR 0.88 10/10/2019    GFRESTIMATED >90 03/28/2024    GFRESTIMATED >90 10/10/2019    GFRESTBLACK >90 10/10/2019    JD 9.2 03/28/2024    JD 8.5 10/10/2019        INR RESULTS:  Lab Results   Component Value Date    INR 1.00 07/21/2016       Procedures:  PULMONARY FUNCTION TESTS:        No data to display                  ECHOCARDIOGRAM:  2021   Procedure  Echocardiogram with two-dimensional, color and spectral Doppler performed.  Contrast Optison. Optison (NDC #6748-9278-40) given intravenously. Patient was  given 5 ml mixture of 3 ml Optison and 6 ml saline. 4 ml wasted. IV start  location L Upper arm .  ______________________________________________________________________________  Interpretation Summary  Biplane LVEF is 30%.  Mild left ventricular dilation is present.  Apical wall akinesis with small mural thrombus.  Right ventricular function, chamber size, wall motion, and thickness are  normal.  Pulmonary artery systolic pressure cannot be assessed.  The inferior vena cava is normal.  No pericardial effusion is present.  _  Left Ventricle  Biplane LVEF is 30%. Left ventricular wall thickness is normal. Mild left  ventricular dilation is present. Left ventricular diastolic function is  indeterminate. Apical wall akinesis with small mural thrombus.     Right Ventricle  Right ventricular function, chamber size, wall motion, and thickness are  normal.        Assessment and Plan:   1.  Ischemic cardiomyopathy with ICD in situ approaching end of battery life (estimated 5 months)  2.  Heart failure under good control followed in [facility] by Dr. Maynard    Plan  1.  Schedule for outpatient device change at end of October--- to be modified if remote monitoring shows need based on battery longevity estimates  2.  Patient to hold warfarin for about 2 days before procedure #1  3.  Schedule in the early afternoon as patient will be driving in from [place] that morning  4.  Continue  remote ICD monitoring    Total elapsed time today for chart review, clinic visit and documentation 40 minutes    Video on 3: 30 p.m. off 3: 50 PM  Platform [employer]  Patient at home; clinic [facility] heart    I very much appreciated the opportunity to see and assess Matt ROLDAN Hester in the clinic today. Please do not hesitate to contact my office if you have any questions or concerns.      Jude Walker MD  Cardiac Arrhythmia Service  [facility]  [phone]     CC  SELF, REFERRED

## 2024-06-20 NOTE — LETTER
6/20/2024      RE: Matt Hester  [address]  [place] [address]       Dear Colleague,    Thank you for the opportunity to participate in the care of your patient, Matt Hester, at the [facility] [place] [facility]. Please see a copy of my visit note below.    HPI:     Mr. Hester is a pleasant 69-year-old male followed by Dr. Maynard in the heart failure clinic.  He has had primary prevention ICD in place for many years and has had approximately 4 generator replacements.      Patient has history of LV dysfunction and LV thrombus with stroke.  He is currently being treated with warfarin.  He has not required heart failure admissions.  He does tend to run relatively low blood pressure.    His ICD was interrogated recently and shows a battery longevity of about 5 months remaining.  In the past the device changes have been undertaken in [place] but given his follow-up here and the fact that the EP in [place] is retired we will arrange for the device to be changed at the [place].    Mr. Hester does have history of ischemic heart disease but is well compensated.  He is taking warfarin and generally has an INR in the range of 3.0.  We will want to have him hold his warfarin for about 2 days prior to the procedure.    At this visit Mr. Hester was present along with a friend who is a nurse and will be accompanying him for the device change probably in the October timeframe.      Dr Maynard note 3/24  His first MI was in 1995.  In 2003, he had an anterior wall MI requiring stenting.  He had a single-chamber ICD implanted in 03/2004 for primary prevention and a generator change on 01/2010 and again in 06/2011.  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  He has regained function of his left arm but his visual deficit  persists.     Overall he has had very long-term stability in his cardiac function with no functional limitation and no diuretic requirement.   We had tried to put him on Entresto previously and he did not tolerate it.     Major health updates:   He did have COVID in the fall in the setting of having COVID-19 he was hospitalized for several weeks and did have what was thought to be a new stroke.  He remains on Coumadin long-term for history of LV thrombus and history of stroke without recovery in LV function.     PAST MEDICAL HISTORY:  Past Medical History:   Diagnosis Date    Atrial fibrillation (H)     Chronic systolic congestive heart failure (H)     CVA (cerebral vascular accident) (H)     HTN (hypertension)     Hyperlipidemia     ICD (implantable cardioverter-defibrillator) in place     Ischemic cardiomyopathy     Peripheral vascular disease (H24)        CURRENT MEDICATIONS:  Current Outpatient Medications   Medication Sig Dispense Refill    aspirin (ASA) 325 MG tablet Take 0.5 tablets (162 mg) by mouth daily (Patient not taking: Reported on 3/28/2024) 45 tablet 3    aspirin 81 MG EC tablet Take 81 mg by mouth daily      carvedilol (COREG) 12.5 MG tablet TAKE 1 1/2 TABLETS BY MOUTH TWO TIMES PER  tablet 3    enalapril (VASOTEC) 5 MG tablet Take 1 tablet (5 mg) by mouth 2 times daily 180 tablet 1    ezetimibe (ZETIA) 10 MG tablet Take 1 tablet by mouth daily at 2 pm      potassium chloride ER (KLOR-CON M) 10 MEQ CR tablet Take 10 mEq by mouth once a week Take 1 tablet by mouth three times per week.      rosuvastatin (CRESTOR) 40 MG tablet TAKE 1 TABLET BY MOUTH EVERY DAY 90 tablet 0    SPIRONOLACTONE PO Take 12.5 mg by mouth daily      warfarin ANTICOAGULANT (COUMADIN) 2 MG tablet TAKE 1.5 TABLETS TUES THURS AND SAT AND 2 TABLETS REST OF WEEK OR AS DIRECTED BY WARFARIN CLINIC.         PAST SURGICAL HISTORY:  No past surgical history on file.    ALLERGIES:     Allergies   Allergen Reactions    Alcohol      "Aspirin-Dipyridamole Er     Demerol [Meperidine]     Flagyl [Metronidazole]     Levaquin [Levofloxacin]     Pollen Extract     Sulfamethoxazole-Trimethoprim     Entresto [Sacubitril-Valsartan] Fatigue     Did not tolerate    Prasugrel Itching and Rash       FAMILY HISTORY:  No family history on file.    SOCIAL HISTORY:  Social History     Tobacco Use    Smoking status: Never    Smokeless tobacco: Never   Substance Use Topics    Alcohol use: No    Drug use: No       ROS:   Constitutional: No fever, chills, or sweats. Weight stable.   ENT: No visual disturbance, ear ache, epistaxis, sore throat.   Cardiovascular: As per HPI.   Respiratory: No cough, hemoptysis.    GI: No nausea, vomiting  : No hematuria.   Integument: Negative.   Psychiatric: Negative.   Hematologic:   no easy bleeding.  Neuro: Negative.   Endocrinology: No significant heat or cold intolerance   Musculoskeletal: No myalgia.    Exam:  /81   Pulse 72   Ht 1.702 m (5' 7\")   Wt 65.3 kg (144 lb)   BMI 22.55 kg/m    GENERAL APPEARANCE: healthy, alert and no distress  HEENT: no icterus,  no central cyanosis  NECK:  JVP not elevated  RESPIRATORY no rales, rhonchi or wheezes, no use of accessory muscles, no retractions, respirations are unlabored, normal respiratory rate  EXTREMITIES: peripheral pulses normal, no edema, no bruits  NEURO: alert and oriented to person/place/time, normal speech, gait and affect  SKIN: no ecchymoses, no rashes reported    Labs:  CBC RESULTS:   Lab Results   Component Value Date    WBC 7.1 07/21/2016    RBC 5.37 07/21/2016    HGB 15.0 07/21/2016    HCT 46.5 07/21/2016    MCV 87 07/21/2016    MCH 27.9 07/21/2016    MCHC 32.3 07/21/2016    RDW 14.0 07/21/2016     07/21/2016       BMP RESULTS:  Lab Results   Component Value Date     03/28/2024     10/10/2019    POTASSIUM 4.3 03/28/2024    POTASSIUM 3.6 11/17/2021    POTASSIUM 3.4 10/10/2019    CHLORIDE 100 03/28/2024    CHLORIDE 102 11/17/2021    " CHLORIDE 101 10/10/2019    CO2 29 03/28/2024    CO2 31 11/17/2021    CO2 28 10/10/2019    ANIONGAP 8 03/28/2024    ANIONGAP 7 11/17/2021    ANIONGAP 6 10/10/2019     (H) 03/28/2024    GLC 85 11/17/2021     (H) 10/10/2019    BUN 9.9 03/28/2024    BUN 8 11/17/2021    BUN 6 (L) 10/10/2019    CR 0.84 03/28/2024    CR 0.88 10/10/2019    GFRESTIMATED >90 03/28/2024    GFRESTIMATED >90 10/10/2019    GFRESTBLACK >90 10/10/2019    JD 9.2 03/28/2024    JD 8.5 10/10/2019        INR RESULTS:  Lab Results   Component Value Date    INR 1.00 07/21/2016       Procedures:  PULMONARY FUNCTION TESTS:        No data to display                  ECHOCARDIOGRAM:  2021   Procedure  Echocardiogram with two-dimensional, color and spectral Doppler performed.  Contrast Optison. Optison (NDC #5682-0868-63) given intravenously. Patient was  given 5 ml mixture of 3 ml Optison and 6 ml saline. 4 ml wasted. IV start  location L Upper arm .  ______________________________________________________________________________  Interpretation Summary  Biplane LVEF is 30%.  Mild left ventricular dilation is present.  Apical wall akinesis with small mural thrombus.  Right ventricular function, chamber size, wall motion, and thickness are  normal.  Pulmonary artery systolic pressure cannot be assessed.  The inferior vena cava is normal.  No pericardial effusion is present.  _  Left Ventricle  Biplane LVEF is 30%. Left ventricular wall thickness is normal. Mild left  ventricular dilation is present. Left ventricular diastolic function is  indeterminate. Apical wall akinesis with small mural thrombus.     Right Ventricle  Right ventricular function, chamber size, wall motion, and thickness are  normal.        Assessment and Plan:   1.  Ischemic cardiomyopathy with ICD in situ approaching end of battery life (estimated 5 months)  2.  Heart failure under good control followed in [facility] by Dr. Maynard    Plan  1.  Schedule for  outpatient device change at end of October--- to be modified if remote monitoring shows need based on battery longevity estimates  2.  Patient to hold warfarin for about 2 days before procedure #1  3.  Schedule in the early afternoon as patient will be driving in from [place] that morning  4.  Continue remote ICD monitoring    Total elapsed time today for chart review, clinic visit and documentation 40 minutes      I very much appreciated the opportunity to see and assess Matt Hester in the clinic today. Please do not hesitate to contact my office if you have any questions or concerns.          Please do not hesitate to contact me if you have any questions/concerns.     Sincerely,     Jude Walker MD

## 2024-06-20 NOTE — PATIENT INSTRUCTIONS
You were seen in the Electrophysiology Clinic today by: Dr Walker    Plan:   ICD generator change once battery hits RRT- to be followed by the device team         If you have further questions, please utilize [employer] to contact us.     Your Care Team:    EP Cardiology   Telephone Number     Nurse Line  Jaclyn Munguia, RN   Cori Hernandez, RN  Rambo Tracy, ALETA   [phone]     For scheduling appointments:   Leyla   [phone]   For procedure scheduling:    Leeanne Monae     [phone]   For the Device Clinic (Pacemakers, ICDs, Loop Recorders)    During business hours: [phone]  After business hours:   [phone]- select option 4 and ask for job code [id number].       On-call cardiologist for after hours or on weekends:   [phone], option #4, and ask to speak to the on-call cardiologist.     Cardiovascular Clinic:   [address] [place] [address]. [place] [address]      As always, Thank you for trusting us with your health care needs!

## 2024-06-20 NOTE — NURSING NOTE
Is the patient currently in the state of [place]? YES    Visit mode:VIDEO    If the visit is dropped, the patient can be reconnected by: VIDEO VISIT: Text to cell phone:   Telephone Information:   Mobile [phone]       Will anyone else be joining the visit? Possibly pts friend   (If patient encounters technical issues they should call [phone]    How would you like to obtain your AVS? MyChart    Are changes needed to the allergy or medication list? No    Patient denies any changes and states that all information remains accurate since last reviewed/verified.     Are refills needed on medications prescribed by this physician? NO    Reason for visit: Consult    Ada Ruiz MA VVF

## 2024-07-07 ENCOUNTER — HEALTH MAINTENANCE LETTER (OUTPATIENT)
Age: 69
End: 2024-07-07

## 2024-09-15 DIAGNOSIS — E78.5 HYPERLIPIDEMIA: ICD-10-CM

## 2024-09-19 ENCOUNTER — ANCILLARY PROCEDURE (OUTPATIENT)
Dept: CARDIOLOGY | Facility: CLINIC | Age: 69
End: 2024-09-19
Attending: INTERNAL MEDICINE
Payer: MEDICARE

## 2024-09-19 DIAGNOSIS — I25.5 ISCHEMIC CARDIOMYOPATHY: ICD-10-CM

## 2024-09-19 PROCEDURE — 93295 DEV INTERROG REMOTE 1/2/MLT: CPT | Performed by: INTERNAL MEDICINE

## 2024-09-19 PROCEDURE — 93296 REM INTERROG EVL PM/IDS: CPT

## 2024-09-21 NOTE — TELEPHONE ENCOUNTER
ROSUVASTATIN CALCIUM 40 MG TAB       Last Written Prescription Date:  5-23-24  Last Fill Quantity: 90,   # refills: 0  Last Office Visit : 6-20-23 [facility]                 3-28-24 [place]  Future Office visit:  none    Last LDL 5-1-23    Routing refill request to provider for review/approval because:  Overdue LDL, previous johan rf given

## 2024-09-26 ENCOUNTER — MYC MEDICAL ADVICE (OUTPATIENT)
Dept: CARDIOLOGY | Facility: CLINIC | Age: 69
End: 2024-09-26
Payer: COMMERCIAL

## 2024-09-26 DIAGNOSIS — I25.5 ISCHEMIC CARDIOMYOPATHY: Primary | ICD-10-CM

## 2024-09-26 RX ORDER — ROSUVASTATIN CALCIUM 40 MG/1
40 TABLET, COATED ORAL DAILY
Qty: 90 TABLET | Refills: 3 | Status: SHIPPED | OUTPATIENT
Start: 2024-09-26

## 2024-10-05 LAB
MDC_IDC_EPISODE_DTM: NORMAL
MDC_IDC_EPISODE_DURATION: 6 S
MDC_IDC_EPISODE_DURATION: 7 S
MDC_IDC_EPISODE_ID: NORMAL
MDC_IDC_EPISODE_TYPE: NORMAL
MDC_IDC_EPISODE_TYPE_INDUCED: NO
MDC_IDC_LEAD_CONNECTION_STATUS: NORMAL
MDC_IDC_LEAD_IMPLANT_DT: NORMAL
MDC_IDC_LEAD_LOCATION: NORMAL
MDC_IDC_LEAD_LOCATION_DETAIL_1: NORMAL
MDC_IDC_LEAD_MFG: NORMAL
MDC_IDC_LEAD_MODEL: NORMAL
MDC_IDC_LEAD_POLARITY_TYPE: NORMAL
MDC_IDC_LEAD_SERIAL: NORMAL
MDC_IDC_MSMT_BATTERY_DTM: NORMAL
MDC_IDC_MSMT_BATTERY_REMAINING_LONGEVITY: 3 MO
MDC_IDC_MSMT_BATTERY_REMAINING_PERCENTAGE: 2 %
MDC_IDC_MSMT_BATTERY_STATUS: NORMAL
MDC_IDC_MSMT_CAP_CHARGE_DTM: NORMAL
MDC_IDC_MSMT_CAP_CHARGE_DTM: NORMAL
MDC_IDC_MSMT_CAP_CHARGE_ENERGY: 1 J
MDC_IDC_MSMT_CAP_CHARGE_TIME: 0.3 S
MDC_IDC_MSMT_CAP_CHARGE_TIME: 12.5 S
MDC_IDC_MSMT_CAP_CHARGE_TYPE: NORMAL
MDC_IDC_MSMT_CAP_CHARGE_TYPE: NORMAL
MDC_IDC_MSMT_LEADCHNL_RV_IMPEDANCE_VALUE: 658 OHM
MDC_IDC_PG_IMPLANT_DTM: NORMAL
MDC_IDC_PG_MFG: NORMAL
MDC_IDC_PG_MODEL: NORMAL
MDC_IDC_PG_SERIAL: NORMAL
MDC_IDC_PG_TYPE: NORMAL
MDC_IDC_SESS_CLINIC_NAME: NORMAL
MDC_IDC_SESS_DTM: NORMAL
MDC_IDC_SESS_TYPE: NORMAL
MDC_IDC_SET_BRADY_LOWRATE: 40 {BEATS}/MIN
MDC_IDC_SET_BRADY_MODE: NORMAL
MDC_IDC_SET_LEADCHNL_RV_PACING_AMPLITUDE: 2.4 V
MDC_IDC_SET_LEADCHNL_RV_PACING_POLARITY: NORMAL
MDC_IDC_SET_LEADCHNL_RV_PACING_PULSEWIDTH: 0.5 MS
MDC_IDC_SET_LEADCHNL_RV_SENSING_ADAPTATION_MODE: NORMAL
MDC_IDC_SET_LEADCHNL_RV_SENSING_POLARITY: NORMAL
MDC_IDC_SET_LEADCHNL_RV_SENSING_SENSITIVITY: 0.6 MV
MDC_IDC_SET_ZONE_DETECTION_INTERVAL: 273 MS
MDC_IDC_SET_ZONE_DETECTION_INTERVAL: 333 MS
MDC_IDC_SET_ZONE_STATUS: NORMAL
MDC_IDC_SET_ZONE_STATUS: NORMAL
MDC_IDC_SET_ZONE_TYPE: NORMAL
MDC_IDC_SET_ZONE_TYPE: NORMAL
MDC_IDC_SET_ZONE_VENDOR_TYPE: NORMAL
MDC_IDC_SET_ZONE_VENDOR_TYPE: NORMAL
MDC_IDC_STAT_BRADY_DTM_END: NORMAL
MDC_IDC_STAT_BRADY_DTM_START: NORMAL
MDC_IDC_STAT_BRADY_RV_PERCENT_PACED: 0 %
MDC_IDC_STAT_EPISODE_RECENT_COUNT: 0
MDC_IDC_STAT_EPISODE_RECENT_COUNT: 4
MDC_IDC_STAT_EPISODE_RECENT_COUNT_DTM_END: NORMAL
MDC_IDC_STAT_EPISODE_RECENT_COUNT_DTM_START: NORMAL
MDC_IDC_STAT_EPISODE_TYPE: NORMAL
MDC_IDC_STAT_EPISODE_VENDOR_TYPE: NORMAL
MDC_IDC_STAT_TACHYTHERAPY_ATP_DELIVERED_RECENT: 0
MDC_IDC_STAT_TACHYTHERAPY_ATP_DELIVERED_TOTAL: 0
MDC_IDC_STAT_TACHYTHERAPY_RECENT_DTM_END: NORMAL
MDC_IDC_STAT_TACHYTHERAPY_RECENT_DTM_START: NORMAL
MDC_IDC_STAT_TACHYTHERAPY_SHOCKS_ABORTED_RECENT: 0
MDC_IDC_STAT_TACHYTHERAPY_SHOCKS_ABORTED_TOTAL: 0
MDC_IDC_STAT_TACHYTHERAPY_SHOCKS_DELIVERED_RECENT: 0
MDC_IDC_STAT_TACHYTHERAPY_SHOCKS_DELIVERED_TOTAL: 1
MDC_IDC_STAT_TACHYTHERAPY_TOTAL_DTM_END: NORMAL
MDC_IDC_STAT_TACHYTHERAPY_TOTAL_DTM_START: NORMAL

## 2024-11-01 ENCOUNTER — ANCILLARY PROCEDURE (OUTPATIENT)
Dept: CARDIOLOGY | Facility: CLINIC | Age: 69
End: 2024-11-01
Attending: INTERNAL MEDICINE
Payer: MEDICARE

## 2024-11-01 ENCOUNTER — TELEPHONE (OUTPATIENT)
Dept: CARDIOLOGY | Facility: CLINIC | Age: 69
End: 2024-11-01

## 2024-11-01 DIAGNOSIS — I25.5 ISCHEMIC CARDIOMYOPATHY: ICD-10-CM

## 2024-11-01 PROCEDURE — 99207 CARDIAC DEVICE CHECK - REMOTE: CPT | Performed by: INTERNAL MEDICINE

## 2024-11-01 NOTE — TELEPHONE ENCOUNTER
Patient called EP  requesting his transmission from his device be downloaded and put in care everywhere so that he can get his generator change done locally.     Leeanne Monae  [facility] Electrophysiology   [phone]

## 2024-11-03 ENCOUNTER — ANCILLARY PROCEDURE (OUTPATIENT)
Dept: CARDIOLOGY | Facility: CLINIC | Age: 69
End: 2024-11-03
Attending: INTERNAL MEDICINE
Payer: MEDICARE

## 2024-11-03 DIAGNOSIS — I25.5 ISCHEMIC CARDIOMYOPATHY: ICD-10-CM

## 2024-11-03 PROCEDURE — 99207 CARDIAC DEVICE CHECK - REMOTE: CPT | Performed by: INTERNAL MEDICINE

## 2024-11-04 LAB
MDC_IDC_EPISODE_DTM: NORMAL
MDC_IDC_EPISODE_ID: NORMAL
MDC_IDC_EPISODE_TYPE: NORMAL
MDC_IDC_LEAD_CONNECTION_STATUS: NORMAL
MDC_IDC_LEAD_IMPLANT_DT: NORMAL
MDC_IDC_LEAD_LOCATION: NORMAL
MDC_IDC_LEAD_LOCATION_DETAIL_1: NORMAL
MDC_IDC_LEAD_MFG: NORMAL
MDC_IDC_LEAD_MODEL: NORMAL
MDC_IDC_LEAD_POLARITY_TYPE: NORMAL
MDC_IDC_LEAD_SERIAL: NORMAL
MDC_IDC_MSMT_BATTERY_DTM: NORMAL
MDC_IDC_MSMT_BATTERY_STATUS: NORMAL
MDC_IDC_MSMT_CAP_CHARGE_DTM: NORMAL
MDC_IDC_MSMT_CAP_CHARGE_DTM: NORMAL
MDC_IDC_MSMT_CAP_CHARGE_ENERGY: 1 J
MDC_IDC_MSMT_CAP_CHARGE_TIME: 0.3 S
MDC_IDC_MSMT_CAP_CHARGE_TIME: 13 S
MDC_IDC_MSMT_CAP_CHARGE_TYPE: NORMAL
MDC_IDC_MSMT_CAP_CHARGE_TYPE: NORMAL
MDC_IDC_MSMT_LEADCHNL_RV_IMPEDANCE_VALUE: 620 OHM
MDC_IDC_MSMT_LEADCHNL_RV_PACING_THRESHOLD_AMPLITUDE: 0.8 V
MDC_IDC_MSMT_LEADCHNL_RV_PACING_THRESHOLD_PULSEWIDTH: 0.5 MS
MDC_IDC_PG_IMPLANT_DTM: NORMAL
MDC_IDC_PG_MFG: NORMAL
MDC_IDC_PG_MODEL: NORMAL
MDC_IDC_PG_SERIAL: NORMAL
MDC_IDC_PG_TYPE: NORMAL
MDC_IDC_SESS_CLINIC_NAME: NORMAL
MDC_IDC_SESS_DTM: NORMAL
MDC_IDC_SESS_TYPE: NORMAL
MDC_IDC_SET_BRADY_LOWRATE: 40 {BEATS}/MIN
MDC_IDC_SET_BRADY_MODE: NORMAL
MDC_IDC_SET_LEADCHNL_RV_PACING_AMPLITUDE: 2.4 V
MDC_IDC_SET_LEADCHNL_RV_PACING_POLARITY: NORMAL
MDC_IDC_SET_LEADCHNL_RV_PACING_PULSEWIDTH: 0.5 MS
MDC_IDC_SET_LEADCHNL_RV_SENSING_ADAPTATION_MODE: NORMAL
MDC_IDC_SET_LEADCHNL_RV_SENSING_POLARITY: NORMAL
MDC_IDC_SET_LEADCHNL_RV_SENSING_SENSITIVITY: 0.6 MV
MDC_IDC_SET_ZONE_DETECTION_INTERVAL: 273 MS
MDC_IDC_SET_ZONE_DETECTION_INTERVAL: 333 MS
MDC_IDC_SET_ZONE_STATUS: NORMAL
MDC_IDC_SET_ZONE_STATUS: NORMAL
MDC_IDC_SET_ZONE_TYPE: NORMAL
MDC_IDC_SET_ZONE_TYPE: NORMAL
MDC_IDC_SET_ZONE_VENDOR_TYPE: NORMAL
MDC_IDC_SET_ZONE_VENDOR_TYPE: NORMAL
MDC_IDC_STAT_BRADY_DTM_END: NORMAL
MDC_IDC_STAT_BRADY_DTM_START: NORMAL
MDC_IDC_STAT_BRADY_RV_PERCENT_PACED: 0 %
MDC_IDC_STAT_EPISODE_RECENT_COUNT: 0
MDC_IDC_STAT_EPISODE_RECENT_COUNT: 7
MDC_IDC_STAT_EPISODE_RECENT_COUNT_DTM_END: NORMAL
MDC_IDC_STAT_EPISODE_RECENT_COUNT_DTM_START: NORMAL
MDC_IDC_STAT_EPISODE_TYPE: NORMAL
MDC_IDC_STAT_EPISODE_VENDOR_TYPE: NORMAL
MDC_IDC_STAT_TACHYTHERAPY_ATP_DELIVERED_RECENT: 0
MDC_IDC_STAT_TACHYTHERAPY_ATP_DELIVERED_TOTAL: 0
MDC_IDC_STAT_TACHYTHERAPY_RECENT_DTM_END: NORMAL
MDC_IDC_STAT_TACHYTHERAPY_RECENT_DTM_START: NORMAL
MDC_IDC_STAT_TACHYTHERAPY_SHOCKS_ABORTED_RECENT: 0
MDC_IDC_STAT_TACHYTHERAPY_SHOCKS_ABORTED_TOTAL: 0
MDC_IDC_STAT_TACHYTHERAPY_SHOCKS_DELIVERED_RECENT: 0
MDC_IDC_STAT_TACHYTHERAPY_SHOCKS_DELIVERED_TOTAL: 1
MDC_IDC_STAT_TACHYTHERAPY_TOTAL_DTM_END: NORMAL
MDC_IDC_STAT_TACHYTHERAPY_TOTAL_DTM_START: NORMAL

## 2024-11-07 DIAGNOSIS — Z45.02 PULSE GENERATOR OF IMPLANTABLE CARDIOVERTER-DEFIBRILLATOR (ICD) AT END OF LIFE: Primary | ICD-10-CM

## 2024-11-07 LAB
MDC_IDC_EPISODE_DTM: NORMAL
MDC_IDC_EPISODE_ID: NORMAL
MDC_IDC_EPISODE_TYPE: NORMAL
MDC_IDC_LEAD_CONNECTION_STATUS: NORMAL
MDC_IDC_LEAD_IMPLANT_DT: NORMAL
MDC_IDC_LEAD_LOCATION: NORMAL
MDC_IDC_LEAD_LOCATION_DETAIL_1: NORMAL
MDC_IDC_LEAD_MFG: NORMAL
MDC_IDC_LEAD_MODEL: NORMAL
MDC_IDC_LEAD_POLARITY_TYPE: NORMAL
MDC_IDC_LEAD_SERIAL: NORMAL
MDC_IDC_MSMT_BATTERY_DTM: NORMAL
MDC_IDC_MSMT_BATTERY_REMAINING_LONGEVITY: 3 MO
MDC_IDC_MSMT_BATTERY_REMAINING_PERCENTAGE: 0 %
MDC_IDC_MSMT_BATTERY_STATUS: NORMAL
MDC_IDC_MSMT_CAP_CHARGE_DTM: NORMAL
MDC_IDC_MSMT_CAP_CHARGE_DTM: NORMAL
MDC_IDC_MSMT_CAP_CHARGE_ENERGY: 1 J
MDC_IDC_MSMT_CAP_CHARGE_TIME: 0.3 S
MDC_IDC_MSMT_CAP_CHARGE_TIME: 13 S
MDC_IDC_MSMT_CAP_CHARGE_TYPE: NORMAL
MDC_IDC_MSMT_CAP_CHARGE_TYPE: NORMAL
MDC_IDC_MSMT_LEADCHNL_RV_IMPEDANCE_VALUE: 641 OHM
MDC_IDC_MSMT_LEADCHNL_RV_PACING_THRESHOLD_AMPLITUDE: 0.8 V
MDC_IDC_MSMT_LEADCHNL_RV_PACING_THRESHOLD_PULSEWIDTH: 0.5 MS
MDC_IDC_PG_IMPLANT_DTM: NORMAL
MDC_IDC_PG_MFG: NORMAL
MDC_IDC_PG_MODEL: NORMAL
MDC_IDC_PG_SERIAL: NORMAL
MDC_IDC_PG_TYPE: NORMAL
MDC_IDC_SESS_CLINIC_NAME: NORMAL
MDC_IDC_SESS_DTM: NORMAL
MDC_IDC_SESS_TYPE: NORMAL
MDC_IDC_SET_BRADY_LOWRATE: 40 {BEATS}/MIN
MDC_IDC_SET_BRADY_MODE: NORMAL
MDC_IDC_SET_LEADCHNL_RV_PACING_AMPLITUDE: 2.4 V
MDC_IDC_SET_LEADCHNL_RV_PACING_POLARITY: NORMAL
MDC_IDC_SET_LEADCHNL_RV_PACING_PULSEWIDTH: 0.5 MS
MDC_IDC_SET_LEADCHNL_RV_SENSING_ADAPTATION_MODE: NORMAL
MDC_IDC_SET_LEADCHNL_RV_SENSING_POLARITY: NORMAL
MDC_IDC_SET_LEADCHNL_RV_SENSING_SENSITIVITY: 0.6 MV
MDC_IDC_SET_ZONE_DETECTION_INTERVAL: 273 MS
MDC_IDC_SET_ZONE_DETECTION_INTERVAL: 333 MS
MDC_IDC_SET_ZONE_STATUS: NORMAL
MDC_IDC_SET_ZONE_STATUS: NORMAL
MDC_IDC_SET_ZONE_TYPE: NORMAL
MDC_IDC_SET_ZONE_TYPE: NORMAL
MDC_IDC_SET_ZONE_VENDOR_TYPE: NORMAL
MDC_IDC_SET_ZONE_VENDOR_TYPE: NORMAL
MDC_IDC_STAT_BRADY_DTM_END: NORMAL
MDC_IDC_STAT_BRADY_DTM_START: NORMAL
MDC_IDC_STAT_BRADY_RV_PERCENT_PACED: 0 %
MDC_IDC_STAT_EPISODE_RECENT_COUNT: 0
MDC_IDC_STAT_EPISODE_RECENT_COUNT: 7
MDC_IDC_STAT_EPISODE_RECENT_COUNT_DTM_END: NORMAL
MDC_IDC_STAT_EPISODE_RECENT_COUNT_DTM_START: NORMAL
MDC_IDC_STAT_EPISODE_TYPE: NORMAL
MDC_IDC_STAT_EPISODE_VENDOR_TYPE: NORMAL
MDC_IDC_STAT_TACHYTHERAPY_ATP_DELIVERED_RECENT: 0
MDC_IDC_STAT_TACHYTHERAPY_ATP_DELIVERED_TOTAL: 0
MDC_IDC_STAT_TACHYTHERAPY_RECENT_DTM_END: NORMAL
MDC_IDC_STAT_TACHYTHERAPY_RECENT_DTM_START: NORMAL
MDC_IDC_STAT_TACHYTHERAPY_SHOCKS_ABORTED_RECENT: 0
MDC_IDC_STAT_TACHYTHERAPY_SHOCKS_ABORTED_TOTAL: 0
MDC_IDC_STAT_TACHYTHERAPY_SHOCKS_DELIVERED_RECENT: 0
MDC_IDC_STAT_TACHYTHERAPY_SHOCKS_DELIVERED_TOTAL: 1
MDC_IDC_STAT_TACHYTHERAPY_TOTAL_DTM_END: NORMAL
MDC_IDC_STAT_TACHYTHERAPY_TOTAL_DTM_START: NORMAL

## 2024-11-07 RX ORDER — SODIUM CHLORIDE 9 MG/ML
INJECTION, SOLUTION INTRAVENOUS CONTINUOUS
OUTPATIENT
Start: 2024-11-07

## 2024-11-07 RX ORDER — LIDOCAINE 40 MG/G
CREAM TOPICAL
OUTPATIENT
Start: 2024-11-07

## 2024-11-07 RX ORDER — CEFAZOLIN SODIUM 2 G/50ML
2 SOLUTION INTRAVENOUS
OUTPATIENT
Start: 2024-11-07

## 2024-11-07 NOTE — LETTER
November 7, 2024      TO: Matt Hester  [address]  [place] [address]         Dear Matt,    You are scheduled for an ICD generator change, at [facility]. The hospital is located at [address] [place] [address] [place].  If you need to cancel this procedure, please call [phone].       Visitor Policy: Two visitors.      Date:______  Time: _______________To the [facility] Waiting Room at the [facility]    1. Please review the attached instructions on showering before your procedure at the end of this letter.  2. Your history and physical will be completed by our advanced practice provider when you arrive.  3. Do not eat for 8 hours prior to arrival, you can drink water up until 2 hours prior.  4. Medications to continue:  - Anticoagulant (warfarin).  - Take all meds as prescribed, except for those noted below.  5. Medications to hold:    - Morning of: spironolactone  6. You will likely discharge the same day and need a .        Post-Procedure Instructions  Wound Care  Keep your incision (surgery wound) dry for 3 days.  After 3 days, you may remove the outer bandage.  Keep the strips of tape on.  They will be removed at your clinic visit.  Check for signs of infection each day.  These include increased redness, swelling, drainage or a fever over 101 F (38.3 C).  Call us immediately if you see any of   these signs.  If there are no signs of infection, you may shower in 3 days.  Do not submerge the incision (in a bath tub, hot tub, or swimming pool) until fully healed.  Pain  You may have mild to moderate pain for 3 to 5 days.  Take acetaminophen (Tylenol) or ibuprofen (Advil) for the pain.  Call us if the pain is severe or lasts more than 5 days.  Activity  You should slowly go back to your normal activities after 24 hours.  Healing will take 4 to 6 weeks.  No driving for 24 hours  For 3 days, do not raise your affected arm above your  shoulder  For 10 days, do not use your affected arm to push, pull, or lift anything over 10 pounds  Avoid climbing a ladder alone.  It is best to stay within 4 feet of the ground.  Avoid anything that may cause rough contact or a hard hit to your chest.  This includes football, hockey, and other contact sports.  Do not go swimming or boating alone.      Follow Up Appointments Date & Time   7-10 day incision check with device clinic     3 month visit with device check & provider        If you have further questions, please utilize [employer] to contact us.   If your question concerns the above instructions, contact:  Cori Hernandez RN   Electrophysiology Nurse Coordinator.  [phone]    If your question concerns the schedule/appointment times, contact:  [facility]   [phone]    Device Clinic (Pacemakers, Defibrillators, Loop Recorders)  [phone]        Showering Before Surgery   Your surgeon has asked you to take 2 showers before surgery.  Why is this important?  It is normal for bacteria (germs) to be on your skin. The skin protects us from these germs. When you have surgery, we cut the skin. Sometimes germs get into the cuts and cause infection (illness caused by germs). By following the instructions below and using special soap, you will lower the number of germs on your skin. This decreases your chance of infection.  Special soap  Buy or get 8 ounces of antiseptic surgical soap called 4% CHG. Common name brands of this soap are Hibiclens and Exidine.   You can find it at your local pharmacy, clinic or retail store. If you have trouble, ask your pharmacist to help you find the right substitute.   A note about shaving:  Do not shave within 12 inches of your incision (surgical cut) area for at least 3 days before surgery. Shaving can make small cuts in the skin. This puts you at a higher risk of infection.  Items you will need for each shower:   1 newly washed towel   4 ounces of one  of the above soaps  Follow these instructions:  The evening before surgery   1. Wash your hair and body with your regular shampoo and soap. Make sure you rinse the shampoo and soap from your hair and body.   2. Using clean hands, apply about 2 ounces of soap gently on your skin from the neck to your toes. Use on your groin area last. Do not use this soap on your face or head. If you get any soap in your eyes, ears or mouth, rinse right away.   3. Repeat step 2. It is very important to let the soap stay on your skin for at least 1 minute.   4. Rinse well and dry off using a clean towel.If you feel any tingling, itching or other irritation, rinse right away. It is normal to feel some coolness on the skin after using the antiseptic soap. Your skin may feel a bit dry after the shower, but do not use any lotions, creams or moisturizers. Do not use hair spray or other products in your hair.  5. Dress in freshly washed clothes or pajamas. Use fresh pillowcases and sheets on your bed.  The morning of surgery  1. Wash your hair and body with your regular shampoo and soap. Make sure you rinse the shampoo and soap from your hair and body.   2. Using clean hands, apply about 2 ounces of soap gently on your skin from the neck to your toes. Use on your groin area last. Do not use this soap on your face or head. If you get any soap in your eyes, ears or mouth, rinse right away.   3. Repeat step 2. It is very important to let the soap stay on your skin for at least 1 minute.   4. Rinse well and dry off using a clean towel.If you feel any tingling, itching or other irritation, rinse right away. It is normal to feel some coolness on the skin after using the antiseptic soap. Your skin may feel a bit dry after the shower, but do not use any lotions, creams or moisturizers. Do not use hair spray or other products in your hair.  5. Dress in clean clothes.  If you have any questions about showering or an allergy to CHG soap, please call  the Preadmissions Nursing Department at the hospital where you are having your surgery.  [facility]: [phone]  [facility]: [phone]  [place] [facility]: [phone] or [phone]  [facility]: [phone].  [facility]: [phone]  [place]: [phone]  [facility] ([place]): [phone]  [facility] ([facility]): [phone]  This phone number will be answered between the hours of 8:00 a.m. and 6:30 p.m. Monday through Friday.

## 2024-11-07 NOTE — PROGRESS NOTES
Gen change orders placed, letter started. Message sent to EP  to arrange.       RRT (Dr Walker saw pt June 2024)  Received: 3 days ago  Krerie De Dios, Mere Posada RN; Cori Hernandez RN  This pt needs a generator change.    Dr. Walker  Device: Nomanini E102 TELIGEN 100  RRT reached 11/1/2024  No Lead issues  Should have gen change w/in the next 12 weeks  Takes Warfarin  Pt notified you'll be calling.  I just spoke to the patient and it sounds like he is going to have his device changed out in [place] where he lives.      Thank you,  Kerrie De Dios RN

## 2024-11-07 NOTE — LETTER
November 7, 2024      TO: Matt Hester  [address]  [place] [address]         Dear Matt,    You are scheduled for an ICD generator change, at [facility]. The hospital is located at [address] [place] [address] [place].  If you need to cancel this procedure, please call [phone].       Visitor Policy: Two visitors.      Date:______  Time: _______________To the [facility] Waiting Room at the [facility]    1. Please review the attached instructions on showering before your procedure at the end of this letter.  2. Your history and physical will be completed by our advanced practice provider when you arrive.  3. Do not eat for 8 hours prior to arrival, you can drink water up until 2 hours prior.  4. Medications to continue:  - Anticoagulant (warfarin).  - Take all meds as prescribed, except for those noted below.  5. Medications to hold:    - Morning of: spironolactone.   6. You will likely discharge the same day and need a .        Post-Procedure Instructions  Wound Care  Keep your incision (surgery wound) dry for 3 days.  After 3 days, you may remove the outer bandage.  Keep the strips of tape on.  They will be removed at your clinic visit.  Check for signs of infection each day.  These include increased redness, swelling, drainage or a fever over 101 F (38.3 C).  Call us immediately if you see any of   these signs.  If there are no signs of infection, you may shower in 3 days.  Do not submerge the incision (in a bath tub, hot tub, or swimming pool) until fully healed.  Pain  You may have mild to moderate pain for 3 to 5 days.  Take acetaminophen (Tylenol) or ibuprofen (Advil) for the pain.  Call us if the pain is severe or lasts more than 5 days.  Activity  You should slowly go back to your normal activities after 24 hours.  Healing will take 4 to 6 weeks.  No driving for 24 hours  For 3 days, do not raise your affected arm above your  shoulder  For 10 days, do not use your affected arm to push, pull, or lift anything over 10 pounds  Avoid climbing a ladder alone.  It is best to stay within 4 feet of the ground.  Avoid anything that may cause rough contact or a hard hit to your chest.  This includes football, hockey, and other contact sports.  Do not go swimming or boating alone.      Follow Up Appointments Date & Time   7-10 day incision check with device clinic     3 month visit with device check & provider        If you have further questions, please utilize [employer] to contact us.   If your question concerns the above instructions, contact:  Cori Hernandez RN   Electrophysiology Nurse Coordinator.  [phone]    If your question concerns the schedule/appointment times, contact:  [facility]   [phone]    Device Clinic (Pacemakers, Defibrillators, Loop Recorders)  [phone]        Showering Before Surgery   Your surgeon has asked you to take 2 showers before surgery.  Why is this important?  It is normal for bacteria (germs) to be on your skin. The skin protects us from these germs. When you have surgery, we cut the skin. Sometimes germs get into the cuts and cause infection (illness caused by germs). By following the instructions below and using special soap, you will lower the number of germs on your skin. This decreases your chance of infection.  Special soap  Buy or get 8 ounces of antiseptic surgical soap called 4% CHG. Common name brands of this soap are Hibiclens and Exidine.   You can find it at your local pharmacy, clinic or retail store. If you have trouble, ask your pharmacist to help you find the right substitute.   A note about shaving:  Do not shave within 12 inches of your incision (surgical cut) area for at least 3 days before surgery. Shaving can make small cuts in the skin. This puts you at a higher risk of infection.  Items you will need for each shower:   1 newly washed towel   4 ounces of one  of the above soaps  Follow these instructions:  The evening before surgery   1. Wash your hair and body with your regular shampoo and soap. Make sure you rinse the shampoo and soap from your hair and body.   2. Using clean hands, apply about 2 ounces of soap gently on your skin from the neck to your toes. Use on your groin area last. Do not use this soap on your face or head. If you get any soap in your eyes, ears or mouth, rinse right away.   3. Repeat step 2. It is very important to let the soap stay on your skin for at least 1 minute.   4. Rinse well and dry off using a clean towel.If you feel any tingling, itching or other irritation, rinse right away. It is normal to feel some coolness on the skin after using the antiseptic soap. Your skin may feel a bit dry after the shower, but do not use any lotions, creams or moisturizers. Do not use hair spray or other products in your hair.  5. Dress in freshly washed clothes or pajamas. Use fresh pillowcases and sheets on your bed.  The morning of surgery  1. Wash your hair and body with your regular shampoo and soap. Make sure you rinse the shampoo and soap from your hair and body.   2. Using clean hands, apply about 2 ounces of soap gently on your skin from the neck to your toes. Use on your groin area last. Do not use this soap on your face or head. If you get any soap in your eyes, ears or mouth, rinse right away.   3. Repeat step 2. It is very important to let the soap stay on your skin for at least 1 minute.   4. Rinse well and dry off using a clean towel.If you feel any tingling, itching or other irritation, rinse right away. It is normal to feel some coolness on the skin after using the antiseptic soap. Your skin may feel a bit dry after the shower, but do not use any lotions, creams or moisturizers. Do not use hair spray or other products in your hair.  5. Dress in clean clothes.  If you have any questions about showering or an allergy to CHG soap, please call  the Preadmissions Nursing Department at the hospital where you are having your surgery.  [facility]: [phone]  [facility]: [phone]  [place]: [phone] or [phone]  [facility]: [phone].  [facility]: [phone]  [place]: [phone]  [facility] ([place]): [phone]  [facility] [phone]  This phone number will be answered between the hours of 8:00 a.m. and 6:30 p.m. Monday through Friday.

## 2025-02-04 DIAGNOSIS — I50.22 CHRONIC SYSTOLIC CONGESTIVE HEART FAILURE (H): Primary | ICD-10-CM

## 2025-02-05 ENCOUNTER — TELEPHONE (OUTPATIENT)
Dept: CARDIOLOGY | Facility: CLINIC | Age: 70
End: 2025-02-05
Payer: COMMERCIAL

## 2025-02-05 NOTE — TELEPHONE ENCOUNTER
Patient confirmed scheduled appointment:  Date: 02/14/25  Time: 1pm   Visit type: rtn hf   Provider: troy   Location: [facility]  Testing/imaging: n/a   Additional notes: n/a

## 2025-02-11 ENCOUNTER — PATIENT OUTREACH (OUTPATIENT)
Dept: CARDIOLOGY | Facility: CLINIC | Age: 70
End: 2025-02-11
Payer: COMMERCIAL

## 2025-02-11 RX ORDER — FUROSEMIDE 20 MG/1
20 TABLET ORAL DAILY PRN
COMMUNITY

## 2025-02-11 NOTE — TELEPHONE ENCOUNTER
Matt is willing to get labs done at [facility].  Confirmed that labs had been received    He also shared that he had a new pacer put in on 1/7/2025, but he would prefer to have it followed by [facility].  Will notify the device team.

## 2025-02-17 ENCOUNTER — ANCILLARY PROCEDURE (OUTPATIENT)
Dept: CARDIOLOGY | Facility: CLINIC | Age: 70
End: 2025-02-17
Attending: INTERNAL MEDICINE
Payer: MEDICARE

## 2025-02-17 DIAGNOSIS — I25.5 ISCHEMIC CARDIOMYOPATHY: ICD-10-CM

## 2025-02-17 PROCEDURE — 93295 DEV INTERROG REMOTE 1/2/MLT: CPT | Performed by: INTERNAL MEDICINE

## 2025-02-17 PROCEDURE — 93296 REM INTERROG EVL PM/IDS: CPT

## 2025-02-18 LAB
MDC_IDC_EPISODE_DTM: NORMAL
MDC_IDC_EPISODE_ID: NORMAL
MDC_IDC_EPISODE_TYPE: NORMAL
MDC_IDC_LEAD_CONNECTION_STATUS: NORMAL
MDC_IDC_LEAD_IMPLANT_DT: NORMAL
MDC_IDC_LEAD_LOCATION: NORMAL
MDC_IDC_LEAD_LOCATION_DETAIL_1: NORMAL
MDC_IDC_LEAD_MFG: NORMAL
MDC_IDC_LEAD_MODEL: NORMAL
MDC_IDC_LEAD_POLARITY_TYPE: NORMAL
MDC_IDC_LEAD_SERIAL: NORMAL
MDC_IDC_MSMT_BATTERY_DTM: NORMAL
MDC_IDC_MSMT_BATTERY_REMAINING_LONGEVITY: 174 MO
MDC_IDC_MSMT_BATTERY_REMAINING_PERCENTAGE: 100 %
MDC_IDC_MSMT_BATTERY_STATUS: NORMAL
MDC_IDC_MSMT_CAP_CHARGE_DTM: NORMAL
MDC_IDC_MSMT_CAP_CHARGE_TIME: 9.8 S
MDC_IDC_MSMT_CAP_CHARGE_TYPE: NORMAL
MDC_IDC_MSMT_LEADCHNL_RV_IMPEDANCE_VALUE: 622 OHM
MDC_IDC_PG_IMPLANT_DTM: NORMAL
MDC_IDC_PG_MFG: NORMAL
MDC_IDC_PG_MODEL: NORMAL
MDC_IDC_PG_SERIAL: NORMAL
MDC_IDC_PG_TYPE: NORMAL
MDC_IDC_SESS_CLINIC_NAME: NORMAL
MDC_IDC_SESS_DTM: NORMAL
MDC_IDC_SESS_TYPE: NORMAL
MDC_IDC_SET_BRADY_LOWRATE: 40 {BEATS}/MIN
MDC_IDC_SET_BRADY_MODE: NORMAL
MDC_IDC_SET_LEADCHNL_RV_PACING_AMPLITUDE: 2.4 V
MDC_IDC_SET_LEADCHNL_RV_PACING_CAPTURE_MODE: NORMAL
MDC_IDC_SET_LEADCHNL_RV_PACING_POLARITY: NORMAL
MDC_IDC_SET_LEADCHNL_RV_PACING_PULSEWIDTH: 0.4 MS
MDC_IDC_SET_LEADCHNL_RV_SENSING_ADAPTATION_MODE: NORMAL
MDC_IDC_SET_LEADCHNL_RV_SENSING_POLARITY: NORMAL
MDC_IDC_SET_LEADCHNL_RV_SENSING_SENSITIVITY: 0.6 MV
MDC_IDC_SET_ZONE_DETECTION_INTERVAL: 240 MS
MDC_IDC_SET_ZONE_DETECTION_INTERVAL: 300 MS
MDC_IDC_SET_ZONE_DETECTION_INTERVAL: 333 MS
MDC_IDC_SET_ZONE_STATUS: NORMAL
MDC_IDC_SET_ZONE_TYPE: NORMAL
MDC_IDC_SET_ZONE_VENDOR_TYPE: NORMAL
MDC_IDC_STAT_BRADY_DTM_END: NORMAL
MDC_IDC_STAT_BRADY_DTM_START: NORMAL
MDC_IDC_STAT_BRADY_RV_PERCENT_PACED: 0 %
MDC_IDC_STAT_EPISODE_RECENT_COUNT: 0
MDC_IDC_STAT_EPISODE_RECENT_COUNT_DTM_END: NORMAL
MDC_IDC_STAT_EPISODE_RECENT_COUNT_DTM_START: NORMAL
MDC_IDC_STAT_EPISODE_TYPE: NORMAL
MDC_IDC_STAT_EPISODE_VENDOR_TYPE: NORMAL
MDC_IDC_STAT_TACHYTHERAPY_ATP_DELIVERED_RECENT: 0
MDC_IDC_STAT_TACHYTHERAPY_ATP_DELIVERED_TOTAL: 0
MDC_IDC_STAT_TACHYTHERAPY_RECENT_DTM_END: NORMAL
MDC_IDC_STAT_TACHYTHERAPY_RECENT_DTM_START: NORMAL
MDC_IDC_STAT_TACHYTHERAPY_SHOCKS_ABORTED_RECENT: 0
MDC_IDC_STAT_TACHYTHERAPY_SHOCKS_ABORTED_TOTAL: 0
MDC_IDC_STAT_TACHYTHERAPY_SHOCKS_DELIVERED_RECENT: 0
MDC_IDC_STAT_TACHYTHERAPY_SHOCKS_DELIVERED_TOTAL: 0
MDC_IDC_STAT_TACHYTHERAPY_TOTAL_DTM_END: NORMAL

## 2025-03-26 ENCOUNTER — ANCILLARY ORDERS (OUTPATIENT)
Dept: CARDIOLOGY | Facility: CLINIC | Age: 70
End: 2025-03-26
Payer: COMMERCIAL

## 2025-03-26 DIAGNOSIS — I25.5 ISCHEMIC CARDIOMYOPATHY: Primary | ICD-10-CM

## 2025-05-20 ENCOUNTER — ANCILLARY PROCEDURE (OUTPATIENT)
Dept: CARDIOLOGY | Facility: CLINIC | Age: 70
End: 2025-05-20
Attending: INTERNAL MEDICINE
Payer: MEDICARE

## 2025-05-20 DIAGNOSIS — I25.5 ISCHEMIC CARDIOMYOPATHY: ICD-10-CM

## 2025-05-20 LAB
MDC_IDC_EPISODE_DTM: NORMAL
MDC_IDC_EPISODE_ID: NORMAL
MDC_IDC_EPISODE_TYPE: NORMAL
MDC_IDC_LEAD_CONNECTION_STATUS: NORMAL
MDC_IDC_LEAD_IMPLANT_DT: NORMAL
MDC_IDC_LEAD_LOCATION: NORMAL
MDC_IDC_LEAD_LOCATION_DETAIL_1: NORMAL
MDC_IDC_LEAD_MFG: NORMAL
MDC_IDC_LEAD_MODEL: NORMAL
MDC_IDC_LEAD_POLARITY_TYPE: NORMAL
MDC_IDC_LEAD_SERIAL: NORMAL
MDC_IDC_MSMT_BATTERY_DTM: NORMAL
MDC_IDC_MSMT_BATTERY_REMAINING_LONGEVITY: 174 MO
MDC_IDC_MSMT_BATTERY_REMAINING_PERCENTAGE: 100 %
MDC_IDC_MSMT_BATTERY_STATUS: NORMAL
MDC_IDC_MSMT_CAP_CHARGE_DTM: NORMAL
MDC_IDC_MSMT_CAP_CHARGE_TIME: 10.1 S
MDC_IDC_MSMT_CAP_CHARGE_TYPE: NORMAL
MDC_IDC_MSMT_LEADCHNL_RV_IMPEDANCE_VALUE: 621 OHM
MDC_IDC_PG_IMPLANT_DTM: NORMAL
MDC_IDC_PG_MFG: NORMAL
MDC_IDC_PG_MODEL: NORMAL
MDC_IDC_PG_SERIAL: NORMAL
MDC_IDC_PG_TYPE: NORMAL
MDC_IDC_SESS_CLINIC_NAME: NORMAL
MDC_IDC_SESS_DTM: NORMAL
MDC_IDC_SESS_TYPE: NORMAL
MDC_IDC_SET_BRADY_LOWRATE: 40 {BEATS}/MIN
MDC_IDC_SET_BRADY_MODE: NORMAL
MDC_IDC_SET_LEADCHNL_RV_PACING_AMPLITUDE: 2.4 V
MDC_IDC_SET_LEADCHNL_RV_PACING_CAPTURE_MODE: NORMAL
MDC_IDC_SET_LEADCHNL_RV_PACING_POLARITY: NORMAL
MDC_IDC_SET_LEADCHNL_RV_PACING_PULSEWIDTH: 0.4 MS
MDC_IDC_SET_LEADCHNL_RV_SENSING_ADAPTATION_MODE: NORMAL
MDC_IDC_SET_LEADCHNL_RV_SENSING_POLARITY: NORMAL
MDC_IDC_SET_LEADCHNL_RV_SENSING_SENSITIVITY: 0.6 MV
MDC_IDC_SET_ZONE_DETECTION_INTERVAL: 240 MS
MDC_IDC_SET_ZONE_DETECTION_INTERVAL: 300 MS
MDC_IDC_SET_ZONE_DETECTION_INTERVAL: 333 MS
MDC_IDC_SET_ZONE_STATUS: NORMAL
MDC_IDC_SET_ZONE_TYPE: NORMAL
MDC_IDC_SET_ZONE_VENDOR_TYPE: NORMAL
MDC_IDC_STAT_BRADY_DTM_END: NORMAL
MDC_IDC_STAT_BRADY_DTM_START: NORMAL
MDC_IDC_STAT_BRADY_RV_PERCENT_PACED: 0 %
MDC_IDC_STAT_EPISODE_RECENT_COUNT: 0
MDC_IDC_STAT_EPISODE_RECENT_COUNT_DTM_END: NORMAL
MDC_IDC_STAT_EPISODE_RECENT_COUNT_DTM_START: NORMAL
MDC_IDC_STAT_EPISODE_TYPE: NORMAL
MDC_IDC_STAT_EPISODE_VENDOR_TYPE: NORMAL
MDC_IDC_STAT_TACHYTHERAPY_ATP_DELIVERED_RECENT: 0
MDC_IDC_STAT_TACHYTHERAPY_ATP_DELIVERED_TOTAL: 0
MDC_IDC_STAT_TACHYTHERAPY_RECENT_DTM_END: NORMAL
MDC_IDC_STAT_TACHYTHERAPY_RECENT_DTM_START: NORMAL
MDC_IDC_STAT_TACHYTHERAPY_SHOCKS_ABORTED_RECENT: 0
MDC_IDC_STAT_TACHYTHERAPY_SHOCKS_ABORTED_TOTAL: 0
MDC_IDC_STAT_TACHYTHERAPY_SHOCKS_DELIVERED_RECENT: 0
MDC_IDC_STAT_TACHYTHERAPY_SHOCKS_DELIVERED_TOTAL: 0
MDC_IDC_STAT_TACHYTHERAPY_TOTAL_DTM_END: NORMAL

## 2025-05-20 PROCEDURE — 93295 DEV INTERROG REMOTE 1/2/MLT: CPT | Performed by: INTERNAL MEDICINE

## 2025-05-20 PROCEDURE — 93296 REM INTERROG EVL PM/IDS: CPT

## 2025-06-01 ENCOUNTER — HEALTH MAINTENANCE LETTER (OUTPATIENT)
Age: 70
End: 2025-06-01

## 2025-08-21 ENCOUNTER — ANCILLARY PROCEDURE (OUTPATIENT)
Dept: CARDIOLOGY | Facility: CLINIC | Age: 70
End: 2025-08-21
Attending: INTERNAL MEDICINE
Payer: MEDICARE

## 2025-08-21 DIAGNOSIS — I25.5 ISCHEMIC CARDIOMYOPATHY: ICD-10-CM

## 2025-08-21 PROCEDURE — 93296 REM INTERROG EVL PM/IDS: CPT

## 2025-08-21 PROCEDURE — 93295 DEV INTERROG REMOTE 1/2/MLT: CPT | Performed by: INTERNAL MEDICINE

## 2025-08-22 LAB
MDC_IDC_EPISODE_DTM: NORMAL
MDC_IDC_EPISODE_DURATION: 14 S
MDC_IDC_EPISODE_ID: NORMAL
MDC_IDC_EPISODE_TYPE: NORMAL
MDC_IDC_EPISODE_TYPE_INDUCED: NO
MDC_IDC_LEAD_CONNECTION_STATUS: NORMAL
MDC_IDC_LEAD_IMPLANT_DT: NORMAL
MDC_IDC_LEAD_LOCATION: NORMAL
MDC_IDC_LEAD_LOCATION_DETAIL_1: NORMAL
MDC_IDC_LEAD_MFG: NORMAL
MDC_IDC_LEAD_MODEL: NORMAL
MDC_IDC_LEAD_POLARITY_TYPE: NORMAL
MDC_IDC_LEAD_SERIAL: NORMAL
MDC_IDC_MSMT_BATTERY_DTM: NORMAL
MDC_IDC_MSMT_BATTERY_REMAINING_LONGEVITY: 174 MO
MDC_IDC_MSMT_BATTERY_REMAINING_PERCENTAGE: 100 %
MDC_IDC_MSMT_BATTERY_STATUS: NORMAL
MDC_IDC_MSMT_CAP_CHARGE_DTM: NORMAL
MDC_IDC_MSMT_CAP_CHARGE_TIME: 10.1 S
MDC_IDC_MSMT_CAP_CHARGE_TYPE: NORMAL
MDC_IDC_MSMT_LEADCHNL_RV_IMPEDANCE_VALUE: 655 OHM
MDC_IDC_PG_IMPLANT_DTM: NORMAL
MDC_IDC_PG_MFG: NORMAL
MDC_IDC_PG_MODEL: NORMAL
MDC_IDC_PG_SERIAL: NORMAL
MDC_IDC_PG_TYPE: NORMAL
MDC_IDC_SESS_CLINIC_NAME: NORMAL
MDC_IDC_SESS_DTM: NORMAL
MDC_IDC_SESS_TYPE: NORMAL
MDC_IDC_SET_BRADY_LOWRATE: 40 {BEATS}/MIN
MDC_IDC_SET_BRADY_MODE: NORMAL
MDC_IDC_SET_LEADCHNL_RV_PACING_AMPLITUDE: 2.4 V
MDC_IDC_SET_LEADCHNL_RV_PACING_CAPTURE_MODE: NORMAL
MDC_IDC_SET_LEADCHNL_RV_PACING_POLARITY: NORMAL
MDC_IDC_SET_LEADCHNL_RV_PACING_PULSEWIDTH: 0.4 MS
MDC_IDC_SET_LEADCHNL_RV_SENSING_ADAPTATION_MODE: NORMAL
MDC_IDC_SET_LEADCHNL_RV_SENSING_POLARITY: NORMAL
MDC_IDC_SET_LEADCHNL_RV_SENSING_SENSITIVITY: 0.6 MV
MDC_IDC_SET_ZONE_DETECTION_INTERVAL: 240 MS
MDC_IDC_SET_ZONE_DETECTION_INTERVAL: 300 MS
MDC_IDC_SET_ZONE_DETECTION_INTERVAL: 333 MS
MDC_IDC_SET_ZONE_STATUS: NORMAL
MDC_IDC_SET_ZONE_TYPE: NORMAL
MDC_IDC_SET_ZONE_VENDOR_TYPE: NORMAL
MDC_IDC_STAT_BRADY_DTM_END: NORMAL
MDC_IDC_STAT_BRADY_DTM_START: NORMAL
MDC_IDC_STAT_BRADY_RV_PERCENT_PACED: 0 %
MDC_IDC_STAT_EPISODE_RECENT_COUNT: 0
MDC_IDC_STAT_EPISODE_RECENT_COUNT: 1
MDC_IDC_STAT_EPISODE_RECENT_COUNT_DTM_END: NORMAL
MDC_IDC_STAT_EPISODE_RECENT_COUNT_DTM_START: NORMAL
MDC_IDC_STAT_EPISODE_TYPE: NORMAL
MDC_IDC_STAT_EPISODE_VENDOR_TYPE: NORMAL
MDC_IDC_STAT_TACHYTHERAPY_ATP_DELIVERED_RECENT: 0
MDC_IDC_STAT_TACHYTHERAPY_ATP_DELIVERED_TOTAL: 0
MDC_IDC_STAT_TACHYTHERAPY_RECENT_DTM_END: NORMAL
MDC_IDC_STAT_TACHYTHERAPY_RECENT_DTM_START: NORMAL
MDC_IDC_STAT_TACHYTHERAPY_SHOCKS_ABORTED_RECENT: 0
MDC_IDC_STAT_TACHYTHERAPY_SHOCKS_ABORTED_TOTAL: 0
MDC_IDC_STAT_TACHYTHERAPY_SHOCKS_DELIVERED_RECENT: 0
MDC_IDC_STAT_TACHYTHERAPY_SHOCKS_DELIVERED_TOTAL: 0
MDC_IDC_STAT_TACHYTHERAPY_TOTAL_DTM_END: NORMAL